# Patient Record
Sex: FEMALE | Race: WHITE | Employment: PART TIME | URBAN - METROPOLITAN AREA
[De-identification: names, ages, dates, MRNs, and addresses within clinical notes are randomized per-mention and may not be internally consistent; named-entity substitution may affect disease eponyms.]

---

## 2017-04-20 ENCOUNTER — APPOINTMENT (OUTPATIENT)
Dept: GENERAL RADIOLOGY | Age: 57
End: 2017-04-20
Payer: MEDICARE

## 2017-04-20 ENCOUNTER — HOSPITAL ENCOUNTER (EMERGENCY)
Age: 57
Discharge: HOME | End: 2017-04-20
Attending: EMERGENCY MEDICINE
Payer: MEDICARE

## 2017-04-20 VITALS
TEMPERATURE: 98.2 F | SYSTOLIC BLOOD PRESSURE: 168 MMHG | OXYGEN SATURATION: 100 % | RESPIRATION RATE: 18 BRPM | DIASTOLIC BLOOD PRESSURE: 72 MMHG | HEART RATE: 71 BPM

## 2017-04-20 DIAGNOSIS — J06.9 VIRAL UPPER RESPIRATORY TRACT INFECTION: Primary | ICD-10-CM

## 2017-04-20 LAB
DIRECT EXAM: NORMAL
Lab: NORMAL
SPECIMEN DESCRIPTION: NORMAL
STATUS: NORMAL

## 2017-04-20 PROCEDURE — 99284 EMERGENCY DEPT VISIT MOD MDM: CPT

## 2017-04-20 PROCEDURE — 93005 ELECTROCARDIOGRAM TRACING: CPT

## 2017-04-20 PROCEDURE — 87651 STREP A DNA AMP PROBE: CPT

## 2017-04-20 PROCEDURE — 71020 XR CHEST STANDARD TWO VW: CPT

## 2017-04-20 RX ORDER — DABIGATRAN ETEXILATE 150 MG/1
150 CAPSULE, COATED PELLETS ORAL 2 TIMES DAILY
COMMUNITY

## 2017-04-20 RX ORDER — PREDNISONE 50 MG/1
50 TABLET ORAL DAILY
Qty: 5 TABLET | Refills: 0 | Status: SHIPPED | OUTPATIENT
Start: 2017-04-20 | End: 2017-04-25

## 2017-04-20 ASSESSMENT — PAIN DESCRIPTION - PAIN TYPE: TYPE: ACUTE PAIN

## 2017-04-20 ASSESSMENT — PAIN SCALES - GENERAL: PAINLEVEL_OUTOF10: 4

## 2017-04-21 LAB
DIRECT EXAM: NORMAL
DIRECT EXAM: NORMAL
EKG ATRIAL RATE: 60 BPM
EKG P AXIS: 20 DEGREES
EKG P-R INTERVAL: 168 MS
EKG Q-T INTERVAL: 434 MS
EKG QRS DURATION: 84 MS
EKG QTC CALCULATION (BAZETT): 434 MS
EKG R AXIS: 38 DEGREES
EKG T AXIS: 46 DEGREES
EKG VENTRICULAR RATE: 60 BPM
Lab: NORMAL
Lab: NORMAL
SPECIMEN DESCRIPTION: NORMAL
SPECIMEN DESCRIPTION: NORMAL
STATUS: NORMAL

## 2017-06-11 ENCOUNTER — HOSPITAL ENCOUNTER (EMERGENCY)
Age: 57
Discharge: HOME | End: 2017-06-11
Attending: EMERGENCY MEDICINE
Payer: MEDICARE

## 2017-06-11 ENCOUNTER — APPOINTMENT (OUTPATIENT)
Dept: GENERAL RADIOLOGY | Age: 57
End: 2017-06-11
Payer: MEDICARE

## 2017-06-11 VITALS
OXYGEN SATURATION: 99 % | WEIGHT: 182 LBS | HEART RATE: 55 BPM | BODY MASS INDEX: 27.58 KG/M2 | RESPIRATION RATE: 16 BRPM | TEMPERATURE: 98.8 F | HEIGHT: 68 IN | SYSTOLIC BLOOD PRESSURE: 131 MMHG | DIASTOLIC BLOOD PRESSURE: 78 MMHG

## 2017-06-11 DIAGNOSIS — I48.0 PAROXYSMAL ATRIAL FIBRILLATION (HCC): Primary | ICD-10-CM

## 2017-06-11 LAB
ABSOLUTE EOS #: 0.1 K/UL (ref 0–0.4)
ABSOLUTE LYMPH #: 3.2 K/UL (ref 1–4.8)
ABSOLUTE MONO #: 0.9 K/UL (ref 0.1–1.2)
ANION GAP SERPL CALCULATED.3IONS-SCNC: 15 MMOL/L (ref 9–17)
BASOPHILS # BLD: 1 %
BASOPHILS ABSOLUTE: 0.1 K/UL (ref 0–0.2)
BNP INTERPRETATION: NORMAL
BUN BLDV-MCNC: 20 MG/DL (ref 6–20)
BUN/CREAT BLD: ABNORMAL (ref 9–20)
CALCIUM SERPL-MCNC: 9.1 MG/DL (ref 8.6–10.4)
CHLORIDE BLD-SCNC: 105 MMOL/L (ref 98–107)
CO2: 25 MMOL/L (ref 20–31)
CREAT SERPL-MCNC: 0.68 MG/DL (ref 0.5–0.9)
DIFFERENTIAL TYPE: NORMAL
EOSINOPHILS RELATIVE PERCENT: 1 %
GFR AFRICAN AMERICAN: >60 ML/MIN
GFR NON-AFRICAN AMERICAN: >60 ML/MIN
GFR SERPL CREATININE-BSD FRML MDRD: ABNORMAL ML/MIN/{1.73_M2}
GFR SERPL CREATININE-BSD FRML MDRD: ABNORMAL ML/MIN/{1.73_M2}
GLUCOSE BLD-MCNC: 94 MG/DL (ref 70–99)
HCT VFR BLD CALC: 45.6 % (ref 36–46)
HEMOGLOBIN: 15.1 G/DL (ref 12–16)
INR BLD: 1.1
LYMPHOCYTES # BLD: 30 %
MCH RBC QN AUTO: 29.6 PG (ref 26–34)
MCHC RBC AUTO-ENTMCNC: 33.1 G/DL (ref 31–37)
MCV RBC AUTO: 89.5 FL (ref 80–100)
MONOCYTES # BLD: 8 %
PARTIAL THROMBOPLASTIN TIME: 46.7 SEC (ref 21.3–31.3)
PDW BLD-RTO: 13.7 % (ref 12.5–15.4)
PLATELET # BLD: 276 K/UL (ref 140–450)
PLATELET ESTIMATE: NORMAL
PMV BLD AUTO: 8.8 FL (ref 6–12)
POTASSIUM SERPL-SCNC: 3.2 MMOL/L (ref 3.7–5.3)
PRO-BNP: 136 PG/ML
PROTHROMBIN TIME: 11.3 SEC (ref 9.4–12.6)
RBC # BLD: 5.1 M/UL (ref 4–5.2)
RBC # BLD: NORMAL 10*6/UL
SEG NEUTROPHILS: 60 %
SEGMENTED NEUTROPHILS ABSOLUTE COUNT: 6.3 K/UL (ref 1.8–7.7)
SODIUM BLD-SCNC: 145 MMOL/L (ref 135–144)
TROPONIN INTERP: NORMAL
TROPONIN T: <0.03 NG/ML
WBC # BLD: 10.6 K/UL (ref 3.5–11)
WBC # BLD: NORMAL 10*3/UL

## 2017-06-11 PROCEDURE — 99285 EMERGENCY DEPT VISIT HI MDM: CPT

## 2017-06-11 PROCEDURE — 85025 COMPLETE CBC W/AUTO DIFF WBC: CPT

## 2017-06-11 PROCEDURE — 84484 ASSAY OF TROPONIN QUANT: CPT

## 2017-06-11 PROCEDURE — 96374 THER/PROPH/DIAG INJ IV PUSH: CPT

## 2017-06-11 PROCEDURE — 85730 THROMBOPLASTIN TIME PARTIAL: CPT

## 2017-06-11 PROCEDURE — 83880 ASSAY OF NATRIURETIC PEPTIDE: CPT

## 2017-06-11 PROCEDURE — 80048 BASIC METABOLIC PNL TOTAL CA: CPT

## 2017-06-11 PROCEDURE — 2500000003 HC RX 250 WO HCPCS: Performed by: EMERGENCY MEDICINE

## 2017-06-11 PROCEDURE — 93005 ELECTROCARDIOGRAM TRACING: CPT

## 2017-06-11 PROCEDURE — 36415 COLL VENOUS BLD VENIPUNCTURE: CPT

## 2017-06-11 PROCEDURE — 2580000003 HC RX 258: Performed by: EMERGENCY MEDICINE

## 2017-06-11 PROCEDURE — 71010 XR CHEST PORTABLE: CPT

## 2017-06-11 PROCEDURE — 85610 PROTHROMBIN TIME: CPT

## 2017-06-11 RX ORDER — 0.9 % SODIUM CHLORIDE 0.9 %
500 INTRAVENOUS SOLUTION INTRAVENOUS ONCE
Status: COMPLETED | OUTPATIENT
Start: 2017-06-11 | End: 2017-06-11

## 2017-06-11 RX ORDER — METOPROLOL TARTRATE 5 MG/5ML
5 INJECTION INTRAVENOUS ONCE
Status: COMPLETED | OUTPATIENT
Start: 2017-06-11 | End: 2017-06-11

## 2017-06-11 RX ORDER — LEVOTHYROXINE SODIUM 0.07 MG/1
75 TABLET ORAL DAILY
COMMUNITY

## 2017-06-11 RX ADMIN — METOPROLOL TARTRATE 5 MG: 5 INJECTION INTRAVENOUS at 17:16

## 2017-06-11 RX ADMIN — SODIUM CHLORIDE 500 ML: 9 INJECTION, SOLUTION INTRAVENOUS at 17:16

## 2017-06-12 LAB
EKG ATRIAL RATE: 375 BPM
EKG ATRIAL RATE: 55 BPM
EKG P AXIS: 61 DEGREES
EKG P-R INTERVAL: 188 MS
EKG Q-T INTERVAL: 366 MS
EKG Q-T INTERVAL: 436 MS
EKG QRS DURATION: 92 MS
EKG QRS DURATION: 96 MS
EKG QTC CALCULATION (BAZETT): 417 MS
EKG QTC CALCULATION (BAZETT): 445 MS
EKG R AXIS: 29 DEGREES
EKG R AXIS: 40 DEGREES
EKG T AXIS: -36 DEGREES
EKG T AXIS: 42 DEGREES
EKG VENTRICULAR RATE: 55 BPM
EKG VENTRICULAR RATE: 89 BPM

## 2017-11-30 ENCOUNTER — HOSPITAL ENCOUNTER (EMERGENCY)
Age: 57
Discharge: HOME | End: 2017-11-30
Attending: EMERGENCY MEDICINE
Payer: MEDICARE

## 2017-11-30 VITALS
SYSTOLIC BLOOD PRESSURE: 148 MMHG | TEMPERATURE: 97.9 F | WEIGHT: 184 LBS | OXYGEN SATURATION: 98 % | BODY MASS INDEX: 27.89 KG/M2 | HEIGHT: 68 IN | DIASTOLIC BLOOD PRESSURE: 87 MMHG | RESPIRATION RATE: 12 BRPM | HEART RATE: 76 BPM

## 2017-11-30 DIAGNOSIS — M62.838 SPASM OF MUSCLE: ICD-10-CM

## 2017-11-30 DIAGNOSIS — T14.8XXA MUSCLE STRAIN: Primary | ICD-10-CM

## 2017-11-30 PROCEDURE — 99282 EMERGENCY DEPT VISIT SF MDM: CPT

## 2017-11-30 RX ORDER — CYCLOBENZAPRINE HCL 10 MG
10 TABLET ORAL 3 TIMES DAILY PRN
Qty: 30 TABLET | Refills: 0 | Status: SHIPPED | OUTPATIENT
Start: 2017-11-30 | End: 2017-12-10

## 2017-11-30 ASSESSMENT — ENCOUNTER SYMPTOMS
COUGH: 0
VOMITING: 0
EYE REDNESS: 0
EYE DISCHARGE: 0
ABDOMINAL PAIN: 0
BACK PAIN: 1
NAUSEA: 0
SHORTNESS OF BREATH: 0
SORE THROAT: 0

## 2017-11-30 ASSESSMENT — PAIN SCALES - GENERAL: PAINLEVEL_OUTOF10: 10

## 2017-11-30 ASSESSMENT — PAIN DESCRIPTION - ORIENTATION: ORIENTATION: LOWER;MID

## 2017-11-30 ASSESSMENT — PAIN DESCRIPTION - LOCATION: LOCATION: BACK

## 2017-11-30 ASSESSMENT — PAIN DESCRIPTION - PAIN TYPE: TYPE: ACUTE PAIN

## 2017-11-30 NOTE — ED PROVIDER NOTES
[facility] ED  [address] [address]  Phone: [phone]  Fax: [phone]      Pt Name: Jefe Farr  MRN: [id number]  [place] 1960  Date of evaluation: 11/30/2017      CHIEF COMPLAINT       Chief Complaint   Patient presents with    Back Pain       HISTORY OF PRESENT ILLNESS   (Location, Quality, Severity, Duration, Timing, Context, Modifying Factors, Associated Signs and Symptoms)     Jefe Farr is a 61 y.o. female who presents to the ER for evaluation of mid and lower back pain. Patient states that 8 days ago it was a very with the day and the car door blew hitting her. She states that in the process she twisted her back. Since that time she has had continuous pain in the mid to lower back. She states that the pain is worsening. It feels as if the muscles are spasming. She has strained her back on one other occasion. She has had no changes in bowel or bladder habits. She has had no numbness or weakness in her extremities. There is no radiation of pain to the lower extremities or abdomen. Patient states that she took Tylenol for her discomfort last night with no relief. She rates her current discomfort at 8-10/10. Pain is worse with movement. Nursing Notes were reviewed. REVIEW OF SYSTEMS     (2-9 systems for level 4, 10 or more for level 5)    Review of Systems   Constitutional: Negative for chills and fever. HENT: Negative for congestion, ear pain and sore throat. Eyes: Negative for discharge and redness. Respiratory: Negative for cough and shortness of breath. Cardiovascular: Negative for chest pain. Gastrointestinal: Negative for abdominal pain, nausea and vomiting. Genitourinary: Negative for dysuria and frequency. Musculoskeletal: Positive for back pain. Negative for neck pain. Skin: Negative for rash. Neurological: Negative for seizures, syncope and headaches. Psychiatric/Behavioral: Negative for suicidal ideas. All other systems reviewed and are negative. PAST MEDICAL HISTORY    has a past medical history of Atrial fibrillation ([address] Headache; Hypertension; and Kidney stone. SURGICAL HISTORY      has a past surgical history that includes Tubal ligation (Bilateral). CURRENT MEDICATIONS       Previous Medications    ALBUTEROL SULFATE HFA (PROVENTIL HFA) 108 (90 BASE) MCG/ACT INHALER    Inhale 2 puffs into the lungs every 4 hours as needed for Wheezing or Shortness of Breath (Space out to every 6 hours as symptoms improve) Space out to every 6 hours as symptoms improve. DABIGATRAN (PRADAXA) 150 MG CAPSULE    Take 150 mg by mouth 2 times daily    HYDROCHLOROTHIAZIDE (HYDRODIURIL) 25 MG TABLET    Take 25 mg by mouth daily    LEVOTHYROXINE (SYNTHROID) 25 MCG TABLET    Take 25 mcg by mouth Daily    METOPROLOL SUCCINATE (TOPROL XL) 25 MG EXTENDED RELEASE TABLET    Take 100 mg by mouth daily     TOPIRAMATE (TOPAMAX PO)    Take 100 mg by mouth 2 times daily      ALLERGIES     is allergic to penicillins. FAMILY HISTORY     Not relevant to the current problem. SOCIAL HISTORY      reports that she has never smoked. She has never used smokeless tobacco. She reports that she drinks alcohol. She reports that she does not use drugs. PHYSICAL EXAM     (7 for level 4, 8 or more for level 5)    ED Triage Vitals [11/30/17 1338]   BP Temp Temp Source Pulse Resp SpO2 Height Weight   (!) 148/87 97.9 °F (36.6 °C) Oral 76 12 98 % 5' 8\" (1.727 m) 184 lb (83.5 kg)     Physical Exam   Constitutional: She appears well-developed and well-nourished. No distress. Nontoxic. HENT:   Head: Normocephalic and atraumatic. Eyes: No scleral icterus. Neck: Normal range of motion. Neck supple. No cervical midline tenderness. Cardiovascular: Normal rate, regular rhythm and normal heart sounds. Pulmonary/Chest: Effort normal and breath sounds normal. No respiratory distress. Abdominal: Soft.  Bowel sounds are normal. There is no tenderness. There is no rebound and no guarding. Musculoskeletal:        Thoracic back: She exhibits decreased range of motion, tenderness and spasm. She exhibits no deformity and normal pulse. Lumbar back: She exhibits decreased range of motion, tenderness and spasm. She exhibits no deformity and normal pulse. Back:    Bilateral thoracic and lumbar paravertebral tenderness with spasm. Normal ambulation. Normal strength and sensation in the bilateral lower extremities. Neurological: She is alert. Grossly intact. Skin: Skin is warm and dry. No rash noted. Psychiatric: She has a normal mood and affect. Her behavior is normal.   Vitals reviewed. DIAGNOSTIC RESULTS     LABS:  No results found for this visit on 11/30/17. MDM:   Patient presents to the ER for evaluation of thoracic and lumbar back pain. Patient states that she was hit by a car door 8 days ago and twisted her back. Patient states that her pain is progressively worsened. She denies numbness or weakness in her extremities. She has had no change in bowel or bladder habits. There is no radiation of pain to the abdomen or lower extremities. On exam, the patient has decreased range of motion most of her tenderness and spasm noted over the lower thoracic and lumbar back region. Patient has no midline tenderness. I feel that she has strained the muscles in her back. She will be treated with Flexeril. I will advise her to apply moist heat to the affected area. She may continue to take Tylenol as directed for pain. Controlled Substances Monitoring:   Attestation: The Prescription Monitoring Report for this patient was reviewed today. Ros Magana, Naresh Dennis)  Documentation: No signs of potential drug abuse or diversion identified.  Ros Magana PA-C)    EMERGENCY DEPARTMENT COURSE:   Vitals:    Vitals:    11/30/17 1338   BP: (!) 148/87   Pulse: 76   Resp: 12   Temp: 97.9 °F (36.6 °C)   TempSrc: Oral   SpO2: 98% Weight: 83.5 kg (184 lb)   Height: 5' 8\" (1.727 m)     -------------------------  BP: (!) 148/87, Temp: 97.9 °F (36.6 °C), Pulse: 76, Resp: 12    The patient was given the following medications:  Orders Placed This Encounter   Medications    cyclobenzaprine (FLEXERIL) 10 MG tablet     Sig: Take 1 tablet by mouth 3 times daily as needed for Muscle spasms     Dispense:  30 tablet     Refill:  0      FINAL IMPRESSION      1. Muscle strain    2.  Spasm of muscle        DISPOSITION/PLAN   DISPOSITION Decision to Discharge - home    Condition on Disposition  Stable    PATIENT REFERRED TO:  Nichelle Grissom DO  [address] [facility] [address] [phone]    Schedule an appointment as soon as possible for a visit in 3 days  For reevaluation    DISCHARGE MEDICATIONS:  New Prescriptions    CYCLOBENZAPRINE (FLEXERIL) 10 MG TABLET    Take 1 tablet by mouth 3 times daily as needed for Muscle spasms     (Please note that portions of this note were completed with a voice recognition program.  Efforts were made to edit the dictations but occasionally words are mis-transcribed.)    Malia Leo PA-C  11/30/17 [id number]

## 2018-01-14 ENCOUNTER — APPOINTMENT (OUTPATIENT)
Dept: GENERAL RADIOLOGY | Age: 58
End: 2018-01-14
Payer: MEDICARE

## 2018-01-14 ENCOUNTER — HOSPITAL ENCOUNTER (EMERGENCY)
Age: 58
Discharge: HOME | End: 2018-01-14
Attending: EMERGENCY MEDICINE
Payer: MEDICARE

## 2018-01-14 VITALS
HEIGHT: 68 IN | DIASTOLIC BLOOD PRESSURE: 77 MMHG | BODY MASS INDEX: 27.58 KG/M2 | HEART RATE: 55 BPM | RESPIRATION RATE: 16 BRPM | SYSTOLIC BLOOD PRESSURE: 121 MMHG | OXYGEN SATURATION: 98 % | TEMPERATURE: 98.1 F | WEIGHT: 182 LBS

## 2018-01-14 DIAGNOSIS — I48.0 PAROXYSMAL ATRIAL FIBRILLATION (HCC): Primary | ICD-10-CM

## 2018-01-14 LAB
ABSOLUTE EOS #: 0.1 K/UL (ref 0–0.4)
ABSOLUTE IMMATURE GRANULOCYTE: NORMAL K/UL (ref 0–0.3)
ABSOLUTE LYMPH #: 3.8 K/UL (ref 1–4.8)
ABSOLUTE MONO #: 0.8 K/UL (ref 0.1–1.2)
ANION GAP SERPL CALCULATED.3IONS-SCNC: 15 MMOL/L (ref 9–17)
BASOPHILS # BLD: 1 % (ref 0–2)
BASOPHILS ABSOLUTE: 0.1 K/UL (ref 0–0.2)
BNP INTERPRETATION: NORMAL
BUN BLDV-MCNC: 17 MG/DL (ref 6–20)
BUN/CREAT BLD: ABNORMAL (ref 9–20)
CALCIUM SERPL-MCNC: 9.6 MG/DL (ref 8.6–10.4)
CHLORIDE BLD-SCNC: 105 MMOL/L (ref 98–107)
CO2: 24 MMOL/L (ref 20–31)
CREAT SERPL-MCNC: 0.7 MG/DL (ref 0.5–0.9)
DIFFERENTIAL TYPE: NORMAL
EOSINOPHILS RELATIVE PERCENT: 1 % (ref 1–4)
GFR AFRICAN AMERICAN: >60 ML/MIN
GFR NON-AFRICAN AMERICAN: >60 ML/MIN
GFR SERPL CREATININE-BSD FRML MDRD: ABNORMAL ML/MIN/{1.73_M2}
GFR SERPL CREATININE-BSD FRML MDRD: ABNORMAL ML/MIN/{1.73_M2}
GLUCOSE BLD-MCNC: 93 MG/DL (ref 70–99)
HCT VFR BLD CALC: 44.9 % (ref 36–46)
HEMOGLOBIN: 15.2 G/DL (ref 12–16)
IMMATURE GRANULOCYTES: NORMAL %
LYMPHOCYTES # BLD: 35 % (ref 24–44)
MCH RBC QN AUTO: 30.5 PG (ref 26–34)
MCHC RBC AUTO-ENTMCNC: 33.9 G/DL (ref 31–37)
MCV RBC AUTO: 90 FL (ref 80–100)
MONOCYTES # BLD: 7 % (ref 2–11)
MYOGLOBIN: 58 NG/ML (ref 25–58)
PDW BLD-RTO: 14.2 % (ref 12.5–15.4)
PLATELET # BLD: 319 K/UL (ref 140–450)
PLATELET ESTIMATE: NORMAL
PMV BLD AUTO: 9.2 FL (ref 6–12)
POTASSIUM SERPL-SCNC: 3.4 MMOL/L (ref 3.7–5.3)
PRO-BNP: 159 PG/ML
RBC # BLD: 4.99 M/UL (ref 4–5.2)
RBC # BLD: NORMAL 10*6/UL
SEG NEUTROPHILS: 56 % (ref 36–66)
SEGMENTED NEUTROPHILS ABSOLUTE COUNT: 6.1 K/UL (ref 1.8–7.7)
SODIUM BLD-SCNC: 144 MMOL/L (ref 135–144)
TROPONIN INTERP: NORMAL
TROPONIN T: <0.03 NG/ML
WBC # BLD: 10.9 K/UL (ref 3.5–11)
WBC # BLD: NORMAL 10*3/UL

## 2018-01-14 PROCEDURE — 83874 ASSAY OF MYOGLOBIN: CPT

## 2018-01-14 PROCEDURE — 71046 X-RAY EXAM CHEST 2 VIEWS: CPT

## 2018-01-14 PROCEDURE — 36415 COLL VENOUS BLD VENIPUNCTURE: CPT

## 2018-01-14 PROCEDURE — 85025 COMPLETE CBC W/AUTO DIFF WBC: CPT

## 2018-01-14 PROCEDURE — 96374 THER/PROPH/DIAG INJ IV PUSH: CPT

## 2018-01-14 PROCEDURE — 99285 EMERGENCY DEPT VISIT HI MDM: CPT

## 2018-01-14 PROCEDURE — 2500000003 HC RX 250 WO HCPCS: Performed by: PHYSICIAN ASSISTANT

## 2018-01-14 PROCEDURE — 84484 ASSAY OF TROPONIN QUANT: CPT

## 2018-01-14 PROCEDURE — 80048 BASIC METABOLIC PNL TOTAL CA: CPT

## 2018-01-14 PROCEDURE — 83880 ASSAY OF NATRIURETIC PEPTIDE: CPT

## 2018-01-14 PROCEDURE — 93005 ELECTROCARDIOGRAM TRACING: CPT

## 2018-01-14 RX ORDER — METOPROLOL TARTRATE 5 MG/5ML
5 INJECTION INTRAVENOUS ONCE
Status: COMPLETED | OUTPATIENT
Start: 2018-01-14 | End: 2018-01-14

## 2018-01-14 RX ADMIN — METOPROLOL TARTRATE 5 MG: 5 INJECTION INTRAVENOUS at 18:00

## 2018-01-14 NOTE — ED PROVIDER NOTES
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 C/O lightheadedness. Denies nausea, sob, or pain. Pt assisted into gown and placed on full cardio-resp monitor. HISTORY OF PRESENT ILLNESS  (Location/Symptom, Timing/Onset, Context/Setting, Quality, Duration, Modifying Factors, Severity.)   Sergio Huntley is a 61 y.o. female who presents to the emergency department for evaluation of palpitations/lightheadedness and suspected A. Fib exacerbation or she is at work. 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 Patient states that this feels exactly like previous episodes of her paroxysmal A. Fib flareups. She denies any chest pains/respiratory symptoms/bowel symptoms/headache/altered mental status. She states she has some very mild intermittent lightheadedness only. She presently takes Predaxa and Metoprolol through Dr. Leydi Torrez her Cardiologist.  She reports intermittent mild edema of left ankle x1 month. She has no other complaints. Nursing Notes were reviewed. REVIEW OF SYSTEMS    (2-9 systems for level 4, 10 or more for level 5)     Review of Systems   Constitutional: Negative. HENT: Negative. Eyes: Negative. Respiratory: Negative. Cardiovascular: Negative. Gastrointestinal: Negative. Musculoskeletal: Negative. Endocrine: Negative. Genitourinary: Negative. Skin: Negative. Allergic/Immunologic: Negative. Neurological: Negative. Hematological: Negative. Psychiatric/Behavioral: Negative.      Except as noted above EOM are normal. Pupils are equal/round  Cardiovascular:  Irregularly irregular, normal heart sounds, S1 and S2.  Pulmonary/Chest: Effort normal and breath sounds normal. No wheezes/rales/rhonchi. Abdominal: Soft. Bowel sounds are normal.   No TTP. Musculoskeletal: Normal to inspection. subtle <1+ edema of lower leg/ankle, no erythema. No bilat calf TTP/edema. NV intact x 4. Neurological: Alert, age appropriate mentation and interaction. Skin: Skin is warm and dry. No rash noted. Psychiatric: Mood, memory, affect and judgment normal.       DIAGNOSTIC RESULTS     EKG: All EKG's are interpreted by the Emergency Department Physician who either signs or Co-signs this chart in the absence of a cardiologist.    Per attending note    RADIOLOGY:   Non-plain film images such as CT, Ultrasound and MRI are read by the radiologist. Plain radiographic images are visualized and preliminarily interpreted by the emergency physician with the below findings:      Interpretation per the Radiologist below, if available at the time of this note:    XR CHEST STANDARD (2 VW)   Final Result   No acute process. LABS:  Labs Reviewed   BASIC METABOLIC PANEL - Abnormal; Notable for the following:        Result Value    Potassium 3.4 (*)     All other components within normal limits   CBC WITH AUTO DIFFERENTIAL   TROP/MYOGLOBIN   BRAIN NATRIURETIC PEPTIDE   TROP/MYOGLOBIN         All other labs were within normal range or not returned as of this dictation. EMERGENCY DEPARTMENT COURSE and DIFFERENTIAL DIAGNOSIS/MDM:   Vitals:    Vitals:    01/14/18 1812 01/14/18 1820 01/14/18 1829 01/14/18 1830   BP: 121/66   121/77   Pulse: 88 57 55 55   Resp:       Temp:       TempSrc:       SpO2: 99% 96% 97% 98%   Weight:       Height:           5:25 PM  Vital signs stable for exception of some vacillating tachycardia. Patient is alert and oriented and in no pain or respiratory distress.   I'm completing a cardiac workup at this

## 2018-01-14 NOTE — ED PROVIDER NOTES
Department Physician who either signs or Co-signs this chart in the absence of a cardiologist.    EKG as interpreted by myself as showing atrial fibrillation with PVCs. There are nonspecific ST-T wave abnormalities. .  There is a rapid ventricular response with a rate of 112. QRS duration is 88. QT corrected interval is 496. RADIOLOGY:   Non-plain film images such as CT, Ultrasound and MRI are read by the radiologist. [facility] radiographic images are visualized and the radiologist interpretations are reviewed as follows:     Xr Chest Standard (2 Vw)    Result Date: 1/14/2018  EXAMINATION: TWO VIEWS OF THE CHEST 1/14/2018 5:04 pm COMPARISON: June 11, 2017. HISTORY: ORDERING SYSTEM PROVIDED HISTORY: afib/palps TECHNOLOGIST PROVIDED HISTORY: Reason for exam:->afib/palps Ordering Physician Provided Reason for Exam: heart palpitations Acuity: Acute Type of Exam: Initial Relevant Medical/Surgical History: hx Afib FINDINGS: The lungs are without acute focal process. There is no effusion or pneumothorax. The cardiomediastinal silhouette is stable. The osseous structures are stable. No acute process.        LABS:  Results for orders placed or performed during the hospital encounter of 01/14/18   CBC Auto Differential   Result Value Ref Range    WBC 10.9 3.5 - 11.0 k/uL    RBC 4.99 4.0 - 5.2 m/uL    Hemoglobin 15.2 12.0 - 16.0 g/dL    Hematocrit 44.9 36 - 46 %    MCV 90.0 80 - 100 fL    MCH 30.5 26 - 34 pg    MCHC 33.9 31 - 37 g/dL    RDW 14.2 12.5 - 15.4 %    Platelets 806 599 - 726 k/uL    MPV 9.2 6.0 - 12.0 fL    Differential Type NOT REPORTED     Seg Neutrophils 56 36 - 66 %    Lymphocytes 35 24 - 44 %    Monocytes 7 2 - 11 %    Eosinophils % 1 1 - 4 %    Basophils 1 0 - 2 %    Immature Granulocytes NOT REPORTED 0 %    Segs Absolute 6.10 1.8 - 7.7 k/uL    Absolute Lymph # 3.80 1.0 - 4.8 k/uL    Absolute Mono # 0.80 0.1 - 1.2 k/uL    Absolute Eos # 0.10 0.0 - 0.4 k/uL    Basophils # 0.10 0.0 - 0.2 k/uL    Absolute

## 2018-01-15 LAB
EKG ATRIAL RATE: 136 BPM
EKG ATRIAL RATE: 56 BPM
EKG P AXIS: 58 DEGREES
EKG P-R INTERVAL: 184 MS
EKG Q-T INTERVAL: 364 MS
EKG Q-T INTERVAL: 438 MS
EKG QRS DURATION: 84 MS
EKG QRS DURATION: 88 MS
EKG QTC CALCULATION (BAZETT): 422 MS
EKG QTC CALCULATION (BAZETT): 496 MS
EKG R AXIS: 39 DEGREES
EKG R AXIS: 42 DEGREES
EKG T AXIS: -28 DEGREES
EKG T AXIS: 38 DEGREES
EKG VENTRICULAR RATE: 112 BPM
EKG VENTRICULAR RATE: 56 BPM

## 2018-01-15 PROCEDURE — 93005 ELECTROCARDIOGRAM TRACING: CPT

## 2018-05-25 ENCOUNTER — HOSPITAL ENCOUNTER (EMERGENCY)
Age: 58
Discharge: HOME | End: 2018-05-25
Attending: EMERGENCY MEDICINE
Payer: MEDICARE

## 2018-05-25 VITALS
TEMPERATURE: 98.1 F | SYSTOLIC BLOOD PRESSURE: 155 MMHG | WEIGHT: 185 LBS | BODY MASS INDEX: 28.04 KG/M2 | RESPIRATION RATE: 18 BRPM | HEART RATE: 68 BPM | DIASTOLIC BLOOD PRESSURE: 86 MMHG | HEIGHT: 68 IN | OXYGEN SATURATION: 97 %

## 2018-05-25 DIAGNOSIS — J02.0 ACUTE STREPTOCOCCAL PHARYNGITIS: Primary | ICD-10-CM

## 2018-05-25 PROCEDURE — 6370000000 HC RX 637 (ALT 250 FOR IP): Performed by: EMERGENCY MEDICINE

## 2018-05-25 PROCEDURE — 99282 EMERGENCY DEPT VISIT SF MDM: CPT

## 2018-05-25 RX ORDER — AZITHROMYCIN 250 MG/1
500 TABLET, FILM COATED ORAL ONCE
Status: COMPLETED | OUTPATIENT
Start: 2018-05-25 | End: 2018-05-25

## 2018-05-25 RX ORDER — AZITHROMYCIN 250 MG/1
250 TABLET, FILM COATED ORAL DAILY
Qty: 9 TABLET | Refills: 0 | Status: SHIPPED | OUTPATIENT
Start: 2018-05-25 | End: 2018-11-20

## 2018-05-25 RX ADMIN — AZITHROMYCIN 500 MG: 250 TABLET, FILM COATED ORAL at 10:03

## 2018-05-25 ASSESSMENT — PAIN DESCRIPTION - PAIN TYPE: TYPE: ACUTE PAIN

## 2018-05-25 ASSESSMENT — PAIN DESCRIPTION - ORIENTATION: ORIENTATION: LEFT

## 2018-05-25 ASSESSMENT — PAIN SCALES - GENERAL: PAINLEVEL_OUTOF10: 5

## 2018-05-25 ASSESSMENT — PAIN DESCRIPTION - LOCATION: LOCATION: EAR;THROAT

## 2018-05-29 ASSESSMENT — ENCOUNTER SYMPTOMS
STRIDOR: 0
EYE PAIN: 0
EYE REDNESS: 0
NAUSEA: 0
TROUBLE SWALLOWING: 0
ABDOMINAL PAIN: 0
COUGH: 0
EYE DISCHARGE: 0
SHORTNESS OF BREATH: 0
WHEEZING: 0
COLOR CHANGE: 0
VOMITING: 0
SORE THROAT: 1
DIARRHEA: 0
CONSTIPATION: 0

## 2018-06-02 ENCOUNTER — APPOINTMENT (OUTPATIENT)
Dept: GENERAL RADIOLOGY | Age: 58
End: 2018-06-02
Payer: MEDICARE

## 2018-06-02 ENCOUNTER — HOSPITAL ENCOUNTER (EMERGENCY)
Age: 58
Discharge: HOME | End: 2018-06-02
Attending: EMERGENCY MEDICINE
Payer: MEDICARE

## 2018-06-02 VITALS
DIASTOLIC BLOOD PRESSURE: 102 MMHG | OXYGEN SATURATION: 98 % | SYSTOLIC BLOOD PRESSURE: 155 MMHG | BODY MASS INDEX: 27.43 KG/M2 | RESPIRATION RATE: 10 BRPM | HEART RATE: 61 BPM | HEIGHT: 68 IN | WEIGHT: 181 LBS | TEMPERATURE: 97.9 F

## 2018-06-02 DIAGNOSIS — I48.0 PAROXYSMAL ATRIAL FIBRILLATION (HCC): Primary | ICD-10-CM

## 2018-06-02 LAB
ABSOLUTE EOS #: 0.1 K/UL (ref 0–0.4)
ABSOLUTE IMMATURE GRANULOCYTE: ABNORMAL K/UL (ref 0–0.3)
ABSOLUTE LYMPH #: 4.5 K/UL (ref 1–4.8)
ABSOLUTE MONO #: 0.7 K/UL (ref 0.1–1.2)
ANION GAP SERPL CALCULATED.3IONS-SCNC: 16 MMOL/L (ref 9–17)
BASOPHILS # BLD: 2 % (ref 0–2)
BASOPHILS ABSOLUTE: 0.2 K/UL (ref 0–0.2)
BUN BLDV-MCNC: 17 MG/DL (ref 6–20)
BUN/CREAT BLD: ABNORMAL (ref 9–20)
CALCIUM SERPL-MCNC: 9.2 MG/DL (ref 8.6–10.4)
CHLORIDE BLD-SCNC: 105 MMOL/L (ref 98–107)
CO2: 26 MMOL/L (ref 20–31)
CREAT SERPL-MCNC: 0.69 MG/DL (ref 0.5–0.9)
DIFFERENTIAL TYPE: ABNORMAL
EKG ATRIAL RATE: 61 BPM
EKG P AXIS: 41 DEGREES
EKG P-R INTERVAL: 184 MS
EKG Q-T INTERVAL: 432 MS
EKG QRS DURATION: 94 MS
EKG QTC CALCULATION (BAZETT): 434 MS
EKG R AXIS: 34 DEGREES
EKG T AXIS: 40 DEGREES
EKG VENTRICULAR RATE: 61 BPM
EOSINOPHILS RELATIVE PERCENT: 1 % (ref 1–4)
GFR AFRICAN AMERICAN: >60 ML/MIN
GFR NON-AFRICAN AMERICAN: >60 ML/MIN
GFR SERPL CREATININE-BSD FRML MDRD: ABNORMAL ML/MIN/{1.73_M2}
GFR SERPL CREATININE-BSD FRML MDRD: ABNORMAL ML/MIN/{1.73_M2}
GLUCOSE BLD-MCNC: 95 MG/DL (ref 70–99)
HCT VFR BLD CALC: 46.6 % (ref 36–46)
HEMOGLOBIN: 15.3 G/DL (ref 12–16)
IMMATURE GRANULOCYTES: ABNORMAL %
LYMPHOCYTES # BLD: 41 % (ref 24–44)
MCH RBC QN AUTO: 30.3 PG (ref 26–34)
MCHC RBC AUTO-ENTMCNC: 32.8 G/DL (ref 31–37)
MCV RBC AUTO: 92.4 FL (ref 80–100)
MONOCYTES # BLD: 7 % (ref 2–11)
NRBC AUTOMATED: ABNORMAL PER 100 WBC
PDW BLD-RTO: 14.1 % (ref 12.5–15.4)
PLATELET # BLD: 295 K/UL (ref 140–450)
PLATELET ESTIMATE: ABNORMAL
PMV BLD AUTO: 9.5 FL (ref 6–12)
POTASSIUM SERPL-SCNC: 3.1 MMOL/L (ref 3.7–5.3)
RBC # BLD: 5.05 M/UL (ref 4–5.2)
RBC # BLD: ABNORMAL 10*6/UL
SEG NEUTROPHILS: 49 % (ref 36–66)
SEGMENTED NEUTROPHILS ABSOLUTE COUNT: 5.5 K/UL (ref 1.8–7.7)
SODIUM BLD-SCNC: 147 MMOL/L (ref 135–144)
TROPONIN INTERP: NORMAL
TROPONIN T: <0.03 NG/ML
WBC # BLD: 11 K/UL (ref 3.5–11)
WBC # BLD: ABNORMAL 10*3/UL

## 2018-06-02 PROCEDURE — 36415 COLL VENOUS BLD VENIPUNCTURE: CPT

## 2018-06-02 PROCEDURE — 80048 BASIC METABOLIC PNL TOTAL CA: CPT

## 2018-06-02 PROCEDURE — 85025 COMPLETE CBC W/AUTO DIFF WBC: CPT

## 2018-06-02 PROCEDURE — 93005 ELECTROCARDIOGRAM TRACING: CPT

## 2018-06-02 PROCEDURE — 71046 X-RAY EXAM CHEST 2 VIEWS: CPT

## 2018-06-02 PROCEDURE — 99285 EMERGENCY DEPT VISIT HI MDM: CPT

## 2018-06-02 PROCEDURE — 84484 ASSAY OF TROPONIN QUANT: CPT

## 2018-06-02 ASSESSMENT — ENCOUNTER SYMPTOMS
COUGH: 0
BACK PAIN: 0
SORE THROAT: 0
EYE DISCHARGE: 0
EYE REDNESS: 0
NAUSEA: 0
ABDOMINAL PAIN: 0
SHORTNESS OF BREATH: 1
VOMITING: 0

## 2018-06-02 ASSESSMENT — PAIN DESCRIPTION - PAIN TYPE: TYPE: ACUTE PAIN

## 2018-06-02 ASSESSMENT — PAIN DESCRIPTION - LOCATION: LOCATION: CHEST

## 2018-11-20 ENCOUNTER — HOSPITAL ENCOUNTER (EMERGENCY)
Age: 58
Discharge: HOME | End: 2018-11-20
Attending: EMERGENCY MEDICINE
Payer: MEDICARE

## 2018-11-20 ENCOUNTER — APPOINTMENT (OUTPATIENT)
Dept: GENERAL RADIOLOGY | Age: 58
End: 2018-11-20
Payer: MEDICARE

## 2018-11-20 VITALS
SYSTOLIC BLOOD PRESSURE: 131 MMHG | WEIGHT: 183.25 LBS | RESPIRATION RATE: 8 BRPM | DIASTOLIC BLOOD PRESSURE: 72 MMHG | HEIGHT: 68 IN | HEART RATE: 58 BPM | TEMPERATURE: 97.9 F | BODY MASS INDEX: 27.77 KG/M2 | OXYGEN SATURATION: 96 %

## 2018-11-20 DIAGNOSIS — I48.0 PAROXYSMAL ATRIAL FIBRILLATION (HCC): Primary | ICD-10-CM

## 2018-11-20 LAB
-: ABNORMAL
ABSOLUTE EOS #: 0.18 K/UL (ref 0–0.4)
ABSOLUTE IMMATURE GRANULOCYTE: ABNORMAL K/UL (ref 0–0.3)
ABSOLUTE LYMPH #: 4.81 K/UL (ref 1–4.8)
ABSOLUTE MONO #: 0.87 K/UL (ref 0.1–1.2)
ALBUMIN SERPL-MCNC: 4.2 G/DL (ref 3.5–5.2)
ALBUMIN/GLOBULIN RATIO: 1.2 (ref 1–2.5)
ALP BLD-CCNC: 84 U/L (ref 35–104)
ALT SERPL-CCNC: 20 U/L (ref 5–33)
AMORPHOUS: ABNORMAL
ANION GAP SERPL CALCULATED.3IONS-SCNC: 16 MMOL/L (ref 9–17)
AST SERPL-CCNC: 22 U/L
BACTERIA: ABNORMAL
BASOPHILS # BLD: 1 % (ref 0–2)
BASOPHILS ABSOLUTE: 0.06 K/UL (ref 0–0.2)
BILIRUB SERPL-MCNC: 0.23 MG/DL (ref 0.3–1.2)
BILIRUBIN URINE: NEGATIVE
BUN BLDV-MCNC: 14 MG/DL (ref 6–20)
BUN/CREAT BLD: ABNORMAL (ref 9–20)
CALCIUM SERPL-MCNC: 9.7 MG/DL (ref 8.6–10.4)
CASTS UA: ABNORMAL /LPF
CHLORIDE BLD-SCNC: 106 MMOL/L (ref 98–107)
CO2: 23 MMOL/L (ref 20–31)
COLOR: YELLOW
COMMENT UA: ABNORMAL
CREAT SERPL-MCNC: 0.7 MG/DL (ref 0.5–0.9)
CRYSTALS, UA: ABNORMAL /HPF
DIFFERENTIAL TYPE: ABNORMAL
EKG ATRIAL RATE: 59 BPM
EKG P AXIS: 47 DEGREES
EKG P-R INTERVAL: 178 MS
EKG Q-T INTERVAL: 462 MS
EKG QRS DURATION: 88 MS
EKG QTC CALCULATION (BAZETT): 457 MS
EKG R AXIS: 31 DEGREES
EKG T AXIS: 43 DEGREES
EKG VENTRICULAR RATE: 59 BPM
EOSINOPHILS RELATIVE PERCENT: 2 % (ref 1–4)
EPITHELIAL CELLS UA: ABNORMAL /HPF (ref 0–5)
GFR AFRICAN AMERICAN: >60 ML/MIN
GFR NON-AFRICAN AMERICAN: >60 ML/MIN
GFR SERPL CREATININE-BSD FRML MDRD: ABNORMAL ML/MIN/{1.73_M2}
GFR SERPL CREATININE-BSD FRML MDRD: ABNORMAL ML/MIN/{1.73_M2}
GLUCOSE BLD-MCNC: 99 MG/DL (ref 70–99)
GLUCOSE URINE: NEGATIVE
HCT VFR BLD CALC: 48.6 % (ref 36–46)
HEMOGLOBIN: 16.3 G/DL (ref 12–16)
IMMATURE GRANULOCYTES: ABNORMAL %
INR BLD: 1.1
KETONES, URINE: NEGATIVE
LEUKOCYTE ESTERASE, URINE: ABNORMAL
LYMPHOCYTES # BLD: 42 % (ref 24–44)
MAGNESIUM: 1.9 MG/DL (ref 1.6–2.6)
MCH RBC QN AUTO: 30.5 PG (ref 26–34)
MCHC RBC AUTO-ENTMCNC: 33.5 G/DL (ref 31–37)
MCV RBC AUTO: 91 FL (ref 80–100)
MONOCYTES # BLD: 8 % (ref 2–11)
MUCUS: ABNORMAL
NITRITE, URINE: NEGATIVE
NRBC AUTOMATED: ABNORMAL PER 100 WBC
OTHER OBSERVATIONS UA: ABNORMAL
PARTIAL THROMBOPLASTIN TIME: 48.4 SEC (ref 21.3–31.3)
PDW BLD-RTO: 14.1 % (ref 12.5–15.4)
PH UA: 6 (ref 5–8)
PLATELET # BLD: 298 K/UL (ref 140–450)
PLATELET ESTIMATE: ABNORMAL
PMV BLD AUTO: 10.8 FL (ref 8–14)
POTASSIUM SERPL-SCNC: 3.3 MMOL/L (ref 3.7–5.3)
PROTEIN UA: NEGATIVE
PROTHROMBIN TIME: 11.2 SEC (ref 9.4–12.6)
RBC # BLD: 5.34 M/UL (ref 4–5.2)
RBC # BLD: ABNORMAL 10*6/UL
RBC UA: ABNORMAL /HPF (ref 0–2)
RENAL EPITHELIAL, UA: ABNORMAL /HPF
SEG NEUTROPHILS: 47 % (ref 36–66)
SEGMENTED NEUTROPHILS ABSOLUTE COUNT: 5.64 K/UL (ref 1.8–7.7)
SODIUM BLD-SCNC: 145 MMOL/L (ref 135–144)
SPECIFIC GRAVITY UA: 1.02 (ref 1–1.03)
T3 TOTAL: 108 NG/DL (ref 80–200)
THYROXINE, FREE: 1.1 NG/DL (ref 0.93–1.7)
TOTAL PROTEIN: 7.7 G/DL (ref 6.4–8.3)
TRICHOMONAS: ABNORMAL
TROPONIN INTERP: NORMAL
TROPONIN INTERP: NORMAL
TROPONIN T: <0.03 NG/ML
TROPONIN T: <0.03 NG/ML
TSH SERPL DL<=0.05 MIU/L-ACNC: 2.99 MIU/L (ref 0.3–5)
TURBIDITY: CLEAR
URINE HGB: ABNORMAL
UROBILINOGEN, URINE: NORMAL
WBC # BLD: 11.6 K/UL (ref 3.5–11)
WBC # BLD: ABNORMAL 10*3/UL
WBC UA: ABNORMAL /HPF (ref 0–5)
YEAST: ABNORMAL

## 2018-11-20 PROCEDURE — 84439 ASSAY OF FREE THYROXINE: CPT

## 2018-11-20 PROCEDURE — 85610 PROTHROMBIN TIME: CPT

## 2018-11-20 PROCEDURE — 93005 ELECTROCARDIOGRAM TRACING: CPT

## 2018-11-20 PROCEDURE — 99285 EMERGENCY DEPT VISIT HI MDM: CPT

## 2018-11-20 PROCEDURE — 84484 ASSAY OF TROPONIN QUANT: CPT

## 2018-11-20 PROCEDURE — 87086 URINE CULTURE/COLONY COUNT: CPT

## 2018-11-20 PROCEDURE — 36415 COLL VENOUS BLD VENIPUNCTURE: CPT

## 2018-11-20 PROCEDURE — 80053 COMPREHEN METABOLIC PANEL: CPT

## 2018-11-20 PROCEDURE — 71046 X-RAY EXAM CHEST 2 VIEWS: CPT

## 2018-11-20 PROCEDURE — 85730 THROMBOPLASTIN TIME PARTIAL: CPT

## 2018-11-20 PROCEDURE — 84480 ASSAY TRIIODOTHYRONINE (T3): CPT

## 2018-11-20 PROCEDURE — 85025 COMPLETE CBC W/AUTO DIFF WBC: CPT

## 2018-11-20 PROCEDURE — 84443 ASSAY THYROID STIM HORMONE: CPT

## 2018-11-20 PROCEDURE — 81001 URINALYSIS AUTO W/SCOPE: CPT

## 2018-11-20 PROCEDURE — 83735 ASSAY OF MAGNESIUM: CPT

## 2018-11-20 RX ORDER — FLECAINIDE ACETATE 100 MG/1
100 TABLET ORAL 2 TIMES DAILY
Status: ON HOLD | COMMUNITY
End: 2019-08-29 | Stop reason: HOSPADM

## 2018-11-20 ASSESSMENT — ENCOUNTER SYMPTOMS
ABDOMINAL PAIN: 0
SHORTNESS OF BREATH: 0
RHINORRHEA: 0
DIARRHEA: 0
EYE PAIN: 0
COUGH: 0
VOMITING: 0
BACK PAIN: 0
SORE THROAT: 0
NAUSEA: 0

## 2018-11-20 NOTE — ED PROVIDER NOTES
[facility]  [address] [address]  Phone: [phone]  Fax: [phone]    eMERGENCY dEPARTMENT eNCOUnter          Pt Name: Sherly Whittington  MRN: [id number]  [place] 1960  Date of evaluation: 11/20/2018      CHIEF COMPLAINT       Chief Complaint   Patient presents with    Tachycardia     patient was awakened by a fast heart rate @ 0058, with sob and dizziness. HISTORY OF PRESENT ILLNESS              Sherly Whittington is a 60 y.o. female who presents With palpitations. Does have a history of paroxysmal atrial fibrillation. States this feels similar to prior exacerbations. States that she has flecainide as when necessary however she saw her cardiologist today and was told to take it twice a day regularly. Has not had an ablation. Denies chest pain. Did feel some lightheadedness but currently does not feel it. Denies any chest tightness or heaviness or other chest discomfort. No difficulty breathing. Denies trauma. No fevers or other abnormal symptoms such as cough. Denies other symptoms or concerns. REVIEW OF SYSTEMS         Review of Systems   Constitutional: Negative for chills, fatigue and fever. HENT: Negative for rhinorrhea and sore throat. Eyes: Negative for pain. Respiratory: Negative for cough and shortness of breath. Cardiovascular: Positive for palpitations. Negative for chest pain. Gastrointestinal: Negative for abdominal pain, diarrhea, nausea and vomiting. Genitourinary: Negative for difficulty urinating and dysuria. Musculoskeletal: Negative for back pain and neck pain. Skin: Negative for rash. Neurological: Positive for dizziness. Negative for weakness and headaches. All other systems reviewed and are negative. PAST MEDICAL HISTORY    has a past medical history of Atrial fibrillation ([address] Headache; Hypertension; and Kidney stone.     SURGICAL HISTORY      has a past surgical history that includes Tubal Microscopic Urinalysis   Result Value Ref Range    -          WBC, UA 2 TO 5 0 - 5 /HPF    RBC, UA 5 TO 10 0 - 2 /HPF    Casts UA NOT REPORTED /LPF    Crystals UA NOT REPORTED NONE /HPF    Epithelial Cells UA 2 TO 5 0 - 5 /HPF    Renal Epithelial, Urine NOT REPORTED 0 /HPF    Bacteria, UA NOT REPORTED NONE    Mucus, UA 1+ (A) NONE    Trichomonas, UA NOT REPORTED NONE    Amorphous, UA NOT REPORTED NONE    Other Observations UA Culture ordered based on defined criteria. (A) NREQ    Yeast, UA NOT REPORTED NONE       EMERGENCY DEPARTMENT COURSE:     The patient was given the following medications:  No orders of the defined types were placed in this encounter. Vitals:    Vitals:    11/20/18 0330 11/20/18 0345 11/20/18 0400 11/20/18 0415   BP: (!) 148/72 (!) 146/93 135/73 131/72   Pulse: 57 60 59 58   Resp: 12 15 17 8   Temp:       SpO2: 96% 95% 95% 96%   Weight:       Height:         -------------------------  BP: 131/72, Temp: 97.9 °F (36.6 °C), Pulse: 58, Resp: 8      Re-evaluation Notes    4:56 AM:   Patient doing well on reevaluation. No acute changes. Plan will be to discharge home. Second troponin negative. I do not feel she requires admission for further monitoring. She is asymptomatic. Patient advised to follow-up with her cardiologist or return right away if worse or for any new or concerning symptoms. She is comfortable with this plan. I have reviewed the disposition diagnosis with the patient and or their family/guardian. I have answered their questions and given discharge instructions. They voiced understanding of these instructions and did not have any further questions or complaints. CONSULTS:    None    CRITICAL CARE:     None    PROCEDURES:    None    FINAL IMPRESSION      1.  Paroxysmal atrial fibrillation [facility])          DISPOSITION/PLAN   DISPOSITION Decision To Discharge 11/20/2018 04:44:09 AM      Condition on Disposition    Improved    PATIENT REFERRED TO:  Isha Aldana

## 2018-11-20 NOTE — LETTER
[facility] ED  [address] [address]  Phone: [phone]  Fax: [phone]               November 20, 2018    Patient: Trish Brock   YOB: 1960   Date of Visit: 11/20/2018       To Whom It May Concern:    Jenny Lock was seen and treated in our emergency department on 11/20/2018. She may return to work on 11/21/2018.       Sincerely,       Shilpa Morales RN         Signature:__________________________________

## 2018-11-21 LAB
CULTURE: NO GROWTH
Lab: NORMAL
SPECIMEN DESCRIPTION: NORMAL
STATUS: NORMAL

## 2019-02-09 ENCOUNTER — HOSPITAL ENCOUNTER (EMERGENCY)
Age: 59
Discharge: HOME | End: 2019-02-09
Attending: EMERGENCY MEDICINE
Payer: MEDICARE

## 2019-02-09 ENCOUNTER — APPOINTMENT (OUTPATIENT)
Dept: GENERAL RADIOLOGY | Age: 59
End: 2019-02-09
Payer: MEDICARE

## 2019-02-09 VITALS
BODY MASS INDEX: 27.13 KG/M2 | SYSTOLIC BLOOD PRESSURE: 133 MMHG | OXYGEN SATURATION: 96 % | RESPIRATION RATE: 17 BRPM | WEIGHT: 179 LBS | DIASTOLIC BLOOD PRESSURE: 87 MMHG | HEART RATE: 54 BPM | TEMPERATURE: 97.7 F | HEIGHT: 68 IN

## 2019-02-09 DIAGNOSIS — I48.0 PAROXYSMAL ATRIAL FIBRILLATION (HCC): Primary | ICD-10-CM

## 2019-02-09 LAB
ABSOLUTE EOS #: 0.15 K/UL (ref 0–0.4)
ABSOLUTE IMMATURE GRANULOCYTE: ABNORMAL K/UL (ref 0–0.3)
ABSOLUTE LYMPH #: 2.24 K/UL (ref 1–4.8)
ABSOLUTE MONO #: 0.43 K/UL (ref 0.1–1.2)
ANION GAP SERPL CALCULATED.3IONS-SCNC: 16 MMOL/L (ref 9–17)
BASOPHILS # BLD: 1 % (ref 0–2)
BASOPHILS ABSOLUTE: 0.08 K/UL (ref 0–0.2)
BNP INTERPRETATION: NORMAL
BUN BLDV-MCNC: 19 MG/DL (ref 6–20)
BUN/CREAT BLD: ABNORMAL (ref 9–20)
CALCIUM SERPL-MCNC: 9.2 MG/DL (ref 8.6–10.4)
CHLORIDE BLD-SCNC: 108 MMOL/L (ref 98–107)
CO2: 21 MMOL/L (ref 20–31)
CREAT SERPL-MCNC: 0.73 MG/DL (ref 0.5–0.9)
DIFFERENTIAL TYPE: ABNORMAL
EOSINOPHILS RELATIVE PERCENT: 2 % (ref 1–4)
GFR AFRICAN AMERICAN: >60 ML/MIN
GFR NON-AFRICAN AMERICAN: >60 ML/MIN
GFR SERPL CREATININE-BSD FRML MDRD: ABNORMAL ML/MIN/{1.73_M2}
GFR SERPL CREATININE-BSD FRML MDRD: ABNORMAL ML/MIN/{1.73_M2}
GLUCOSE BLD-MCNC: 129 MG/DL (ref 70–99)
HCT VFR BLD CALC: 49.4 % (ref 36–46)
HEMOGLOBIN: 16.3 G/DL (ref 12–16)
IMMATURE GRANULOCYTES: ABNORMAL %
INR BLD: 1.3
LYMPHOCYTES # BLD: 32 % (ref 24–44)
MCH RBC QN AUTO: 29.9 PG (ref 26–34)
MCHC RBC AUTO-ENTMCNC: 33 G/DL (ref 31–37)
MCV RBC AUTO: 90.6 FL (ref 80–100)
MONOCYTES # BLD: 6 % (ref 2–11)
NRBC AUTOMATED: ABNORMAL PER 100 WBC
PARTIAL THROMBOPLASTIN TIME: 56 SEC (ref 21.3–31.3)
PDW BLD-RTO: 14.3 % (ref 12.5–15.4)
PLATELET # BLD: 290 K/UL (ref 140–450)
PLATELET ESTIMATE: ABNORMAL
PMV BLD AUTO: 10.5 FL (ref 8–14)
POTASSIUM SERPL-SCNC: 3.6 MMOL/L (ref 3.7–5.3)
PRO-BNP: 87 PG/ML
PROTHROMBIN TIME: 13 SEC (ref 9.4–12.6)
RBC # BLD: 5.45 M/UL (ref 4–5.2)
RBC # BLD: ABNORMAL 10*6/UL
SEG NEUTROPHILS: 59 % (ref 36–66)
SEGMENTED NEUTROPHILS ABSOLUTE COUNT: 4.19 K/UL (ref 1.8–7.7)
SODIUM BLD-SCNC: 145 MMOL/L (ref 135–144)
TROPONIN INTERP: NORMAL
TROPONIN T: NORMAL NG/ML
TROPONIN, HIGH SENSITIVITY: <6 NG/L (ref 0–14)
WBC # BLD: 7.1 K/UL (ref 3.5–11)
WBC # BLD: ABNORMAL 10*3/UL

## 2019-02-09 PROCEDURE — 83880 ASSAY OF NATRIURETIC PEPTIDE: CPT

## 2019-02-09 PROCEDURE — 71045 X-RAY EXAM CHEST 1 VIEW: CPT

## 2019-02-09 PROCEDURE — 36415 COLL VENOUS BLD VENIPUNCTURE: CPT

## 2019-02-09 PROCEDURE — 85610 PROTHROMBIN TIME: CPT

## 2019-02-09 PROCEDURE — 80048 BASIC METABOLIC PNL TOTAL CA: CPT

## 2019-02-09 PROCEDURE — 99285 EMERGENCY DEPT VISIT HI MDM: CPT

## 2019-02-09 PROCEDURE — 85025 COMPLETE CBC W/AUTO DIFF WBC: CPT

## 2019-02-09 PROCEDURE — 93005 ELECTROCARDIOGRAM TRACING: CPT

## 2019-02-09 PROCEDURE — 84484 ASSAY OF TROPONIN QUANT: CPT

## 2019-02-09 PROCEDURE — 85730 THROMBOPLASTIN TIME PARTIAL: CPT

## 2019-02-09 RX ORDER — DULOXETIN HYDROCHLORIDE 30 MG/1
30 CAPSULE, DELAYED RELEASE ORAL DAILY
COMMUNITY
End: 2021-02-04

## 2019-02-12 LAB
EKG ATRIAL RATE: 50 BPM
EKG P AXIS: 64 DEGREES
EKG P-R INTERVAL: 186 MS
EKG Q-T INTERVAL: 482 MS
EKG QRS DURATION: 94 MS
EKG QTC CALCULATION (BAZETT): 439 MS
EKG R AXIS: 46 DEGREES
EKG T AXIS: 56 DEGREES
EKG VENTRICULAR RATE: 50 BPM

## 2019-03-09 ENCOUNTER — APPOINTMENT (OUTPATIENT)
Dept: GENERAL RADIOLOGY | Age: 59
End: 2019-03-09
Payer: MEDICARE

## 2019-03-09 ENCOUNTER — HOSPITAL ENCOUNTER (EMERGENCY)
Age: 59
Discharge: HOME | End: 2019-03-10
Attending: EMERGENCY MEDICINE
Payer: MEDICARE

## 2019-03-09 DIAGNOSIS — R00.2 PALPITATIONS: Primary | ICD-10-CM

## 2019-03-09 LAB
ABSOLUTE EOS #: 0.1 K/UL (ref 0–0.4)
ABSOLUTE IMMATURE GRANULOCYTE: ABNORMAL K/UL (ref 0–0.3)
ABSOLUTE LYMPH #: 2.9 K/UL (ref 1–4.8)
ABSOLUTE MONO #: 0.7 K/UL (ref 0.1–1.2)
ANION GAP SERPL CALCULATED.3IONS-SCNC: 15 MMOL/L (ref 9–17)
BASOPHILS # BLD: 1 % (ref 0–2)
BASOPHILS ABSOLUTE: 0.2 K/UL (ref 0–0.2)
BUN BLDV-MCNC: 22 MG/DL (ref 6–20)
BUN/CREAT BLD: ABNORMAL (ref 9–20)
CALCIUM SERPL-MCNC: 9.7 MG/DL (ref 8.6–10.4)
CHLORIDE BLD-SCNC: 106 MMOL/L (ref 98–107)
CO2: 23 MMOL/L (ref 20–31)
CREAT SERPL-MCNC: 0.87 MG/DL (ref 0.5–0.9)
DIFFERENTIAL TYPE: ABNORMAL
EOSINOPHILS RELATIVE PERCENT: 1 % (ref 1–4)
GFR AFRICAN AMERICAN: >60 ML/MIN
GFR NON-AFRICAN AMERICAN: >60 ML/MIN
GFR SERPL CREATININE-BSD FRML MDRD: ABNORMAL ML/MIN/{1.73_M2}
GFR SERPL CREATININE-BSD FRML MDRD: ABNORMAL ML/MIN/{1.73_M2}
GLUCOSE BLD-MCNC: 153 MG/DL (ref 70–99)
HCT VFR BLD CALC: 49.6 % (ref 36–46)
HEMOGLOBIN: 16.3 G/DL (ref 12–16)
IMMATURE GRANULOCYTES: ABNORMAL %
LYMPHOCYTES # BLD: 24 % (ref 24–44)
MCH RBC QN AUTO: 29.3 PG (ref 26–34)
MCHC RBC AUTO-ENTMCNC: 32.8 G/DL (ref 31–37)
MCV RBC AUTO: 89.4 FL (ref 80–100)
MONOCYTES # BLD: 6 % (ref 2–11)
MYOGLOBIN: 21 NG/ML (ref 25–58)
NRBC AUTOMATED: ABNORMAL PER 100 WBC
PDW BLD-RTO: 14 % (ref 12.5–15.4)
PLATELET # BLD: 345 K/UL (ref 140–450)
PLATELET ESTIMATE: ABNORMAL
PMV BLD AUTO: 8.8 FL (ref 6–12)
POTASSIUM SERPL-SCNC: 3.4 MMOL/L (ref 3.7–5.3)
RBC # BLD: 5.56 M/UL (ref 4–5.2)
RBC # BLD: ABNORMAL 10*6/UL
SEG NEUTROPHILS: 68 % (ref 36–66)
SEGMENTED NEUTROPHILS ABSOLUTE COUNT: 8.1 K/UL (ref 1.8–7.7)
SODIUM BLD-SCNC: 144 MMOL/L (ref 135–144)
TROPONIN INTERP: ABNORMAL
TROPONIN T: ABNORMAL NG/ML
TROPONIN, HIGH SENSITIVITY: <6 NG/L (ref 0–14)
WBC # BLD: 12.1 K/UL (ref 3.5–11)
WBC # BLD: ABNORMAL 10*3/UL

## 2019-03-09 PROCEDURE — 36415 COLL VENOUS BLD VENIPUNCTURE: CPT

## 2019-03-09 PROCEDURE — 99285 EMERGENCY DEPT VISIT HI MDM: CPT

## 2019-03-09 PROCEDURE — 80048 BASIC METABOLIC PNL TOTAL CA: CPT

## 2019-03-09 PROCEDURE — 93005 ELECTROCARDIOGRAM TRACING: CPT

## 2019-03-09 PROCEDURE — 85025 COMPLETE CBC W/AUTO DIFF WBC: CPT

## 2019-03-09 PROCEDURE — 71046 X-RAY EXAM CHEST 2 VIEWS: CPT

## 2019-03-09 PROCEDURE — 84484 ASSAY OF TROPONIN QUANT: CPT

## 2019-03-09 PROCEDURE — 83874 ASSAY OF MYOGLOBIN: CPT

## 2019-03-09 ASSESSMENT — ENCOUNTER SYMPTOMS
CONSTIPATION: 0
COLOR CHANGE: 0
SHORTNESS OF BREATH: 1
ABDOMINAL PAIN: 0
SORE THROAT: 0
BACK PAIN: 1
STRIDOR: 0
EYE DISCHARGE: 0
COUGH: 0
DIARRHEA: 0
EYE REDNESS: 0
NAUSEA: 0
VOMITING: 0
WHEEZING: 0
EYE PAIN: 0

## 2019-03-09 ASSESSMENT — PAIN DESCRIPTION - PAIN TYPE: TYPE: ACUTE PAIN

## 2019-03-09 ASSESSMENT — PAIN DESCRIPTION - LOCATION: LOCATION: CHEST;BACK

## 2019-03-09 ASSESSMENT — PAIN SCALES - GENERAL: PAINLEVEL_OUTOF10: 3

## 2019-03-10 VITALS
HEART RATE: 53 BPM | HEIGHT: 68 IN | SYSTOLIC BLOOD PRESSURE: 114 MMHG | BODY MASS INDEX: 26.83 KG/M2 | TEMPERATURE: 98.2 F | DIASTOLIC BLOOD PRESSURE: 67 MMHG | WEIGHT: 177 LBS | OXYGEN SATURATION: 96 % | RESPIRATION RATE: 14 BRPM

## 2019-03-10 LAB
MYOGLOBIN: <21 NG/ML (ref 25–58)
TROPONIN INTERP: ABNORMAL
TROPONIN T: ABNORMAL NG/ML
TROPONIN, HIGH SENSITIVITY: 7 NG/L (ref 0–14)

## 2019-03-11 LAB
EKG ATRIAL RATE: 55 BPM
EKG ATRIAL RATE: 59 BPM
EKG P AXIS: 45 DEGREES
EKG P AXIS: 46 DEGREES
EKG P-R INTERVAL: 186 MS
EKG P-R INTERVAL: 202 MS
EKG Q-T INTERVAL: 442 MS
EKG Q-T INTERVAL: 464 MS
EKG QRS DURATION: 92 MS
EKG QRS DURATION: 96 MS
EKG QTC CALCULATION (BAZETT): 437 MS
EKG QTC CALCULATION (BAZETT): 443 MS
EKG R AXIS: 21 DEGREES
EKG R AXIS: 39 DEGREES
EKG T AXIS: 58 DEGREES
EKG T AXIS: 65 DEGREES
EKG VENTRICULAR RATE: 55 BPM
EKG VENTRICULAR RATE: 59 BPM

## 2019-06-09 ENCOUNTER — APPOINTMENT (OUTPATIENT)
Dept: CT IMAGING | Age: 59
DRG: 204 | End: 2019-06-09
Payer: MEDICARE

## 2019-06-09 ENCOUNTER — HOSPITAL ENCOUNTER (INPATIENT)
Age: 59
LOS: 2 days | Discharge: HOME | DRG: 204 | End: 2019-06-11
Attending: EMERGENCY MEDICINE | Admitting: INTERNAL MEDICINE
Payer: MEDICARE

## 2019-06-09 ENCOUNTER — APPOINTMENT (OUTPATIENT)
Dept: GENERAL RADIOLOGY | Age: 59
DRG: 204 | End: 2019-06-09
Payer: MEDICARE

## 2019-06-09 DIAGNOSIS — R55 SYNCOPE AND COLLAPSE: Primary | ICD-10-CM

## 2019-06-09 DIAGNOSIS — R79.89 ELEVATED SERUM CREATININE: ICD-10-CM

## 2019-06-09 DIAGNOSIS — M25.561 ACUTE PAIN OF RIGHT KNEE: ICD-10-CM

## 2019-06-09 DIAGNOSIS — I48.0 PAROXYSMAL ATRIAL FIBRILLATION (HCC): ICD-10-CM

## 2019-06-09 DIAGNOSIS — R00.1 BRADYCARDIA: ICD-10-CM

## 2019-06-09 DIAGNOSIS — E87.6 HYPOKALEMIA: ICD-10-CM

## 2019-06-09 LAB
ABSOLUTE EOS #: 0.2 K/UL (ref 0–0.4)
ABSOLUTE IMMATURE GRANULOCYTE: ABNORMAL K/UL (ref 0–0.3)
ABSOLUTE LYMPH #: 2.1 K/UL (ref 1–4.8)
ABSOLUTE MONO #: 0.6 K/UL (ref 0.1–1.2)
ALBUMIN SERPL-MCNC: 3.9 G/DL (ref 3.5–5.2)
ALBUMIN/GLOBULIN RATIO: 1.2 (ref 1–2.5)
ALP BLD-CCNC: 75 U/L (ref 35–104)
ALT SERPL-CCNC: 15 U/L (ref 5–33)
ANION GAP SERPL CALCULATED.3IONS-SCNC: 13 MMOL/L (ref 9–17)
AST SERPL-CCNC: 16 U/L
BASOPHILS # BLD: 1 % (ref 0–2)
BASOPHILS ABSOLUTE: 0.1 K/UL (ref 0–0.2)
BILIRUB SERPL-MCNC: 0.36 MG/DL (ref 0.3–1.2)
BUN BLDV-MCNC: 20 MG/DL (ref 8–23)
BUN/CREAT BLD: ABNORMAL (ref 9–20)
CALCIUM SERPL-MCNC: 9.4 MG/DL (ref 8.6–10.4)
CHLORIDE BLD-SCNC: 101 MMOL/L (ref 98–107)
CO2: 22 MMOL/L (ref 20–31)
CREAT SERPL-MCNC: 1.13 MG/DL (ref 0.5–0.9)
DIFFERENTIAL TYPE: ABNORMAL
EOSINOPHILS RELATIVE PERCENT: 1 % (ref 1–4)
GFR AFRICAN AMERICAN: 60 ML/MIN
GFR NON-AFRICAN AMERICAN: 49 ML/MIN
GFR SERPL CREATININE-BSD FRML MDRD: ABNORMAL ML/MIN/{1.73_M2}
GFR SERPL CREATININE-BSD FRML MDRD: ABNORMAL ML/MIN/{1.73_M2}
GLUCOSE BLD-MCNC: 183 MG/DL (ref 70–99)
HCT VFR BLD CALC: 44.7 % (ref 36–46)
HEMOGLOBIN: 15.5 G/DL (ref 12–16)
IMMATURE GRANULOCYTES: ABNORMAL %
INR BLD: 1.1
LYMPHOCYTES # BLD: 17 % (ref 24–44)
MAGNESIUM: 2 MG/DL (ref 1.6–2.6)
MCH RBC QN AUTO: 31.3 PG (ref 26–34)
MCHC RBC AUTO-ENTMCNC: 34.6 G/DL (ref 31–37)
MCV RBC AUTO: 90.5 FL (ref 80–100)
MONOCYTES # BLD: 5 % (ref 2–11)
NRBC AUTOMATED: ABNORMAL PER 100 WBC
PARTIAL THROMBOPLASTIN TIME: 37.5 SEC (ref 21.3–31.3)
PDW BLD-RTO: 13.7 % (ref 12.5–15.4)
PLATELET # BLD: 319 K/UL (ref 140–450)
PLATELET ESTIMATE: ABNORMAL
PMV BLD AUTO: 8.5 FL (ref 6–12)
POTASSIUM SERPL-SCNC: 2.8 MMOL/L (ref 3.7–5.3)
PROTHROMBIN TIME: 11.4 SEC (ref 9.4–12.6)
RBC # BLD: 4.95 M/UL (ref 4–5.2)
RBC # BLD: ABNORMAL 10*6/UL
SEG NEUTROPHILS: 76 % (ref 36–66)
SEGMENTED NEUTROPHILS ABSOLUTE COUNT: 9.1 K/UL (ref 1.8–7.7)
SODIUM BLD-SCNC: 136 MMOL/L (ref 135–144)
TOTAL PROTEIN: 7.1 G/DL (ref 6.4–8.3)
TROPONIN INTERP: ABNORMAL
TROPONIN INTERP: NORMAL
TROPONIN T: ABNORMAL NG/ML
TROPONIN T: NORMAL NG/ML
TROPONIN, HIGH SENSITIVITY: 10 NG/L (ref 0–14)
TROPONIN, HIGH SENSITIVITY: 16 NG/L (ref 0–14)
WBC # BLD: 12 K/UL (ref 3.5–11)
WBC # BLD: ABNORMAL 10*3/UL

## 2019-06-09 PROCEDURE — 36415 COLL VENOUS BLD VENIPUNCTURE: CPT

## 2019-06-09 PROCEDURE — 71046 X-RAY EXAM CHEST 2 VIEWS: CPT

## 2019-06-09 PROCEDURE — 96365 THER/PROPH/DIAG IV INF INIT: CPT

## 2019-06-09 PROCEDURE — 93005 ELECTROCARDIOGRAM TRACING: CPT

## 2019-06-09 PROCEDURE — 85025 COMPLETE CBC W/AUTO DIFF WBC: CPT

## 2019-06-09 PROCEDURE — 6360000002 HC RX W HCPCS: Performed by: EMERGENCY MEDICINE

## 2019-06-09 PROCEDURE — 72100 X-RAY EXAM L-S SPINE 2/3 VWS: CPT

## 2019-06-09 PROCEDURE — 93005 ELECTROCARDIOGRAM TRACING: CPT | Performed by: EMERGENCY MEDICINE

## 2019-06-09 PROCEDURE — 85610 PROTHROMBIN TIME: CPT

## 2019-06-09 PROCEDURE — 84484 ASSAY OF TROPONIN QUANT: CPT

## 2019-06-09 PROCEDURE — 73562 X-RAY EXAM OF KNEE 3: CPT

## 2019-06-09 PROCEDURE — 72125 CT NECK SPINE W/O DYE: CPT

## 2019-06-09 PROCEDURE — 1200000000 HC SEMI PRIVATE

## 2019-06-09 PROCEDURE — 80053 COMPREHEN METABOLIC PANEL: CPT

## 2019-06-09 PROCEDURE — 6370000000 HC RX 637 (ALT 250 FOR IP): Performed by: EMERGENCY MEDICINE

## 2019-06-09 PROCEDURE — 72072 X-RAY EXAM THORAC SPINE 3VWS: CPT

## 2019-06-09 PROCEDURE — 99285 EMERGENCY DEPT VISIT HI MDM: CPT

## 2019-06-09 PROCEDURE — 96375 TX/PRO/DX INJ NEW DRUG ADDON: CPT

## 2019-06-09 PROCEDURE — 83735 ASSAY OF MAGNESIUM: CPT

## 2019-06-09 PROCEDURE — 70450 CT HEAD/BRAIN W/O DYE: CPT

## 2019-06-09 PROCEDURE — 73502 X-RAY EXAM HIP UNI 2-3 VIEWS: CPT

## 2019-06-09 PROCEDURE — 85730 THROMBOPLASTIN TIME PARTIAL: CPT

## 2019-06-09 PROCEDURE — 83880 ASSAY OF NATRIURETIC PEPTIDE: CPT

## 2019-06-09 RX ORDER — MORPHINE SULFATE 4 MG/ML
4 INJECTION, SOLUTION INTRAMUSCULAR; INTRAVENOUS ONCE
Status: COMPLETED | OUTPATIENT
Start: 2019-06-09 | End: 2019-06-09

## 2019-06-09 RX ORDER — NICOTINE 21 MG/24HR
1 PATCH, TRANSDERMAL 24 HOURS TRANSDERMAL DAILY PRN
Status: DISCONTINUED | OUTPATIENT
Start: 2019-06-09 | End: 2019-06-10

## 2019-06-09 RX ORDER — ONDANSETRON 2 MG/ML
4 INJECTION INTRAMUSCULAR; INTRAVENOUS EVERY 6 HOURS PRN
Status: DISCONTINUED | OUTPATIENT
Start: 2019-06-09 | End: 2019-06-11 | Stop reason: HOSPADM

## 2019-06-09 RX ORDER — POTASSIUM CHLORIDE 7.45 MG/ML
10 INJECTION INTRAVENOUS PRN
Status: DISCONTINUED | OUTPATIENT
Start: 2019-06-09 | End: 2019-06-11 | Stop reason: HOSPADM

## 2019-06-09 RX ORDER — DABIGATRAN ETEXILATE 75 MG/1
150 CAPSULE, COATED PELLETS ORAL 2 TIMES DAILY
Status: DISCONTINUED | OUTPATIENT
Start: 2019-06-10 | End: 2019-06-11 | Stop reason: HOSPADM

## 2019-06-09 RX ORDER — FLECAINIDE ACETATE 50 MG/1
100 TABLET ORAL 2 TIMES DAILY
Status: DISCONTINUED | OUTPATIENT
Start: 2019-06-10 | End: 2019-06-11 | Stop reason: HOSPADM

## 2019-06-09 RX ORDER — POTASSIUM CHLORIDE 20MEQ/15ML
40 LIQUID (ML) ORAL PRN
Status: DISCONTINUED | OUTPATIENT
Start: 2019-06-09 | End: 2019-06-11 | Stop reason: HOSPADM

## 2019-06-09 RX ORDER — SODIUM CHLORIDE 0.9 % (FLUSH) 0.9 %
10 SYRINGE (ML) INJECTION PRN
Status: DISCONTINUED | OUTPATIENT
Start: 2019-06-09 | End: 2019-06-11 | Stop reason: HOSPADM

## 2019-06-09 RX ORDER — METOPROLOL SUCCINATE 50 MG/1
100 TABLET, EXTENDED RELEASE ORAL DAILY
Status: DISCONTINUED | OUTPATIENT
Start: 2019-06-10 | End: 2019-06-11

## 2019-06-09 RX ORDER — HYDROCHLOROTHIAZIDE 25 MG/1
25 TABLET ORAL DAILY
Status: DISCONTINUED | OUTPATIENT
Start: 2019-06-10 | End: 2019-06-11 | Stop reason: HOSPADM

## 2019-06-09 RX ORDER — ACETAMINOPHEN 325 MG/1
650 TABLET ORAL EVERY 4 HOURS PRN
Status: DISCONTINUED | OUTPATIENT
Start: 2019-06-09 | End: 2019-06-11 | Stop reason: HOSPADM

## 2019-06-09 RX ORDER — MAGNESIUM SULFATE 1 G/100ML
1 INJECTION INTRAVENOUS PRN
Status: DISCONTINUED | OUTPATIENT
Start: 2019-06-09 | End: 2019-06-11 | Stop reason: HOSPADM

## 2019-06-09 RX ORDER — POTASSIUM CHLORIDE 20 MEQ/1
20 TABLET, EXTENDED RELEASE ORAL ONCE
Status: COMPLETED | OUTPATIENT
Start: 2019-06-09 | End: 2019-06-09

## 2019-06-09 RX ORDER — ALBUTEROL SULFATE 90 UG/1
2 AEROSOL, METERED RESPIRATORY (INHALATION) EVERY 4 HOURS PRN
Status: DISCONTINUED | OUTPATIENT
Start: 2019-06-09 | End: 2019-06-11 | Stop reason: HOSPADM

## 2019-06-09 RX ORDER — POTASSIUM CHLORIDE 7.45 MG/ML
10 INJECTION INTRAVENOUS ONCE
Status: COMPLETED | OUTPATIENT
Start: 2019-06-09 | End: 2019-06-09

## 2019-06-09 RX ORDER — LEVOTHYROXINE SODIUM 0.07 MG/1
75 TABLET ORAL DAILY
Status: DISCONTINUED | OUTPATIENT
Start: 2019-06-10 | End: 2019-06-11 | Stop reason: HOSPADM

## 2019-06-09 RX ORDER — POTASSIUM CHLORIDE 20 MEQ/1
40 TABLET, EXTENDED RELEASE ORAL PRN
Status: DISCONTINUED | OUTPATIENT
Start: 2019-06-09 | End: 2019-06-11 | Stop reason: HOSPADM

## 2019-06-09 RX ORDER — SODIUM CHLORIDE 0.9 % (FLUSH) 0.9 %
10 SYRINGE (ML) INJECTION EVERY 12 HOURS SCHEDULED
Status: DISCONTINUED | OUTPATIENT
Start: 2019-06-10 | End: 2019-06-11 | Stop reason: HOSPADM

## 2019-06-09 RX ORDER — SODIUM CHLORIDE 9 MG/ML
INJECTION, SOLUTION INTRAVENOUS CONTINUOUS
Status: DISCONTINUED | OUTPATIENT
Start: 2019-06-10 | End: 2019-06-11 | Stop reason: HOSPADM

## 2019-06-09 RX ADMIN — POTASSIUM CHLORIDE 20 MEQ: 20 TABLET, EXTENDED RELEASE ORAL at 22:08

## 2019-06-09 RX ADMIN — MORPHINE SULFATE 4 MG: 4 INJECTION INTRAVENOUS at 23:33

## 2019-06-09 RX ADMIN — POTASSIUM CHLORIDE 10 MEQ: 7.46 INJECTION, SOLUTION INTRAVENOUS at 22:08

## 2019-06-09 ASSESSMENT — ENCOUNTER SYMPTOMS
COUGH: 0
VOMITING: 0
SHORTNESS OF BREATH: 0
EYE PAIN: 0
BACK PAIN: 1
SORE THROAT: 0
ABDOMINAL PAIN: 0
DIARRHEA: 0
NAUSEA: 0
RHINORRHEA: 0

## 2019-06-09 ASSESSMENT — PAIN SCALES - GENERAL
PAINLEVEL_OUTOF10: 7
PAINLEVEL_OUTOF10: 7

## 2019-06-09 ASSESSMENT — PAIN DESCRIPTION - DESCRIPTORS: DESCRIPTORS: SORE

## 2019-06-09 ASSESSMENT — PAIN DESCRIPTION - ORIENTATION: ORIENTATION: RIGHT;UPPER

## 2019-06-09 ASSESSMENT — PAIN DESCRIPTION - LOCATION: LOCATION: BACK;HIP;KNEE

## 2019-06-09 ASSESSMENT — PAIN DESCRIPTION - FREQUENCY: FREQUENCY: CONTINUOUS

## 2019-06-09 ASSESSMENT — PAIN DESCRIPTION - PAIN TYPE: TYPE: ACUTE PAIN

## 2019-06-10 PROBLEM — R31.29 MICROSCOPIC HEMATURIA: Status: ACTIVE | Noted: 2018-05-17

## 2019-06-10 PROBLEM — I48.0 PAROXYSMAL ATRIAL FIBRILLATION (HCC): Status: ACTIVE | Noted: 2018-02-09

## 2019-06-10 PROBLEM — N28.89 RENAL MASS: Status: ACTIVE | Noted: 2019-05-13

## 2019-06-10 PROBLEM — Z86.39 H/O THYROID NODULE: Status: ACTIVE | Noted: 2019-04-30

## 2019-06-10 PROBLEM — I10 ESSENTIAL HYPERTENSION, BENIGN: Status: ACTIVE | Noted: 2019-06-10

## 2019-06-10 PROBLEM — E03.8 OTHER SPECIFIED HYPOTHYROIDISM: Status: ACTIVE | Noted: 2019-04-30

## 2019-06-10 PROBLEM — E78.2 MIXED HYPERLIPIDEMIA: Status: ACTIVE | Noted: 2018-02-09

## 2019-06-10 LAB
ANION GAP SERPL CALCULATED.3IONS-SCNC: 11 MMOL/L (ref 9–17)
BNP INTERPRETATION: NORMAL
BUN BLDV-MCNC: 15 MG/DL (ref 8–23)
BUN/CREAT BLD: ABNORMAL (ref 9–20)
CALCIUM SERPL-MCNC: 8.4 MG/DL (ref 8.6–10.4)
CHLORIDE BLD-SCNC: 109 MMOL/L (ref 98–107)
CO2: 23 MMOL/L (ref 20–31)
CREAT SERPL-MCNC: 0.91 MG/DL (ref 0.5–0.9)
EKG ATRIAL RATE: 53 BPM
EKG ATRIAL RATE: 59 BPM
EKG P AXIS: 40 DEGREES
EKG P AXIS: 51 DEGREES
EKG P-R INTERVAL: 196 MS
EKG P-R INTERVAL: 210 MS
EKG Q-T INTERVAL: 460 MS
EKG Q-T INTERVAL: 478 MS
EKG QRS DURATION: 100 MS
EKG QRS DURATION: 96 MS
EKG QTC CALCULATION (BAZETT): 448 MS
EKG QTC CALCULATION (BAZETT): 455 MS
EKG R AXIS: 36 DEGREES
EKG R AXIS: 59 DEGREES
EKG T AXIS: 41 DEGREES
EKG T AXIS: 66 DEGREES
EKG VENTRICULAR RATE: 53 BPM
EKG VENTRICULAR RATE: 59 BPM
GFR AFRICAN AMERICAN: >60 ML/MIN
GFR NON-AFRICAN AMERICAN: >60 ML/MIN
GFR SERPL CREATININE-BSD FRML MDRD: ABNORMAL ML/MIN/{1.73_M2}
GFR SERPL CREATININE-BSD FRML MDRD: ABNORMAL ML/MIN/{1.73_M2}
GLUCOSE BLD-MCNC: 109 MG/DL (ref 70–99)
HCT VFR BLD CALC: 39.9 % (ref 36–46)
HEMOGLOBIN: 13.7 G/DL (ref 12–16)
MAGNESIUM: 2.1 MG/DL (ref 1.6–2.6)
MCH RBC QN AUTO: 31.3 PG (ref 26–34)
MCHC RBC AUTO-ENTMCNC: 34.4 G/DL (ref 31–37)
MCV RBC AUTO: 91 FL (ref 80–100)
NRBC AUTOMATED: ABNORMAL PER 100 WBC
PDW BLD-RTO: 13.6 % (ref 12.5–15.4)
PLATELET # BLD: 266 K/UL (ref 140–450)
PMV BLD AUTO: 8.3 FL (ref 6–12)
POTASSIUM SERPL-SCNC: 3.2 MMOL/L (ref 3.7–5.3)
PRO-BNP: 115 PG/ML
RBC # BLD: 4.39 M/UL (ref 4–5.2)
SODIUM BLD-SCNC: 143 MMOL/L (ref 135–144)
TROPONIN INTERP: NORMAL
TROPONIN INTERP: NORMAL
TROPONIN T: NORMAL NG/ML
TROPONIN T: NORMAL NG/ML
TROPONIN, HIGH SENSITIVITY: 12 NG/L (ref 0–14)
TROPONIN, HIGH SENSITIVITY: 12 NG/L (ref 0–14)
WBC # BLD: 12.4 K/UL (ref 3.5–11)

## 2019-06-10 PROCEDURE — 6370000000 HC RX 637 (ALT 250 FOR IP): Performed by: NURSE PRACTITIONER

## 2019-06-10 PROCEDURE — 2580000003 HC RX 258: Performed by: NURSE PRACTITIONER

## 2019-06-10 PROCEDURE — 97116 GAIT TRAINING THERAPY: CPT

## 2019-06-10 PROCEDURE — APPSS45 APP SPLIT SHARED TIME 31-45 MINUTES: Performed by: CLINICAL NURSE SPECIALIST

## 2019-06-10 PROCEDURE — 97166 OT EVAL MOD COMPLEX 45 MIN: CPT

## 2019-06-10 PROCEDURE — 84484 ASSAY OF TROPONIN QUANT: CPT

## 2019-06-10 PROCEDURE — 93005 ELECTROCARDIOGRAM TRACING: CPT | Performed by: NURSE PRACTITIONER

## 2019-06-10 PROCEDURE — 80048 BASIC METABOLIC PNL TOTAL CA: CPT

## 2019-06-10 PROCEDURE — 1200000000 HC SEMI PRIVATE

## 2019-06-10 PROCEDURE — 85027 COMPLETE CBC AUTOMATED: CPT

## 2019-06-10 PROCEDURE — 83735 ASSAY OF MAGNESIUM: CPT

## 2019-06-10 PROCEDURE — 97535 SELF CARE MNGMENT TRAINING: CPT

## 2019-06-10 PROCEDURE — 97162 PT EVAL MOD COMPLEX 30 MIN: CPT

## 2019-06-10 PROCEDURE — 36415 COLL VENOUS BLD VENIPUNCTURE: CPT

## 2019-06-10 PROCEDURE — 99222 1ST HOSP IP/OBS MODERATE 55: CPT | Performed by: INTERNAL MEDICINE

## 2019-06-10 PROCEDURE — 6370000000 HC RX 637 (ALT 250 FOR IP): Performed by: CLINICAL NURSE SPECIALIST

## 2019-06-10 RX ORDER — HYDROCODONE BITARTRATE AND ACETAMINOPHEN 5; 325 MG/1; MG/1
1 TABLET ORAL EVERY 6 HOURS PRN
Status: DISCONTINUED | OUTPATIENT
Start: 2019-06-10 | End: 2019-06-10

## 2019-06-10 RX ORDER — POTASSIUM CHLORIDE 1.5 G/1.77G
20 POWDER, FOR SOLUTION ORAL DAILY
Status: ON HOLD | COMMUNITY
End: 2019-06-11 | Stop reason: SDUPTHER

## 2019-06-10 RX ORDER — HYDROCODONE BITARTRATE AND ACETAMINOPHEN 5; 325 MG/1; MG/1
2 TABLET ORAL EVERY 4 HOURS PRN
Status: DISCONTINUED | OUTPATIENT
Start: 2019-06-10 | End: 2019-06-11 | Stop reason: HOSPADM

## 2019-06-10 RX ORDER — HYDROCODONE BITARTRATE AND ACETAMINOPHEN 5; 325 MG/1; MG/1
1 TABLET ORAL EVERY 4 HOURS PRN
Status: DISCONTINUED | OUTPATIENT
Start: 2019-06-10 | End: 2019-06-11 | Stop reason: HOSPADM

## 2019-06-10 RX ADMIN — HYDROCODONE BITARTRATE AND ACETAMINOPHEN 1 TABLET: 5; 325 TABLET ORAL at 05:56

## 2019-06-10 RX ADMIN — SODIUM CHLORIDE: 9 INJECTION, SOLUTION INTRAVENOUS at 00:49

## 2019-06-10 RX ADMIN — DABIGATRAN ETEXILATE MESYLATE 150 MG: 75 CAPSULE ORAL at 21:21

## 2019-06-10 RX ADMIN — LEVOTHYROXINE SODIUM 75 MCG: 75 TABLET ORAL at 05:42

## 2019-06-10 RX ADMIN — POTASSIUM CHLORIDE 40 MEQ: 20 TABLET, EXTENDED RELEASE ORAL at 07:35

## 2019-06-10 RX ADMIN — HYDROCHLOROTHIAZIDE 25 MG: 25 TABLET ORAL at 10:45

## 2019-06-10 RX ADMIN — FLECAINIDE ACETATE 100 MG: 50 TABLET ORAL at 00:49

## 2019-06-10 RX ADMIN — HYDROCODONE BITARTRATE AND ACETAMINOPHEN 1 TABLET: 5; 325 TABLET ORAL at 21:22

## 2019-06-10 RX ADMIN — ACETAMINOPHEN 650 MG: 325 TABLET ORAL at 03:42

## 2019-06-10 RX ADMIN — HYDROCODONE BITARTRATE AND ACETAMINOPHEN 1 TABLET: 5; 325 TABLET ORAL at 16:55

## 2019-06-10 RX ADMIN — DABIGATRAN ETEXILATE MESYLATE 150 MG: 75 CAPSULE ORAL at 09:26

## 2019-06-10 RX ADMIN — FLECAINIDE ACETATE 100 MG: 50 TABLET ORAL at 10:45

## 2019-06-10 RX ADMIN — SODIUM CHLORIDE: 9 INJECTION, SOLUTION INTRAVENOUS at 14:36

## 2019-06-10 RX ADMIN — DABIGATRAN ETEXILATE MESYLATE 150 MG: 75 CAPSULE ORAL at 00:49

## 2019-06-10 RX ADMIN — FLECAINIDE ACETATE 100 MG: 50 TABLET ORAL at 21:21

## 2019-06-10 RX ADMIN — HYDROCODONE BITARTRATE AND ACETAMINOPHEN 1 TABLET: 5; 325 TABLET ORAL at 10:49

## 2019-06-10 ASSESSMENT — PAIN DESCRIPTION - ONSET
ONSET: ON-GOING

## 2019-06-10 ASSESSMENT — PAIN SCALES - GENERAL
PAINLEVEL_OUTOF10: 6
PAINLEVEL_OUTOF10: 0
PAINLEVEL_OUTOF10: 5
PAINLEVEL_OUTOF10: 5
PAINLEVEL_OUTOF10: 9
PAINLEVEL_OUTOF10: 5
PAINLEVEL_OUTOF10: 8
PAINLEVEL_OUTOF10: 6
PAINLEVEL_OUTOF10: 0
PAINLEVEL_OUTOF10: 5
PAINLEVEL_OUTOF10: 8
PAINLEVEL_OUTOF10: 6
PAINLEVEL_OUTOF10: 8
PAINLEVEL_OUTOF10: 8

## 2019-06-10 ASSESSMENT — ENCOUNTER SYMPTOMS
BLOOD IN STOOL: 0
CHOKING: 0
CONSTIPATION: 0
TROUBLE SWALLOWING: 1
VOMITING: 0
SHORTNESS OF BREATH: 1
NAUSEA: 0
EYES NEGATIVE: 1

## 2019-06-10 ASSESSMENT — PAIN - FUNCTIONAL ASSESSMENT

## 2019-06-10 ASSESSMENT — PAIN DESCRIPTION - ORIENTATION
ORIENTATION: RIGHT

## 2019-06-10 ASSESSMENT — PAIN DESCRIPTION - FREQUENCY
FREQUENCY: CONTINUOUS

## 2019-06-10 ASSESSMENT — PAIN DESCRIPTION - DESCRIPTORS
DESCRIPTORS: DISCOMFORT;SORE
DESCRIPTORS: SORE;DISCOMFORT
DESCRIPTORS: DISCOMFORT;SORE
DESCRIPTORS: SORE
DESCRIPTORS: DISCOMFORT;SORE
DESCRIPTORS: SORE;DISCOMFORT
DESCRIPTORS: DISCOMFORT;SORE

## 2019-06-10 ASSESSMENT — PAIN DESCRIPTION - LOCATION
LOCATION: KNEE

## 2019-06-10 ASSESSMENT — PAIN DESCRIPTION - PROGRESSION

## 2019-06-10 ASSESSMENT — PAIN DESCRIPTION - PAIN TYPE
TYPE: ACUTE PAIN

## 2019-06-10 NOTE — PROGRESS NOTES
Physical Therapy    Facility/Department: [facility] PROGRESSIVE CARE  Initial Assessment    NAME: Tiara Branham  : 1960  MRN: [id number]    Date of Service: 6/10/2019  Chief Complaint   Patient presents with   [facility] Fall     patient sts she fell in the shower at 2000. reports bilat knee pain, right hip and mid back pain. unsure if there was LOC, unwitnessed    Back Pain    Knee Injury    Hip Pain       Discharge Recommendations:  Continue to assess pending progress, Patient would benefit from continued therapy after discharge   Pt would benefit from use of RW based on demonstrated mobility secondary to noted improvements in her balance and overall gait with use of device. Pt may benefit from additional therapy upon discharge specifically for her right knee pending her overall improved strength and ROM following this fall. PT Equipment Recommendations  Equipment Needed: Yes  Mobility Devices: [place] Ricardo: Rolling    Assessment   Body structures, Functions, Activity limitations: Decreased functional mobility ; Decreased strength;Decreased endurance;Decreased balance;Decreased ROM  Assessment: Pt with noted minor ROM and strength deficits in her right knee secondary to pain. Pt able to ambulate functional household distances with RW. Pt would benefit from use of RW based on demonstrated mobility secondary to noted improvements in her balance and overall gait with use of device. Pt may benefit from additional therapy upon discharge specifically for her right knee pending her overall improved strength and ROM following this fall. Prognosis: Good  Decision Making: Medium Complexity  Patient Education: Educated on use of RW, importance of mobility and rest, stair negotiation techniques  Barriers to Learning: None  REQUIRES PT FOLLOW UP: Yes  Activity Tolerance  Activity Tolerance: Patient limited by pain; Patient Tolerated treatment well  Activity Tolerance: Limited by right knee pain and minor dizziness Layout: One level  Home Access: Stairs to enter with rails  Entrance Stairs - Number of Steps: 3  Entrance Stairs - Rails: Both(Grab bar on one side and rail on the other)  Bathroom Shower/Tub: Walk-in shower  Bathroom Toilet: Standard  Bathroom Equipment: Grab bars in shower, Built-in shower seat  Home Equipment: Cane(Pt reports use of cane as needed but uses it when she is dizzy.)  Receives Help From: Family  ADL Assistance: Independent(Pt reports sitting for lower body secondary to dizziness at times from her A-fib)  Homemaking Assistance: Needs assistance  Homemaking Responsibilities: Yes(Pt reports she does as much as she can with cooking, cleaning and laundry and states lately her  has been helping much more.)  Meal Prep Responsibility: Primary  Laundry Responsibility: Primary  Cleaning Responsibility: Primary  Shopping Responsibility: Secondary( has been doing in lately but she was previously going)  Ambulation Assistance: Independent(Pt reports use of cane in and out of the home)  Transfer Assistance: Independent  Active : No  Patient's  Info: Pt reports she hasn't driven in about 3 months. Her  does the driving but states she hasn't even really been out lately because of how she has been feeling. Mode of Transportation: Family, SUV  Occupation: Part time employment  Type of occupation: [employer]- states she has applied for disability recently  Leisure & Hobbies: Likes to play on the computer, watch tv. Pt reports she would love to get back to walking in the [place]. Additional Comments: Pt reports her  works a couple days per week- is retired but working part time. Pt reports it is quite flexible.      Objective          AROM RLE (degrees)  RLE AROM: WFL  RLE General AROM: WFL at knee but very guarded from pain and does not reach full TKE  AROM LLE (degrees)  LLE AROM : WFL  AROM RUE (degrees)  RUE AROM : WFL  AROM LUE (degrees)  LUE AROM : WFL  Strength RLE  Strength RLE: Exception  Comment: Limited by pain  R Hip Flexion: 4/5  R Knee Flexion: 4-/5  R Knee Extension: 4-/5  R Ankle Dorsiflexion: 4+/5  R Ankle Plantar flexion: 4+/5  Strength LLE  Comment: grossly 4+/5  Strength RUE  Comment: Co evaluation with OT- see OT evaluation for full UE assessment  Strength LUE  Comment: Co evaluation with OT- see OT evaluation for full UE assessment     Sensation  Overall Sensation Status: WFL(Pt denies any numbness or tingling)  Bed mobility  Supine to Sit: Supervision(HOB raised)  Sit to Supine: (Pt retired to chair at end of session)  Scooting: Supervision  Comment: Pt denies any lightheaded or dizziness upon sitting. Transfers  Sit to Stand: Stand by assistance  Stand to sit: Stand by assistance  Comment: Increased time with noted antalgia on RLE  Ambulation  Ambulation?: Yes  More Ambulation?: Yes  Ambulation 1  Surface: level tile  Device: Single point cane  Assistance: Contact guard assistance  Quality of Gait: mild unsteady with noted R LE antalgia, decreased step length, decreased gait speed, cues for use of cane in left hand secondary to right knee antalgia  Gait Deviations: Slow Jackelin;Decreased step length;Decreased arm swing  Distance: 140ft  Comments: Pt notes minor increase in dizziness- standing rest break with minor improvement. Able to continue gait with trial of RW at documented below. Ambulation 2  Surface - 2: level tile  Device 2: Rolling Walker  Assistance 2: Contact guard assistance  Quality of Gait 2: mild unsteady but improved some from [facility], noted R LE antalgia although improved some from use of SPC, decreased step length, decreased gait speed  Gait Deviations: Slow Jackelin;Decreased step length  Distance: 110ft  Comments: Pt reports improved ease with use of RW.   Stairs/Curb  Stairs?: Yes  Stairs  # Steps : 2  Stairs Height: 6\"  Rails: Bilateral  Device: No Device  Assistance: Contact guard assistance  Comment: Cues for sequencing secondary to right knee antalgia     Balance  Posture: Good  Sitting - Static: Good  Sitting - Dynamic: Good  Standing - Static: Fair;+  Standing - Dynamic: Fair;+  Comments: standing balance assessed with SPC- good (-) balance with use of RW        Plan   Plan  Times per week: 5x  Times per day: Daily  Current Treatment Recommendations: Strengthening, Balance Training, Functional Mobility Training, Transfer Training, Gait Training, Stair training, Endurance Training, Home Exercise Program, Patient/Caregiver Education & Training, ROM  Safety Devices  Type of devices: Call light within reach, Gait belt, Left in chair, Chair alarm in place, Nurse notified  Restraints  Initially in place: No      AM-PAC Score      [facility] AM PAC: 19/24       Goals  Short term goals  Time Frame for Short term goals: 14 visits  Short term goal 1: Pt to ambulate 350ft modified independent with rolling walker versus single point cane to allow for return to prior functional level  Short term goal 2: Pt to sit <> stand transfer independently to allow for return to prior functional level  Short term goal 3: Pt to tolerate 30 minutes of therapy for endurance  Short term goal 4: Pt to ascend/descend three steps with bilateral rails modified independently to allow for safe entrance/exit into her home  Short term goal 5: Pt to demonstrate good to good - standing balance to decrease risk of falls  Patient Goals   Patient goals : Pt would like to get back to walking in the park       Therapy Time   Individual Concurrent Group Co-treatment   Time In 0829         Time Out 0908         Minutes 39         Timed Code Treatment Minutes: [address] [address] [place], PT

## 2019-06-10 NOTE — PROGRESS NOTES
Occupational Therapy   Occupational Therapy Initial Assessment  Date: 6/10/2019   Patient Name: Jama Lockhart  MRN: [id number]     : 1960    Date of Service: 6/10/2019    Discharge Recommendations:  No further occupational therapy recommended at discharge. OT Equipment Recommendations  Equipment Needed: Yes  Mobility Devices: Stephen Ramosam: Rolling    Assessment   Performance deficits / Impairments: Decreased functional mobility ; Decreased ADL status; Decreased endurance;Decreased balance;Decreased high-level IADLs;Decreased safe awareness  Prognosis: Good  Decision Making: Medium Complexity  Patient Education: OT Services/OT POC, Safety Awareness/Fall Prevention, Safety with Transfers/RW Mngt, ADL Techniques, Techniques to Manage Dizziness with Activity/Positional Changes, Home Safety, good return  REQUIRES OT FOLLOW UP: Yes  Activity Tolerance  Activity Tolerance: Patient limited by pain  Safety Devices  Safety Devices in place: Yes  Type of devices: Nurse notified; Left in chair;Call light within reach; Chair alarm in place  Restraints  Initially in place: No           Patient Diagnosis(es): The primary encounter diagnosis was Syncope and collapse. Diagnoses of Bradycardia, Hypokalemia, and Elevated serum creatinine were also pertinent to this visit. has a past medical history of Atrial fibrillation ([address] Headache, Hypertension, Kidney mass, and Kidney stone.    has a past surgical history that includes Tubal ligation (Bilateral) and cyst removal.         Restrictions  Restrictions/Precautions  Restrictions/Precautions: Fall Risk  Required Braces or Orthoses?: No  Position Activity Restriction  Other position/activity restrictions: up with assistance, negative imaging for spine and R hip/knee    Subjective   General  Chart Reviewed: Progress Notes, History and Physical, Imaging, Labs  Patient assessed for rehabilitation services?: Yes  Family / Caregiver Present: No  General Comment  Comments: RN ok'd for therapy this AM. Pt agreeable to participate in session and pleasant/cooperative throughout. Pain Assessment  Pain Assessment: 0-10  Pain Level: 6  Patient's Stated Pain Goal: 4  Pain Type: Acute pain  Pain Location: Knee  Pain Orientation: Right  Pain Descriptors: Discomfort; Sore  Pain Frequency: Continuous  Pain Onset: On-going  Clinical Progression: Not changed  Functional Pain Assessment: Prevents or interferes some active activities and ADLs  Non-Pharmaceutical Pain Intervention(s): Ambulation/Increased Activity; Distraction;Repositioned;Cold applied  Response to Pain Intervention: Patient Satisfied     Social/Functional History  Social/Functional History  Lives With: Spouse(Pt reports her granddaughter lives there at times as well.)  Type of Home: House  Home Layout: One level  Home Access: Stairs to enter with rails  Entrance Stairs - Number of Steps: 3  Entrance Stairs - Rails: Both(Grab bar on one side and rail on the other)  Bathroom Shower/Tub: Walk-in shower  Bathroom Toilet: Standard  Bathroom Equipment: Grab bars in shower, Built-in shower seat  Home Equipment: Cane(Pt reports use of cane as needed but uses it when she is dizzy.)  Receives Help From: Family  ADL Assistance: Independent(Pt reports sitting for lower body secondary to dizziness at times from her A-fib)  Homemaking Assistance: Needs assistance  Homemaking Responsibilities: Yes(Pt reports she does as much as she can with cooking, cleaning and laundry and states lately her  has been helping much more.)  Meal Prep Responsibility: Primary  Laundry Responsibility: Primary  Cleaning Responsibility: Primary  Shopping Responsibility: Secondary( has been doing in lately but she was previously going)  Ambulation Assistance: Independent(Pt reports use of cane in and out of the home)  Transfer Assistance: Independent  Active : No  Patient's  Info: Pt reports she hasn't driven in about 3 months.  Her  does the

## 2019-06-10 NOTE — PLAN OF CARE
Problem: Falls - Risk of:  Goal: Will remain free from falls  Description  Will remain free from falls  6/10/2019 1152 by Brando Salas RN  Outcome: Ongoing  6/10/2019 0346 by Russell Osei RN  Outcome: Ongoing   Bed and chair alarm on dependent of patient location, call light in reach and patient calling out for assistance with ambulation appropriately. Bed & chair low and locked, bedside table near patient reach.

## 2019-06-10 NOTE — H&P
[facility]    HISTORY AND PHYSICAL EXAMINATION            Date:   6/10/2019  Patient name:  Dariwn Tejada  Date of admission:  6/9/2019  8:14 PM  MRN:   [id number]  Account:  [de-identified]  YOB: 1960  PCP:    Jenny Torre DO  Room:   304/304-01  Code Status:    Full Code    Chief Complaint:     Chief Complaint   Patient presents with    Fall     patient sts she fell in the shower at 2000. reports bilat knee pain, right hip and mid back pain. unsure if there was LOC, unwitnessed    Back Pain    Knee Injury    Hip Pain       History Obtained From:     patient, electronic medical record    History of Present Illness: The patient is a 60 y.o. female who presents with Fall (patient sts she fell in the shower at 2000. reports bilat knee pain, right hip and mid back pain. unsure if there was LOC, unwitnessed); Back Pain; Knee Injury; and Hip Pain   and she is admitted to the hospital for the management of Syncope with collapse. Marcela Hoyt has a hx of PAF x 4 yrs. She is a pt of Dr. Merrick Espinosa and he is planning an ablation in July. She has been having dizzy spells with increasing frequency and lasting longer over the last 3 months. She often feels shakey and occas notices her heart racing. The pt has stopped driving because of the episodes. She has never had a loop recorder or holter monitor. Pt blacked out while showering around 7:30pm.  She had no symptoms of feeling weak or lightheaded. She awoke on the floor of the shower with her rt leg twisted underneath her. CT of brain and Cspine were neg as were xrays of Thoracic/Lumbar spine, Rt hip and Rt knee. Troponin was 10 and 16. K was 2.8 - pt reports low K x > 10 yrs. Pt was admitted for monitoring.   Orthostatic vitals are normal.    Past Medical History:     Past Medical History:   Diagnosis Date    Atrial fibrillation ([address]     Headache     migraine's since 2003    Hypertension     Kidney mass     Kidney stone         Past Surgical History:     Past Surgical History:   Procedure Laterality Date    CYST REMOVAL      On top of head    TUBAL LIGATION Bilateral         Medications Prior to Admission:     Prior to Admission medications    Medication Sig Start Date End Date Taking? Authorizing Provider   potassium chloride (KLOR-CON) 20 MEQ packet Take 20 mEq by mouth daily   Yes Historical Provider, MD   DULoxetine (CYMBALTA) 30 MG extended release capsule Take 30 mg by mouth daily   Yes Historical Provider, MD   flecainide (TAMBOCOR) 100 MG tablet Take 100 mg by mouth 2 times daily   Yes Historical Provider, MD   levothyroxine (SYNTHROID) 25 MCG tablet Take 75 mcg by mouth Daily    Yes Historical Provider, MD   dabigatran (PRADAXA) 150 MG capsule Take 150 mg by mouth 2 times daily   Yes Historical Provider, MD   hydrochlorothiazide (HYDRODIURIL) 25 MG tablet Take 25 mg by mouth daily   Yes Historical Provider, MD   metoprolol succinate (TOPROL XL) 25 MG extended release tablet Take 100 mg by mouth daily    Yes Historical Provider, MD   Topiramate (TOPAMAX PO) Take 100 mg by mouth 2 times daily    Yes Historical Provider, MD   albuterol sulfate HFA (PROVENTIL HFA) 108 (90 BASE) MCG/ACT inhaler Inhale 2 puffs into the lungs every 4 hours as needed for Wheezing or Shortness of Breath (Space out to every 6 hours as symptoms improve) Space out to every 6 hours as symptoms improve. 2/29/16   Hudson Ponce DO        Allergies:     Aspartame and phenylalanine and Penicillins    Social History:     Tobacco:    reports that she has never smoked. She has never used smokeless tobacco.  Alcohol:      reports that she drank alcohol. Drug Use:  reports that she does not use drugs.     Family History:     Family History   Problem Relation Age of Onset    Diabetes type 2  Mother     Hypertension Mother    Malcolm Carry Other Father 27        pedestrian struck by auto    Cancer Sister Normal rate and regular rhythm. No murmur heard. Pulmonary/Chest: Effort normal and breath sounds normal. No respiratory distress. She has no rales. She exhibits no tenderness. Abdominal: Soft. Bowel sounds are normal. There is no tenderness. Lymphadenopathy:     She has no cervical adenopathy. Neurological: She is alert and oriented to person, place, and time. No cranial nerve deficit or sensory deficit. Skin: Skin is warm and dry. Psychiatric: She has a normal mood and affect.        Investigations:      Laboratory Testing:  Recent Results (from the past 24 hour(s))   EKG 12 Lead    Collection Time: 06/09/19  8:46 PM   Result Value Ref Range    Ventricular Rate 59 BPM    Atrial Rate 59 BPM    P-R Interval 196 ms    QRS Duration 100 ms    Q-T Interval 460 ms    QTc Calculation (Bazett) 455 ms    P Axis 40 degrees    R Axis 59 degrees    T Axis 41 degrees   Troponin    Collection Time: 06/09/19  8:59 PM   Result Value Ref Range    Troponin, High Sensitivity 10 0 - 14 ng/L    Troponin T NOT REPORTED <0.03 ng/mL    Troponin Interp NOT REPORTED    Comprehensive Metabolic Panel    Collection Time: 06/09/19  8:59 PM   Result Value Ref Range    Glucose 183 (H) 70 - 99 mg/dL    BUN 20 8 - 23 mg/dL    CREATININE 1.13 (H) 0.50 - 0.90 mg/dL    Bun/Cre Ratio NOT REPORTED 9 - 20    Calcium 9.4 8.6 - 10.4 mg/dL    Sodium 136 135 - 144 mmol/L    Potassium 2.8 (LL) 3.7 - 5.3 mmol/L    Chloride 101 98 - 107 mmol/L    CO2 22 20 - 31 mmol/L    Anion Gap 13 9 - 17 mmol/L    Alkaline Phosphatase 75 35 - 104 U/L    ALT 15 5 - 33 U/L    AST 16 <32 U/L    Total Bilirubin 0.36 0.3 - 1.2 mg/dL    Total Protein 7.1 6.4 - 8.3 g/dL    Alb 3.9 3.5 - 5.2 g/dL    Albumin/Globulin Ratio 1.2 1.0 - 2.5    GFR Non- 49 (L) >60 mL/min    GFR African American 60 (L) >60 mL/min    GFR Comment          GFR Staging NOT REPORTED    CBC Auto Differential    Collection Time: 06/09/19  8:59 PM   Result Value Ref Range    WBC 12.0 (H) 3.5 - 11.0 k/uL    RBC 4.95 4.0 - 5.2 m/uL    Hemoglobin 15.5 12.0 - 16.0 g/dL    Hematocrit 44.7 36 - 46 %    MCV 90.5 80 - 100 fL    MCH 31.3 26 - 34 pg    MCHC 34.6 31 - 37 g/dL    RDW 13.7 12.5 - 15.4 %    Platelets 058 700 - 976 k/uL    MPV 8.5 6.0 - 12.0 fL    NRBC Automated NOT REPORTED per 100 WBC    Differential Type NOT REPORTED     Seg Neutrophils 76 (H) 36 - 66 %    Lymphocytes 17 (L) 24 - 44 %    Monocytes 5 2 - 11 %    Eosinophils % 1 1 - 4 %    Basophils 1 0 - 2 %    Immature Granulocytes NOT REPORTED 0 %    Segs Absolute 9.10 (H) 1.8 - 7.7 k/uL    Absolute Lymph # 2.10 1.0 - 4.8 k/uL    Absolute Mono # 0.60 0.1 - 1.2 k/uL    Absolute Eos # 0.20 0.0 - 0.4 k/uL    Basophils # 0.10 0.0 - 0.2 k/uL    Absolute Immature Granulocyte NOT REPORTED 0.00 - 0.30 k/uL    WBC Morphology NOT REPORTED     RBC Morphology NOT REPORTED     Platelet Estimate NOT REPORTED    Magnesium    Collection Time: 06/09/19  8:59 PM   Result Value Ref Range    Magnesium 2.0 1.6 - 2.6 mg/dL   Protime-INR    Collection Time: 06/09/19  8:59 PM   Result Value Ref Range    Protime 11.4 9.4 - 12.6 sec    INR 1.1    APTT    Collection Time: 06/09/19  8:59 PM   Result Value Ref Range    PTT 37.5 (H) 21.3 - 31.3 sec   Troponin    Collection Time: 06/09/19 11:23 PM   Result Value Ref Range    Troponin, High Sensitivity 16 (H) 0 - 14 ng/L    Troponin T NOT REPORTED <0.03 ng/mL    Troponin Interp NOT REPORTED    Brain Natriuretic Peptide    Collection Time: 06/09/19 11:23 PM   Result Value Ref Range    Pro- <300 pg/mL    BNP Interpretation Pro-BNP Reference Range:    EKG 12 Lead    Collection Time: 06/10/19 12:56 AM   Result Value Ref Range    Ventricular Rate 53 BPM    Atrial Rate 53 BPM    P-R Interval 210 ms    QRS Duration 96 ms    Q-T Interval 478 ms    QTc Calculation (Bazett) 448 ms    P Axis 51 degrees    R Axis 36 degrees    T Axis 66 degrees   Troponin    Collection Time: 06/10/19  3:24 AM   Result Value Ref Range Troponin, High Sensitivity 12 0 - 14 ng/L    Troponin T NOT REPORTED <0.03 ng/mL    Troponin Interp NOT REPORTED    Basic Metabolic Panel w/ Reflex to MG    Collection Time: 06/10/19  6:58 AM   Result Value Ref Range    Glucose 109 (H) 70 - 99 mg/dL    BUN 15 8 - 23 mg/dL    CREATININE 0.91 (H) 0.50 - 0.90 mg/dL    Bun/Cre Ratio NOT REPORTED 9 - 20    Calcium 8.4 (L) 8.6 - 10.4 mg/dL    Sodium 143 135 - 144 mmol/L    Potassium 3.2 (L) 3.7 - 5.3 mmol/L    Chloride 109 (H) 98 - 107 mmol/L    CO2 23 20 - 31 mmol/L    Anion Gap 11 9 - 17 mmol/L    GFR Non-African American >60 >60 mL/min    GFR African American >60 >60 mL/min    GFR Comment          GFR Staging NOT REPORTED    Magnesium    Collection Time: 06/10/19  6:58 AM   Result Value Ref Range    Magnesium 2.1 1.6 - 2.6 mg/dL   CBC    Collection Time: 06/10/19  6:58 AM   Result Value Ref Range    WBC 12.4 (H) 3.5 - 11.0 k/uL    RBC 4.39 4.0 - 5.2 m/uL    Hemoglobin 13.7 12.0 - 16.0 g/dL    Hematocrit 39.9 36 - 46 %    MCV 91.0 80 - 100 fL    MCH 31.3 26 - 34 pg    MCHC 34.4 31 - 37 g/dL    RDW 13.6 12.5 - 15.4 %    Platelets 147 744 - 381 k/uL    MPV 8.3 6.0 - 12.0 fL    NRBC Automated NOT REPORTED per 100 WBC   Troponin    Collection Time: 06/10/19  6:58 AM   Result Value Ref Range    Troponin, High Sensitivity 12 0 - 14 ng/L    Troponin T NOT REPORTED <0.03 ng/mL    Troponin Interp NOT REPORTED        Imaging/Diagnostics:    Xr Chest Standard (2 Vw)  Result Date: 6/9/2019  No significant findings in the chest.       Xr Thoracic Spine (3 Views)  Result Date: 6/9/2019  No significant findings in the thoracic spine. Xr Lumbar Spine (2-3 Views)  Result Date: 6/9/2019  Degenerative disc disease and facet hypertrophy most pronounced in the lower lumbar spine without acute abnormality. Xr Knee Right (3 Views)  Result Date: 6/9/2019  No acute finding in the pelvis or right hip. No acute finding in the right knee as well.        Ct Head Wo Contrast  Result Date: 6/9/2019  No acute intracranial abnormality. Ct Cervical Spine Wo Contrast  Result Date: 6/9/2019  No acute abnormality of the cervical spine. Xr Hip 2-3 Vw W Pelvis Right  Result Date: 6/9/2019  No acute finding in the pelvis or right hip. No acute finding in the right knee as well. Assessment :      Primary Problem:  Syncope and collapse    Active Hospital Problems    Diagnosis Date Noted    Essential hypertension, benign [I10] 06/10/2019    Bradycardia [R00.1]     Hypokalemia [E87.6]     Syncope and collapse [R55] 06/09/2019    Other specified hypothyroidism [E03.8] 04/30/2019    Paroxysmal atrial fibrillation ([facility] [address] [I48.0] 02/09/2018       Plan:     Patient status Admit as inpatient in the  Med/Surge    1. Replace Potassium  2. Metoprolol held this am due to heart rate 50  3. Cardiology to [facility].  If pt needs to be transferred she would prefer [facility] or [facility] as that is where her cardiologist practices  4. Ice packs and pain management for Rt knee  5. Pt and family updated and agreeable to plan    Consultations:   IP CONSULT TO SOCIAL WORK  IP CONSULT TO CARDIOLOGY    Patient is admitted as inpatient status because of co-morbidities listed above, severity of signs and symptoms as outlined, requirement for current medical therapies and most importantly because of direct risk to patient if care not provided in a hospital setting.     BRITTA Granda - CNS  6/10/2019  11:35 AM    Copy sent to Dr. Jenny Torre DO

## 2019-06-10 NOTE — PLAN OF CARE
Problem: Falls - Risk of:  Goal: Will remain free from falls  Description  Will remain free from falls  Outcome: Ongoing   The patient remained free from falls this shift, call light within reach, bed in locked and lowest position. Side rails up x2. Bed alar, activated. Patient uses cane to ambulate. Patient calls out appropriately. Continue to monitor closely.

## 2019-06-10 NOTE — PROGRESS NOTES
Patient arrive to PCU unit room 304 via wheelchair with spouse at bedside. Admission hx completed, vitals taken, and telemetry placed. Patient instructed on use of call light and introduced to room. No distress noted.

## 2019-06-10 NOTE — ED PROVIDER NOTES
(Bilateral) and cyst removal.    CURRENT MEDICATIONS       Current Discharge Medication List      CONTINUE these medications which have NOT CHANGED    Details   potassium chloride (KLOR-CON) 20 MEQ packet Take 20 mEq by mouth daily      DULoxetine (CYMBALTA) 30 MG extended release capsule Take 30 mg by mouth daily      flecainide (TAMBOCOR) 100 MG tablet Take 100 mg by mouth 2 times daily      levothyroxine (SYNTHROID) 25 MCG tablet Take 75 mcg by mouth Daily       dabigatran (PRADAXA) 150 MG capsule Take 150 mg by mouth 2 times daily      hydrochlorothiazide (HYDRODIURIL) 25 MG tablet Take 25 mg by mouth daily      metoprolol succinate (TOPROL XL) 25 MG extended release tablet Take 100 mg by mouth daily       Topiramate (TOPAMAX PO) Take 100 mg by mouth 2 times daily       albuterol sulfate HFA (PROVENTIL HFA) 108 (90 BASE) MCG/ACT inhaler Inhale 2 puffs into the lungs every 4 hours as needed for Wheezing or Shortness of Breath (Space out to every 6 hours as symptoms improve) Space out to every 6 hours as symptoms improve. Qty: 1 Inhaler, Refills: 0             ALLERGIES     is allergic to aspartame and phenylalanine and penicillins. FAMILY HISTORY     has no family status information on file. family history is not on file. SOCIAL HISTORY      reports that she has never smoked. She has never used smokeless tobacco. She reports that she drank alcohol. She reports that she does not use drugs. PHYSICAL EXAM     INITIAL VITALS:  height is 5' 8\" (1.727 m) and weight is 80.7 kg (178 lb). Her oral temperature is 98.3 °F (36.8 °C). Her blood pressure is 151/87 (abnormal) and her pulse is 59. Her respiration is 20 and oxygen saturation is 94%. Physical Exam   Constitutional: She is oriented to person, place, and time. She appears well-developed and well-nourished. Non-toxic appearance. She does not appear ill. No distress. HENT:   Head: Normocephalic and atraumatic.    Right Ear: External ear normal. Left Ear: External ear normal.   Eyes: Pupils are equal, round, and reactive to light. Conjunctivae, EOM and lids are normal.   Neck: Normal range of motion and full passive range of motion without pain. Neck supple. No spinous process tenderness and no muscular tenderness present. No tracheal deviation present. Cardiovascular: Normal rate and regular rhythm. Pulmonary/Chest: Effort normal and breath sounds normal. No respiratory distress. Abdominal: Soft. There is no tenderness. There is no rigidity and no guarding. Musculoskeletal:   Neck nontender. Mid/lower thoracic and upper lumbar has mild tenderness to the midline and paraspinal region. No ecchymosis. No skin lesions. Right hip has very mild tenderness. Pelvis exam normal.  Right knee has mild anterior tenderness without edema, erythema, skin lesions or ecchymosis. Range of motion does reproduce the pain. Normal distal pulses, motor and sensation. Otherwise unremarkable muscular skeletal exam.   Neurological: She is alert and oriented to person, place, and time. She has normal strength and normal reflexes. No cranial nerve deficit or sensory deficit. Coordination normal. GCS eye subscore is 4. GCS verbal subscore is 5. GCS motor subscore is 6. No focal neurologic deficits. Conversing normally. Skin: Skin is warm and dry. Psychiatric: She has a normal mood and affect. Her speech is normal.   Vitals reviewed. DIFFERENTIAL DIAGNOSIS/ MDM:       Plan at this time will be to initiate a further syncopal workup. We'll also check imaging. Patient is on Pradaxa. Not currently in atrial fibrillation. Mildly bradycardic.     DIAGNOSTIC RESULTS     EKG: All EKG's are interpreted by the Emergency Department Physician who either signs or Co-signs this chart in the absence of a cardiologist.    Interpreted by Synthia Lombard, MD     Rhythm: sinus   Rate: 59  Axis: normal  Ectopy: none  Conduction: normal  ST Segments: no acute change  T Waves: no acute change    Clinical Impression:  sinus rhythm with no acute changes. Nonspecific EKG. No acute infarction/ischemia noted. RADIOLOGY:   I directly visualized the following  images and reviewed the radiologist interpretations:  XR THORACIC SPINE (3 VIEWS)   Final Result   No significant findings in the thoracic spine. XR LUMBAR SPINE (2-3 VIEWS)   Final Result   Degenerative disc disease and facet hypertrophy most pronounced in the lower   lumbar spine without acute abnormality. XR HIP 2-3 VW W PELVIS RIGHT   Final Result   No acute finding in the pelvis or right hip. No acute finding in the right   knee as well. XR KNEE RIGHT (3 VIEWS)   Final Result   No acute finding in the pelvis or right hip. No acute finding in the right   knee as well. XR CHEST STANDARD (2 VW)   Final Result   No significant findings in the chest.         CT Cervical Spine WO Contrast   Final Result   No acute abnormality of the cervical spine. CT Head WO Contrast   Final Result   No acute intracranial abnormality. No results found.     LABS:  Results for orders placed or performed during the hospital encounter of 06/09/19   Troponin   Result Value Ref Range    Troponin, High Sensitivity 10 0 - 14 ng/L    Troponin T NOT REPORTED <0.03 ng/mL    Troponin Interp NOT REPORTED    Troponin   Result Value Ref Range    Troponin, High Sensitivity 16 (H) 0 - 14 ng/L    Troponin T NOT REPORTED <0.03 ng/mL    Troponin Interp NOT REPORTED    Comprehensive Metabolic Panel   Result Value Ref Range    Glucose 183 (H) 70 - 99 mg/dL    BUN 20 8 - 23 mg/dL    CREATININE 1.13 (H) 0.50 - 0.90 mg/dL    Bun/Cre Ratio NOT REPORTED 9 - 20    Calcium 9.4 8.6 - 10.4 mg/dL    Sodium 136 135 - 144 mmol/L    Potassium 2.8 (LL) 3.7 - 5.3 mmol/L    Chloride 101 98 - 107 mmol/L    CO2 22 20 - 31 mmol/L    Anion Gap 13 9 - 17 mmol/L    Alkaline Phosphatase 75 35 - 104 U/L    ALT 15 5 - 33 U/L    AST 16 tablet 20 mEq    albuterol sulfate  (90 Base) MCG/ACT inhaler 2 puff    dabigatran (PRADAXA) capsule 150 mg    flecainide (TAMBOCOR) tablet 100 mg    hydrochlorothiazide (HYDRODIURIL) tablet 25 mg    levothyroxine (SYNTHROID) tablet 75 mcg    metoprolol succinate (TOPROL XL) extended release tablet 100 mg    0.9 % sodium chloride infusion    sodium chloride flush 0.9 % injection 10 mL    sodium chloride flush 0.9 % injection 10 mL    OR Linked Order Group     potassium chloride (KLOR-CON M) extended release tablet 40 mEq     potassium chloride 20 MEQ/15ML (10%) oral solution 40 mEq     potassium chloride 10 mEq/100 mL IVPB (Peripheral Line)    magnesium sulfate 1 g in dextrose 5% 100 mL IVPB    magnesium hydroxide (MILK OF MAGNESIA) 400 MG/5ML suspension 30 mL    ondansetron (ZOFRAN) injection 4 mg    nicotine (NICODERM CQ) 21 MG/24HR 1 patch    acetaminophen (TYLENOL) tablet 650 mg    morphine injection 4 mg        Vitals:    Vitals:    06/09/19 2100 06/09/19 2200 06/09/19 2300 06/10/19 0001   BP:   127/76 (!) 151/87   Pulse: 71 57 55 59   Resp: 16 20 19 20   Temp:    98.3 °F (36.8 °C)   TempSrc:    Oral   SpO2: 95% 95% 96% 94%   Weight:       Height:         -------------------------  BP: (!) 151/87, Temp: 98.3 °F (36.8 °C), Pulse: 59, Resp: 20      Re-evaluation Notes    10:33 PM:   Patient doing well on reevaluation. No acute changes. Patient does have some hyponatremia. Chest pain or difficulty breathing at this time. I did speak with the hospitalist who accepted admission of the patient. First troponin negative. ECG unremarkable. Minor hyperglycemia. Imaging negative for acute findings. Patient updated and agreeable to admission. Heart rate has remained primarily in the 50s on my rechecks. She is awake, alert and appropriate. Unsure if her beta blocker dose is too high. The nurse did note a few episodes of bradycardia in the 40s.       CONSULTS:    None    CRITICAL CARE: None    PROCEDURES:    None    FINAL IMPRESSION      1. Syncope and collapse    2. Bradycardia    3. Hypokalemia    4.  Elevated serum creatinine          DISPOSITION/PLAN   DISPOSITION Admitted 06/09/2019 10:42:35 PM      Condition on Disposition    Improved    PATIENT REFERRED TO:  Marvin Lowe DO  [address] [place] [address] [phone]            DISCHARGE MEDICATIONS:  Current Discharge Medication List          (Please note that portions of this note were completed with a voice recognition program.  Efforts were made to edit the dictations but occasionally words are mis-transcribed.)    Oriana Quiñones DO  Attending Emergency Physician        Oriana Quiñones DO  06/10/19 0025

## 2019-06-10 NOTE — PROGRESS NOTES
Smoking Cessation - topics covered   []  Health Risks  []  Benefits of Quitting   []  Smoking Cessation  [x]  Patient has no history of tobacco use  []  Patient is former smoker. Patient quit in   [x]  No need for tobacco cessation education. []  Booklet given  []  Patient verbalizes understanding. []  Patient denies need for tobacco cessation education. []  Unable to meet with patient today. Will follow up as able.   Ben Pichardo  7:21 AM

## 2019-06-10 NOTE — FLOWSHEET NOTE
06/10/19 0037   Provider Notification   Reason for Communication Evaluate; Review case  David Barbie, medication order, and trop )   Provider Name Jessee Alonzo   Provider Notification Nurse Practitioner   Method of Communication Secure Message   Response Waiting for response   Notification Time 0037   RN notified Jessee Alonzo CNP, of orthostatic BPs and elevated trop of 16. New orders: Stat EKG  RN notified CNP EKG results. No new orders at this time.

## 2019-06-10 NOTE — CARE COORDINATION
Case Management Initial Discharge Plan  Rylie Pedraza,             Met with:patient to discuss discharge plans. Information verified: address, contacts, phone number, , insurance Yes  PCP: Aracelis Krens DO  Date of last visit: unknown    Insurance Provider: [employer]    Discharge Planning    Living Arrangements:  Spouse/Significant Other   Support Systems:  Spouse/Significant Other    Home has 1 stories  3 stairs to climb to get into front door, n/astairs to climb to reach second floor  Location of bedroom/bathroom in home main level    Patient able to perform ADL's:Independent    Current Services (outpatient & in home) none  DME equipment: cane  [facility]    [facility]armacy: [facility]   Potential Assistance Purchasing Medications:  No  Does patient want to participate in local refill/ meds to beds program?  Yes    Potential Assistance Needed:  N/A    Patient agreeable to home care: No  [place] of choice provided:  n/a    Prior SNF/Rehab Placement and Facility: none  Agreeable to SNF/Rehab: No  [place] of choice provided: n/a   Evaluation: no    Expected Discharge date:  19  Patient expects to be discharged to:  home  Follow Up Appointment: Best Day/ Time: Monday AM    Transportation provider:   Transportation arrangements needed for discharge: No- to assist    Readmission Risk              Risk of Unplanned Readmission:        15             Does patient have a readmission risk score greater than 14?: Yes  If yes, follow-up appointment must be made within 7 days of discharge.      Discharge Plan: Home          Electronically signed by RODOLFO He on 6/10/19 at 10:40 AM

## 2019-06-10 NOTE — PROGRESS NOTES
Cardiology consult through [facility] sent to Dr. Yakelin Cali, since no response from [facility]

## 2019-06-10 NOTE — PROGRESS NOTES
No response from Cardology throught [facility] and office does not have  to academy working. Searching for different way to contact.

## 2019-06-11 VITALS
RESPIRATION RATE: 16 BRPM | HEART RATE: 60 BPM | HEIGHT: 68 IN | OXYGEN SATURATION: 97 % | SYSTOLIC BLOOD PRESSURE: 136 MMHG | DIASTOLIC BLOOD PRESSURE: 79 MMHG | WEIGHT: 148.37 LBS | BODY MASS INDEX: 22.49 KG/M2 | TEMPERATURE: 98.3 F

## 2019-06-11 LAB
ANION GAP SERPL CALCULATED.3IONS-SCNC: 9 MMOL/L (ref 9–17)
BUN BLDV-MCNC: 9 MG/DL (ref 8–23)
BUN/CREAT BLD: ABNORMAL (ref 9–20)
CALCIUM SERPL-MCNC: 8.7 MG/DL (ref 8.6–10.4)
CHLORIDE BLD-SCNC: 111 MMOL/L (ref 98–107)
CO2: 25 MMOL/L (ref 20–31)
CREAT SERPL-MCNC: 0.75 MG/DL (ref 0.5–0.9)
GFR AFRICAN AMERICAN: >60 ML/MIN
GFR NON-AFRICAN AMERICAN: >60 ML/MIN
GFR SERPL CREATININE-BSD FRML MDRD: ABNORMAL ML/MIN/{1.73_M2}
GFR SERPL CREATININE-BSD FRML MDRD: ABNORMAL ML/MIN/{1.73_M2}
GLUCOSE BLD-MCNC: 98 MG/DL (ref 70–99)
POTASSIUM SERPL-SCNC: 4 MMOL/L (ref 3.7–5.3)
SODIUM BLD-SCNC: 145 MMOL/L (ref 135–144)

## 2019-06-11 PROCEDURE — APPSS30 APP SPLIT SHARED TIME 16-30 MINUTES: Performed by: CLINICAL NURSE SPECIALIST

## 2019-06-11 PROCEDURE — 6370000000 HC RX 637 (ALT 250 FOR IP): Performed by: NURSE PRACTITIONER

## 2019-06-11 PROCEDURE — 99232 SBSQ HOSP IP/OBS MODERATE 35: CPT | Performed by: INTERNAL MEDICINE

## 2019-06-11 PROCEDURE — 2580000003 HC RX 258: Performed by: NURSE PRACTITIONER

## 2019-06-11 PROCEDURE — 97110 THERAPEUTIC EXERCISES: CPT

## 2019-06-11 PROCEDURE — 97116 GAIT TRAINING THERAPY: CPT

## 2019-06-11 PROCEDURE — 80048 BASIC METABOLIC PNL TOTAL CA: CPT

## 2019-06-11 PROCEDURE — 97535 SELF CARE MNGMENT TRAINING: CPT

## 2019-06-11 PROCEDURE — 36415 COLL VENOUS BLD VENIPUNCTURE: CPT

## 2019-06-11 RX ORDER — METOPROLOL SUCCINATE 50 MG/1
50 TABLET, EXTENDED RELEASE ORAL DAILY
Qty: 30 TABLET | Refills: 3 | Status: ON HOLD | OUTPATIENT
Start: 2019-06-12 | End: 2019-08-29 | Stop reason: HOSPADM

## 2019-06-11 RX ORDER — HYDROCODONE BITARTRATE AND ACETAMINOPHEN 5; 325 MG/1; MG/1
1 TABLET ORAL EVERY 4 HOURS PRN
Qty: 30 TABLET | Refills: 0 | Status: SHIPPED | OUTPATIENT
Start: 2019-06-11 | End: 2019-06-11

## 2019-06-11 RX ORDER — HYDROCODONE BITARTRATE AND ACETAMINOPHEN 5; 325 MG/1; MG/1
1 TABLET ORAL EVERY 4 HOURS PRN
Qty: 10 TABLET | Refills: 0 | Status: SHIPPED | OUTPATIENT
Start: 2019-06-11 | End: 2019-06-18

## 2019-06-11 RX ORDER — METOPROLOL SUCCINATE 50 MG/1
50 TABLET, EXTENDED RELEASE ORAL DAILY
Status: DISCONTINUED | OUTPATIENT
Start: 2019-06-11 | End: 2019-06-11 | Stop reason: HOSPADM

## 2019-06-11 RX ORDER — POTASSIUM CHLORIDE 1.5 G/1.77G
20 POWDER, FOR SOLUTION ORAL 3 TIMES DAILY
Qty: 30 EACH | Refills: 3 | Status: SHIPPED | OUTPATIENT
Start: 2019-06-11 | End: 2020-01-28 | Stop reason: SDUPTHER

## 2019-06-11 RX ADMIN — DABIGATRAN ETEXILATE MESYLATE 150 MG: 75 CAPSULE ORAL at 09:25

## 2019-06-11 RX ADMIN — SODIUM CHLORIDE: 9 INJECTION, SOLUTION INTRAVENOUS at 04:08

## 2019-06-11 RX ADMIN — LEVOTHYROXINE SODIUM 75 MCG: 75 TABLET ORAL at 05:07

## 2019-06-11 RX ADMIN — FLECAINIDE ACETATE 100 MG: 50 TABLET ORAL at 09:28

## 2019-06-11 RX ADMIN — METOPROLOL SUCCINATE 50 MG: 50 TABLET, EXTENDED RELEASE ORAL at 09:28

## 2019-06-11 RX ADMIN — HYDROCHLOROTHIAZIDE 25 MG: 25 TABLET ORAL at 09:28

## 2019-06-11 RX ADMIN — Medication 10 ML: at 09:29

## 2019-06-11 ASSESSMENT — PAIN DESCRIPTION - ORIENTATION
ORIENTATION: RIGHT

## 2019-06-11 ASSESSMENT — PAIN DESCRIPTION - PAIN TYPE
TYPE: ACUTE PAIN

## 2019-06-11 ASSESSMENT — PAIN DESCRIPTION - PROGRESSION: CLINICAL_PROGRESSION: GRADUALLY IMPROVING

## 2019-06-11 ASSESSMENT — PAIN DESCRIPTION - LOCATION
LOCATION: KNEE

## 2019-06-11 ASSESSMENT — PAIN DESCRIPTION - DESCRIPTORS
DESCRIPTORS: SORE;JABBING
DESCRIPTORS: SORE;JABBING

## 2019-06-11 ASSESSMENT — PAIN SCALES - GENERAL
PAINLEVEL_OUTOF10: 3
PAINLEVEL_OUTOF10: 2
PAINLEVEL_OUTOF10: 1

## 2019-06-11 ASSESSMENT — PAIN DESCRIPTION - FREQUENCY: FREQUENCY: CONTINUOUS

## 2019-06-11 ASSESSMENT — PAIN DESCRIPTION - ONSET: ONSET: ON-GOING

## 2019-06-11 ASSESSMENT — PAIN - FUNCTIONAL ASSESSMENT: PAIN_FUNCTIONAL_ASSESSMENT: ACTIVITIES ARE NOT PREVENTED

## 2019-06-11 NOTE — PROGRESS NOTES
[facility]    Progress Note    6/11/2019    6:57 AM    Name:   Jane Foss  MRN:     [id number]     Acct:      [de-identified]   Room:   [address] [place] [address]  IP Day:  2  Admit Date:  6/9/2019  8:14 PM    PCP:   Connie Gill DO  Code Status:  Full Code    Subjective:     C/C:   Chief Complaint   Patient presents with   [facility] Fall     patient sts she fell in the shower at 2000. reports bilat knee pain, right hip and mid back pain. unsure if there was LOC, unwitnessed    Back Pain    Knee Injury    Hip Pain     Interval History Status: improved. There have been no arrhythmias, dizzy spells or syncopal events since admission. Her right knee pain is better she has ambulated in the [place]. Cardiology evaluated her this morning and agrees with cutting the Toprol dose. They advised keeping her systolic blood pressure in the 140-150 range. Potassium levels have normalized    Brief History:     Micaela Veronica has a hx of PAF x 4 yrs. She is a pt of Dr. Darek Barker and he is planning an ablation in July. She has been having dizzy spells with increasing frequency and lasting longer over the last 3 months. She often feels shakey and occas notices her heart racing. The pt has stopped driving because of the episodes. She has never had a loop recorder or holter monitor.       Pt blacked out while showering around 7:30pm.  She had no symptoms of feeling weak or lightheaded. She awoke on the floor of the shower with her rt leg twisted underneath her.      CT of brain and Cspine were neg as were xrays of Thoracic/Lumbar spine, Rt hip and Rt knee. Troponin was 10 and 16. K was 2.8 - pt reports low K x > 10 yrs. Pt was admitted for monitoring. Orthostatic vitals are positive.     Review of Systems:     Constitutional:  negative for chills, fevers, sweats  Respiratory:  negative for cough, dyspnea on exertion, shortness of breath, wheezing  Cardiovascular:  negative for chest pain, chest pressure/discomfort, lower extremity edema, palpitations  Gastrointestinal:  negative for abdominal pain, constipation, diarrhea, nausea, vomiting  Neurological:  negative for dizziness, headache    Medications: Allergies: Allergies   Allergen Reactions    Aspartame And Phenylalanine     Penicillins Diarrhea       Current Meds:   Scheduled Meds:    dabigatran  150 mg Oral BID    flecainide  100 mg Oral BID    hydrochlorothiazide  25 mg Oral Daily    levothyroxine  75 mcg Oral Daily    metoprolol succinate  100 mg Oral Daily    sodium chloride flush  10 mL Intravenous 2 times per day     Continuous Infusions:    sodium chloride Stopped (19 0409)     PRN Meds: HYDROcodone 5 mg - acetaminophen **OR** HYDROcodone 5 mg - acetaminophen, albuterol sulfate HFA, sodium chloride flush, potassium chloride **OR** potassium alternative oral replacement **OR** potassium chloride, magnesium sulfate, magnesium hydroxide, ondansetron, acetaminophen    Data:     Past Medical History:   has a past medical history of Atrial fibrillation ([address] Headache, Hypertension, Kidney mass, and Kidney stone. Social History:   reports that she has never smoked. She has never used smokeless tobacco. She reports that she drank alcohol. She reports that she does not use drugs. Family History:   Family History   Problem Relation Age of Onset    Diabetes type 2  Mother     Hypertension Mother     Other Father 27        pedestrian struck by auto    Cancer Sister        Vitals:  BP (!) 148/85   Pulse 57   Temp 98.1 °F (36.7 °C) (Oral)   Resp 18   Ht 5' 8\" (1.727 m)   Wt 148 lb 5.9 oz (67.3 kg)   SpO2 97%   BMI 22.56 kg/m²   Temp (24hrs), Av.3 °F (36.8 °C), Min:97.7 °F (36.5 °C), Max:99.2 °F (37.3 °C)    No results for input(s): POCGLU in the last 72 hours. I/O (24Hr):     Intake/Output Summary (Last 24 hours) at 2019 0653  Last data filed at 2019 0508  Gross per 24 hour   Intake 2134.9 ml   Output 2500 ml   Net -365.1 ml       Labs:    Hematology:  Recent Labs     06/09/19  2059 06/10/19  0658   WBC 12.0* 12.4*   RBC 4.95 4.39   HGB 15.5 13.7   HCT 44.7 39.9   MCV 90.5 91.0   MCH 31.3 31.3   MCHC 34.6 34.4   RDW 13.7 13.6    266   MPV 8.5 8.3   INR 1.1  --      Chemistry:  Recent Labs     06/09/19 2059 06/09/19  2323 06/10/19  0324 06/10/19  0658     --   --  143   K 2.8*  --   --  3.2*     --   --  109*   CO2 22  --   --  23   GLUCOSE 183*  --   --  109*   BUN 20  --   --  15   CREATININE 1.13*  --   --  0.91*   MG 2.0  --   --  2.1   ANIONGAP 13  --   --  11   LABGLOM 49*  --   --  >60   GFRAA 60*  --   --  >60   CALCIUM 9.4  --   --  8.4*   PROBNP  --  115  --   --    TROPHS 10 16* 12 12     Recent Labs     06/09/19 2059   PROT 7.1   LABALBU 3.9   AST 16   ALT 15   ALKPHOS 75   BILITOT 0.36         Lab Results   Component Value Date/Time    SPECIAL NOT REPORTED 11/20/2018 03:05 AM     Lab Results   Component Value Date/Time    CULTURE NO GROWTH 11/20/2018 03:05 AM         Radiology:    Joesph Tripp Chest Standard (2 Vw)  Result Date: 6/9/2019  No significant findings in the chest.         Xr Thoracic Spine (3 Views)  Result Date: 6/9/2019  No significant findings in the thoracic spine. Xr Lumbar Spine (2-3 Views)  Result Date: 6/9/2019  Degenerative disc disease and facet hypertrophy most pronounced in the lower lumbar spine without acute abnormality. Xr Knee Right (3 Views)  Result Date: 6/9/2019  No acute finding in the pelvis or right hip. No acute finding in the right knee as well. Ct Head Wo Contrast  Result Date: 6/9/2019  No acute intracranial abnormality. Ct Cervical Spine Wo Contrast  Result Date: 6/9/2019  No acute abnormality of the cervical spine. Xr Hip 2-3 Vw W Pelvis Right  Result Date: 6/9/2019  No acute finding in the pelvis or right hip. No acute finding in the right knee as well.      Physical Examination: General appearance:  alert, cooperative and no distress  Mental Status:  oriented to person, place and time and normal affect  Lungs:  clear to auscultation bilaterally, normal effort  Heart:  regular rate and rhythm, no murmur  Abdomen:  soft, nontender, nondistended, normal bowel sounds, no masses, hepatomegaly, splenomegaly  Extremities:  no edema, redness, tenderness in the calves, slight bruising noted to right knee  Skin:  no gross lesions, rashes, induration    Assessment:       Primary Problem  Syncope and collapse    Active Hospital Problems    Diagnosis Date Noted    Essential hypertension, benign [I10] 06/10/2019    Bradycardia [R00.1]     Hypokalemia [E87.6]     Syncope and collapse [R55] 06/09/2019    Other specified hypothyroidism [E03.8] 04/30/2019    [facility] [I48.0] 02/09/2018       Plan:     1. Discharge home today  2.  Discharge summary for details of plan    BRITTA Kennedy  6/11/2019  6:57 AM

## 2019-06-11 NOTE — CONSULTS
between. She was found to have a potassium level of 2.8 on arrival to the ER. She is known to Dr. Lisa Alaniz with a history of PAF and is actually set to undergo ablation with EP at [address] [place] [address] next month. She is currently sitting up in bed and watching TV. She is c/o being Jia Rubio. \" She states she has episodes where her body will just shake uncontrollably for minutes at a time. These episodes wax and wane and there are no identifiable factors that exacerbate or relieve the shaking. Otherwise, she is doing well at this time and has no further cardiac complaints or acute ROS issues needing to be addressed. Denies fever, fatigue, chills. Denies HA, LH, CVA/TIA like symptoms, dizziness, and syncope. Denies SOB, PIMENTEL, PND, and orthopnea. Denies CP, palpitations, and edema. Denies abdominal pain, abdominal bloating, nausea, vomiting, diarrhea, or constipation. Denies epistaxis, hemoptysis, hematemesis, hematuria, hematochezia, or melena. History: Allergies as of 06/09/2019 - Review Complete 06/09/2019   Allergen Reaction Noted    Aspartame and phenylalanine  01/14/2018    Penicillins Diarrhea 02/29/2016     Prior to Admission medications    Medication Sig Start Date End Date Taking?  Authorizing Provider   potassium chloride (KLOR-CON) 20 MEQ packet Take 20 mEq by mouth daily   Yes Historical Provider, MD   DULoxetine (CYMBALTA) 30 MG extended release capsule Take 30 mg by mouth daily   Yes Historical Provider, MD   flecainide (TAMBOCOR) 100 MG tablet Take 100 mg by mouth 2 times daily   Yes Historical Provider, MD   levothyroxine (SYNTHROID) 25 MCG tablet Take 75 mcg by mouth Daily    Yes Historical Provider, MD   dabigatran (PRADAXA) 150 MG capsule Take 150 mg by mouth 2 times daily   Yes Historical Provider, MD   hydrochlorothiazide (HYDRODIURIL) 25 MG tablet Take 25 mg by mouth daily   Yes Historical Provider, MD   metoprolol succinate (TOPROL XL) 25 MG extended release tablet Take 100 mg by mouth daily    Yes Historical Provider, MD   Topiramate (TOPAMAX PO) Take 100 mg by mouth 2 times daily    Yes Historical Provider, MD   albuterol sulfate HFA (PROVENTIL HFA) 108 (90 BASE) MCG/ACT inhaler Inhale 2 puffs into the lungs every 4 hours as needed for Wheezing or Shortness of Breath (Space out to every 6 hours as symptoms improve) Space out to every 6 hours as symptoms improve. 16   Shandabrigido SoloDO     She  has a past medical history of Atrial fibrillation ([address] Headache, Hypertension, Kidney mass, and Kidney stone. She  has a past surgical history that includes Tubal ligation (Bilateral) and cyst removal.    Patient Active Problem List   Diagnosis    Syncope and collapse    Anxiety    Depressive disorder    Essential hypertension, benign    H/O thyroid nodule    Hemorrhoids    Insomnia    Irregular heart beat    Renal mass    Microscopic hematuria    Mixed hyperlipidemia    Paroxysmal atrial fibrillation (HCC)    Other specified hypothyroidism    Bradycardia    Hypokalemia       She  reports that she has never smoked. She has never used smokeless tobacco. She reports that she drank alcohol. She reports that she does not use drugs. She indicated that her mother is alive. She indicated that her father is . She indicated that all of her three sisters are alive. She indicated that all of her four brothers are alive. Her family history includes Cancer in her sister; Diabetes type 2  in her mother; Hypertension in her mother; Other (age of onset: 27) in her father. Review of Systems:       As above in HPI, otherwise negative, and/or noncontributory. Physical Examination:     /87   Pulse 58   Temp 98.1 °F (36.7 °C) (Oral)   Resp 18   Ht 5' 8\" (1.727 m)   Wt 148 lb 5.9 oz (67.3 kg)   SpO2 97%   BMI 22.56 kg/m²     General: Awake, alert. Oriented. Cooperative. Well developed and well nourished. Head: Normocephalic. Atraumatic. Eyes: PERRL. No erythema.  No scleral icterus. Neck: Supple. Atraumatic. Trachea midline. Respiratory: Lung sounds clear. Regular rate. No distress noted with respirations. Cardiovascular: S1S2. Regular rhythm, bradycardic. No murmur noted. No edema. Carotids 2+ without bruit bilaterally. PPP. Gastrointestinal: Bowel sounds active. Non-tender, non-distended. Soft. Musculoskeletal: Strength equal bilaterally. Normal tone. Normal ROM throughout. Dermatological: Skin intact. No rash. No lesions. No jaundice. Hematological: No bruising or bleeding. Neurological: Oriented to person, place, and time. Cranial nerves II-XII grossly intact. Diagnostic Testing:       CBC:   Recent Labs     06/09/19  2059 06/10/19  0658   WBC 12.0* 12.4*   HGB 15.5 13.7   HCT 44.7 39.9   MCV 90.5 91.0    266      BMP:   Recent Labs     06/09/19  2059 06/10/19  0658    143   K 2.8* 3.2*    109*   CO2 22 23   BUN 20 15   CREATININE 1.13* 0.91*     Magnesium:   Recent Labs     06/09/19  2059 06/10/19  0658   MG 2.0 2.1     Troponin: Invalid input(s): TROP    Results for Aaron Eye (MRN [id number]) as of 6/11/2019 07:39   Ref. Range 6/9/2019 20:59 6/9/2019 23:23 6/10/2019 03:24 6/10/2019 06:58   Pro-BNP Latest Ref Range: <300 pg/mL  115     Troponin T Latest Ref Range: <0.03 ng/mL NOT REPORTED NOT REPORTED NOT REPORTED NOT REPORTED   Troponin Interp Unknown NOT REPORTED NOT REPORTED NOT REPORTED NOT REPORTED   Troponin, High Sensitivity Latest Ref Range: 0 - 14 ng/L 10 16 (H) 12 12   BNP Interpretation Unknown  Pro-BNP Reference. .. Orthostatic Vital Signs: Lying 151/87- Sitting 142/82- Standing 114/80    ECG/Telemetry reviewed- Appears sinus neha.        Cardiovascular Database:     PAF  1/2017 Stress ([facility]):       IMPRESSION:              *  Normal stress rest myocardial SPECT perfusion scan of the heart.       *  Low  probability for an acute ischemic event.        *  EF 61%  5/2019 Echo ([facility]):  · Normal left ventricular wall thickness. A contrast agent was used for   left ventricular opacification. The estimated left ventricular ejection   fraction is 50-55%    Patient case will be discussed with Dr. Walt De Anda and follow-up will be done by them. Thank you for the kind referral. If anything is needed in regards to patient care, please do not hesitate to call for further assistance. Thank you. BRITTA Roa-CNP      Office:  [phone]       ____________________________________________  [address] [facility]  [address] [facility] [address], [address] [place] [address]  Tel:  [phone]      Fax:  [phone]  [email or web address]

## 2019-06-11 NOTE — DISCHARGE SUMMARY
100 to 50 mg daily. Patient's potassium was replaced and her home dose was increased from 20 mEq bid to tid. In order to recheck a BMP on June 13 was provided    She was evaluated by cardiology, Dr Karan Small agreed with decreasing the metoprolol dose. They also suggest keeping the blood pressure in the 140-150 range. She needs to follow-up for ablation procedure. A Holter monitor or loop recorder may benefit her after the procedure. Patient has right knee pain. Elevation and ice encouraged. She was given 10 Norco for pain management. If she has continued problems an MRI can be considered as an outpatient.     Significant therapeutic interventions: Telemetry monitoring, IV fluids, decrease metoprolol dose, electrolyte replacement    Significant Diagnostic Studies:   Labs / Micro:  Recent Results (from the past 168 hour(s))   EKG 12 Lead    Collection Time: 06/09/19  8:46 PM   Result Value Ref Range    Ventricular Rate 59 BPM    Atrial Rate 59 BPM    P-R Interval 196 ms    QRS Duration 100 ms    Q-T Interval 460 ms    QTc Calculation (Bazett) 455 ms    P Axis 40 degrees    R Axis 59 degrees    T Axis 41 degrees   Troponin    Collection Time: 06/09/19  8:59 PM   Result Value Ref Range    Troponin, High Sensitivity 10 0 - 14 ng/L    Troponin T NOT REPORTED <0.03 ng/mL    Troponin Interp NOT REPORTED    Comprehensive Metabolic Panel    Collection Time: 06/09/19  8:59 PM   Result Value Ref Range    Glucose 183 (H) 70 - 99 mg/dL    BUN 20 8 - 23 mg/dL    CREATININE 1.13 (H) 0.50 - 0.90 mg/dL    Bun/Cre Ratio NOT REPORTED 9 - 20    Calcium 9.4 8.6 - 10.4 mg/dL    Sodium 136 135 - 144 mmol/L    Potassium 2.8 (LL) 3.7 - 5.3 mmol/L    Chloride 101 98 - 107 mmol/L    CO2 22 20 - 31 mmol/L    Anion Gap 13 9 - 17 mmol/L    Alkaline Phosphatase 75 35 - 104 U/L    ALT 15 5 - 33 U/L    AST 16 <32 U/L    Total Bilirubin 0.36 0.3 - 1.2 mg/dL    Total Protein 7.1 6.4 - 8.3 g/dL    Alb 3.9 3.5 - 5.2 g/dL    Albumin/Globulin Ratio 1.2 1.0 - 2.5    GFR Non- 49 (L) >60 mL/min    GFR African American 60 (L) >60 mL/min    GFR Comment          GFR Staging NOT REPORTED    CBC Auto Differential    Collection Time: 06/09/19  8:59 PM   Result Value Ref Range    WBC 12.0 (H) 3.5 - 11.0 k/uL    RBC 4.95 4.0 - 5.2 m/uL    Hemoglobin 15.5 12.0 - 16.0 g/dL    Hematocrit 44.7 36 - 46 %    MCV 90.5 80 - 100 fL    MCH 31.3 26 - 34 pg    MCHC 34.6 31 - 37 g/dL    RDW 13.7 12.5 - 15.4 %    Platelets 154 211 - 931 k/uL    MPV 8.5 6.0 - 12.0 fL    NRBC Automated NOT REPORTED per 100 WBC    Differential Type NOT REPORTED     Seg Neutrophils 76 (H) 36 - 66 %    Lymphocytes 17 (L) 24 - 44 %    Monocytes 5 2 - 11 %    Eosinophils % 1 1 - 4 %    Basophils 1 0 - 2 %    Immature Granulocytes NOT REPORTED 0 %    Segs Absolute 9.10 (H) 1.8 - 7.7 k/uL    Absolute Lymph # 2.10 1.0 - 4.8 k/uL    Absolute Mono # 0.60 0.1 - 1.2 k/uL    Absolute Eos # 0.20 0.0 - 0.4 k/uL    Basophils # 0.10 0.0 - 0.2 k/uL    Absolute Immature Granulocyte NOT REPORTED 0.00 - 0.30 k/uL    WBC Morphology NOT REPORTED     RBC Morphology NOT REPORTED     Platelet Estimate NOT REPORTED    Magnesium    Collection Time: 06/09/19  8:59 PM   Result Value Ref Range    Magnesium 2.0 1.6 - 2.6 mg/dL   Protime-INR    Collection Time: 06/09/19  8:59 PM   Result Value Ref Range    Protime 11.4 9.4 - 12.6 sec    INR 1.1    APTT    Collection Time: 06/09/19  8:59 PM   Result Value Ref Range    PTT 37.5 (H) 21.3 - 31.3 sec   Troponin    Collection Time: 06/09/19 11:23 PM   Result Value Ref Range    Troponin, High Sensitivity 16 (H) 0 - 14 ng/L    Troponin T NOT REPORTED <0.03 ng/mL    Troponin Interp NOT REPORTED    Brain Natriuretic Peptide    Collection Time: 06/09/19 11:23 PM   Result Value Ref Range    Pro- <300 pg/mL    BNP Interpretation Pro-BNP Reference Range:    EKG 12 Lead    Collection Time: 06/10/19 12:56 AM   Result Value Ref Range    Ventricular Rate 53 BPM    Atrial Rate 53 BPM    P-R Interval 210 ms    QRS Duration 96 ms    Q-T Interval 478 ms    QTc Calculation (Bazett) 448 ms    P Axis 51 degrees    R Axis 36 degrees    T Axis 66 degrees   Troponin    Collection Time: 06/10/19  3:24 AM   Result Value Ref Range    Troponin, High Sensitivity 12 0 - 14 ng/L    Troponin T NOT REPORTED <0.03 ng/mL    Troponin Interp NOT REPORTED    Basic Metabolic Panel w/ Reflex to MG    Collection Time: 06/10/19  6:58 AM   Result Value Ref Range    Glucose 109 (H) 70 - 99 mg/dL    BUN 15 8 - 23 mg/dL    CREATININE 0.91 (H) 0.50 - 0.90 mg/dL    Bun/Cre Ratio NOT REPORTED 9 - 20    Calcium 8.4 (L) 8.6 - 10.4 mg/dL    Sodium 143 135 - 144 mmol/L    Potassium 3.2 (L) 3.7 - 5.3 mmol/L    Chloride 109 (H) 98 - 107 mmol/L    CO2 23 20 - 31 mmol/L    Anion Gap 11 9 - 17 mmol/L    GFR Non-African American >60 >60 mL/min    GFR African American >60 >60 mL/min    GFR Comment          GFR Staging NOT REPORTED    Magnesium    Collection Time: 06/10/19  6:58 AM   Result Value Ref Range    Magnesium 2.1 1.6 - 2.6 mg/dL   CBC    Collection Time: 06/10/19  6:58 AM   Result Value Ref Range    WBC 12.4 (H) 3.5 - 11.0 k/uL    RBC 4.39 4.0 - 5.2 m/uL    Hemoglobin 13.7 12.0 - 16.0 g/dL    Hematocrit 39.9 36 - 46 %    MCV 91.0 80 - 100 fL    MCH 31.3 26 - 34 pg    MCHC 34.4 31 - 37 g/dL    RDW 13.6 12.5 - 15.4 %    Platelets 383 467 - 393 k/uL    MPV 8.3 6.0 - 12.0 fL    NRBC Automated NOT REPORTED per 100 WBC   Troponin    Collection Time: 06/10/19  6:58 AM   Result Value Ref Range    Troponin, High Sensitivity 12 0 - 14 ng/L    Troponin T NOT REPORTED <0.03 ng/mL    Troponin Interp NOT REPORTED    Basic Metabolic Panel w/ Reflex to MG    Collection Time: 06/11/19  7:51 AM   Result Value Ref Range    Glucose 98 70 - 99 mg/dL    BUN 9 8 - 23 mg/dL    CREATININE 0.75 0.50 - 0.90 mg/dL    Bun/Cre Ratio NOT REPORTED 9 - 20    Calcium 8.7 8.6 - 10.4 mg/dL    Sodium 145 (H) 135 - 144 mmol/L    Potassium 4.0 3.7 - 5.3 mmol/L    Chloride 111 (H) 98 - 107 mmol/L    CO2 25 20 - 31 mmol/L    Anion Gap 9 9 - 17 mmol/L    GFR Non-African American >60 >60 mL/min    GFR African American >60 >60 mL/min    GFR Comment          GFR Staging NOT REPORTED      Radiology:    Xr Chest Standard (2 Vw)  Result Date: 6/9/2019  No significant findings in the chest.         Xr Thoracic Spine (3 Views)  Result Date: 6/9/2019  No significant findings in the thoracic spine. Xr Lumbar Spine (2-3 Views)  Result Date: 6/9/2019  Degenerative disc disease and facet hypertrophy most pronounced in the lower lumbar spine without acute abnormality. Xr Knee Right (3 Views)  Result Date: 6/9/2019  No acute finding in the pelvis or right hip. No acute finding in the right knee as well. Ct Head Wo Contrast  Result Date: 6/9/2019  No acute intracranial abnormality. Ct Cervical Spine Wo Contrast  Result Date: 6/9/2019  No acute abnormality of the cervical spine. Xr Hip 2-3 Vw W Pelvis Right  Result Date: 6/9/2019  No acute finding in the pelvis or right hip. No acute finding in the right knee as well. Consultations:    Consults:     Final Specialist Recommendations/Findings:   IP CONSULT TO SOCIAL WORK  IP CONSULT TO CARDIOLOGY      The patient was seen and examined on day of discharge and this discharge summary is in conjunction with any daily progress note from day of discharge. Discharge plan:     Disposition: Home    Physician Follow Up:     José Delgado DO  [address]  [phone]    On 6/17/2019  Hospital follow up at 3:00 P. M. Dianna Mc MD  [address] [place] [address] [phone]    On 6/25/2019  Hospital follow up with Germaine Stephenson NP at 10:25 A. M.        Requiring Further Evaluation/Follow Up POST HOSPITALIZATION/Incidental Findings: Hypokalemia, BMP June 13    Diet: regular diet    Activity: As tolerated    Instructions to Patient: Take your medications as directed and follow-up with your primary health provider    Discharge Medications:      Medication List      START taking these medications    HYDROcodone-acetaminophen 5-325 MG per tablet  Commonly known as:  [place]ia [place]  Take 1 tablet by mouth every 4 hours as needed for Pain for up to 7 days. CHANGE how you take these medications    metoprolol succinate 50 MG extended release tablet  Commonly known as:  TOPROL XL  Take 1 tablet by mouth daily  Start taking on:  6/12/2019  What changed:    · medication strength  · how much to take     potassium chloride 20 MEQ packet  Commonly known as:  KLOR-CON  Take 20 mEq by mouth 3 times daily  What changed:  when to take this        CONTINUE taking these medications    albuterol sulfate  (90 Base) MCG/ACT inhaler  Commonly known as:  PROVENTIL HFA  Inhale 2 puffs into the lungs every 4 hours as needed for Wheezing or Shortness of Breath (Space out to every 6 hours as symptoms improve) Space out to every 6 hours as symptoms improve.      DULoxetine 30 MG extended release capsule  Commonly known as:  CYMBALTA     flecainide 100 MG tablet  Commonly known as:  TAMBOCOR     hydrochlorothiazide 25 MG tablet  Commonly known as:  HYDRODIURIL     levothyroxine 25 MCG tablet  Commonly known as:  SYNTHROID     PRADAXA 150 MG capsule  Generic drug:  dabigatran     TOPAMAX PO           Where to Get Your Medications      These medications were sent to [place] [address] [place] [facility] [place] [address] - P [phone] - F [phone]  [address] [place] [address]    Phone:  [phone]   · metoprolol succinate 50 MG extended release tablet  · potassium chloride 20 MEQ packet     You can get these medications from any pharmacy    Bring a paper prescription for each of these medications  · HYDROcodone-acetaminophen 5-325 MG per tablet         Time Spent on discharge is  30min  in patient examination, evaluation, counseling as well as medication reconciliation, prescriptions for required medications, discharge plan and follow up. Electronically signed by   BRITTA Méndez  6/11/2019  7:27 PM      Thank you Dr. Gail Woods DO for the opportunity to be involved in this patient's care.

## 2019-06-11 NOTE — PROGRESS NOTES
Discharge orders placed. Notified patient. Patient requesting to stay for lunch. Will discharge after.

## 2019-06-11 NOTE — PROGRESS NOTES
Spoke with Dr. Deonte Cuevas for cardiology consult  Informed of bradycardia, afib history, syncope and fall, holding [facility]  Cardiology will round on patient tomorrow, call through the   Night if needed.

## 2019-06-11 NOTE — PLAN OF CARE
Problem: Falls - Risk of:  Goal: Will remain free from falls  Description  Will remain free from falls  Outcome: Ongoing   Pt remains free from falls. Pt bed locked in the lowest position, fall sign posted, and non skid socks are on. Pt continues to call for assistance when needed and demonstrates proper use of cane. Will continue to monitor. Problem: Pain:  Goal: Pain level will decrease  Description  Pain level will decrease  Outcome: Ongoing  Pt pain assessed using 0-10 pain rating scale. Pt medicated with PRN pain meds and pt satisfied with result. Will continue to monitor.

## 2019-06-11 NOTE — PROGRESS NOTES
Independent  Scooting: Independent  Transfers  Sit to Stand: Modified independent  Stand to sit: Modified independent  Bed to Chair: Modified independent  Comment: use of straight cane  Ambulation  Ambulation?: Yes  More Ambulation?: No  Ambulation 1  Surface: level tile  Device: Single point cane  Assistance: Stand by assistance  Quality of Gait: mild R LE antalgia, slightly decreased step length and gait speed, cues for use of cane in left hand secondary to right knee antalgia although pt amb first walk with it in (R) hand and second walk with it in (L) hand. Distance: 150' x 2  Stairs/Curb  Stairs?: Yes  Stairs  # Steps : 6  Stairs Height: 6\"  Rails: Left ascending  Device: No Device  Assistance: Stand by assistance  Comment: cues for sequencing for step at a time pattern; pt needed re-education from previous session.      Balance  Posture: Good  Sitting - Static: Good  Sitting - Dynamic: Good  Standing - Static: Good;-  Standing - Dynamic: Good;-  Exercises  Hip Flexion: 15x  Hip Abduction: 15x  Knee Long Arc Quad: 15x  Ankle Pumps: 15x                        G-Code     OutComes Score                                                     AM-PAC Score             Goals  Short term goals  Time Frame for Short term goals: 14 visits  Short term goal 1: Pt to ambulate 350ft modified independent with rolling walker versus single point cane to allow for return to prior functional level  Short term goal 2: Pt to sit <> stand transfer independently to allow for return to prior functional level  Short term goal 3: Pt to tolerate 30 minutes of therapy for endurance-goal  met  Short term goal 4: Pt to ascend/descend three steps with bilateral rails modified independently to allow for safe entrance/exit into her home  Short term goal 5: Pt to demonstrate good to good - standing balance to decrease risk of falls  Patient Goals   Patient goals : Pt would like to get back to walking in the park    Plan    Plan  Times per week: 5x  Times per day: Daily  Current Treatment Recommendations: Strengthening, Balance Training, Functional Mobility Training, Transfer Training, Gait Training, Stair training, Endurance Training, Home Exercise Program, Patient/Caregiver Education & Training, ROM  Safety Devices  Type of devices: Gait belt, Call light within reach, Left in chair  Restraints  Initially in place: No     Therapy Time   Individual Concurrent Group Co-treatment   Time In 1000         Time Out 1032         Minutes 32         Timed Code Treatment Minutes: [facility], PT

## 2019-06-11 NOTE — PROGRESS NOTES
that includes Tubal ligation (Bilateral) and cyst removal.    Restrictions  Restrictions/Precautions  Restrictions/Precautions: Fall Risk  Required Braces or Orthoses?: No  Position Activity Restriction  Other position/activity restrictions: up with assistance, negative imaging for spine and R hip/knee  Subjective   General  Chart Reviewed: Yes, History and Physical, Progress Notes  Patient assessed for rehabilitation services?: Yes  Family / Caregiver Present: No  Subjective  Subjective: Patient reporting feeling shaky this AM \"that's not a new thing\"   General Comment  Comments: RN ok'd therapy treatment session this AM. Patient requesting to take a shower. RN disconnected IV and IV covered for shower. Pain Assessment  Pain Assessment: 0-10  Pain Level: 3  Pain Type: Acute pain  Pain Location: Knee  Pain Orientation: Right  Pain Descriptors: Sore;Jabbing  Vital Signs  Patient Currently in Pain: Yes   Orientation  Orientation  Overall Orientation Status: Within Functional Limits  Objective    ADL  Feeding: Independent  Grooming: Supervision(wash/dry hands/face in shower, seated on shower chair)  UE Bathing: Supervision(seated on shower chair)  LE Bathing: Supervision(seated on shower chair)  UE Dressing: Supervision(doffing hospital gown prior to shower, donning hospital gown post shower)  LE Dressing: Supervision(doffing pants/underwear prior to shower in standing, donning underwear/pants post shower while seated EOB)  Toileting: Modified independent (reports getting up and using toilet without assistance)  Additional Comments: Patient currently limited by intermittent dizziness/shakiness, overall decreased endurance/strength, R knee pain from recent fall. Patient uses cane for functional ambulation throughout ADL routine. Patient currently requires supervision for ADL tasks for safety considerations.  Patient educated on safety techniques throughout mobility and self care, energy conservation/work simplification techniques throughout mobility and self care. Spoke with patient regarding shower chair for shower at home, patient verbalizes understanding. Balance  Sitting Balance: Supervision(seated EOB while RN disconnected IV, seated on shower chair during shower)  Standing Balance: Supervision  Standing Balance  Time: ~5 minutes total   Activity: functional mobility during self care routine, standing during self care tasks  Comment: supervision, use of cane during ambulation  Functional Mobility  Functional - Mobility Device: Cane  Activity: To/from bathroom  Assist Level: Supervision  Functional Mobility Comments: no c/o dizziness throughout mobility/ADLs   Shower Transfers  Shower - Transfer From: [employer] - Transfer Type: To and From  Shower - Transfer To: Shower seat with back  Shower - Technique: Ambulating  Shower Transfers: Supervision  Shower Transfers Comments: Good use of cane throughout mobility, Good safety awareness.    Bed mobility  Supine to Sit: Modified independent  Sit to Supine: Modified independent  Comment: HOB elevated, HR used, no c/o dizziness   Transfers  Sit to stand: Supervision  Stand to sit: Supervision                       Cognition  Overall Cognitive Status: [facility]                                         Plan   Plan  Times per week: 4-5x/wk  Current Treatment Recommendations: Balance Training, Functional Mobility Training, Endurance Training, Safety Education & Training, Patient/Caregiver Education & Training, Equipment Evaluation, Education, & procurement, Self-Care / ADL, Home Management Training    Goals  Short term goals  Time Frame for Short term goals: 14 visits  Short term goal 1: perform functional transfers/functional mobility with mod IND using least restrictive AD  Short term goal 2: perform ADL tasks with mod IND using adaptive equipment/durable medical equipment as needed  Short term goal 3: independently demo safety awareness during ADLs and functional transfers/functional mobility  Short term goal 4: demo 10+ minutes standing tolerance with use of least restrictive AD for increased participation in ADLs       Therapy Time   Individual Concurrent Group Co-treatment   Time In [facility]         Time Out 0909         Minutes 34         Timed Code Treatment Minutes: [address], OT

## 2019-06-12 NOTE — CARE COORDINATION
Discharge Report    [facility])  Clinical Case Management Department  Written by: RODOLFO Guerrier    Patient Name: Herbert Preciado  Attending Provider: No att. providers found  Admit Date: 2019  8:14 PM  MRN: [id number]  Account: [de-identified]                     : 1960  Discharge Date: 2019      Disposition: home    Brenda Yang [place]

## 2019-06-14 ENCOUNTER — HOSPITAL ENCOUNTER (OUTPATIENT)
Age: 59
Discharge: HOME | End: 2019-06-14
Payer: MEDICARE

## 2019-06-14 DIAGNOSIS — I48.0 PAROXYSMAL ATRIAL FIBRILLATION (HCC): ICD-10-CM

## 2019-06-14 DIAGNOSIS — E87.6 HYPOKALEMIA: ICD-10-CM

## 2019-06-14 LAB
ANION GAP SERPL CALCULATED.3IONS-SCNC: 13 MMOL/L (ref 9–17)
BUN BLDV-MCNC: 16 MG/DL (ref 8–23)
BUN/CREAT BLD: ABNORMAL (ref 9–20)
CALCIUM SERPL-MCNC: 9.5 MG/DL (ref 8.6–10.4)
CHLORIDE BLD-SCNC: 110 MMOL/L (ref 98–107)
CO2: 23 MMOL/L (ref 20–31)
CREAT SERPL-MCNC: 0.73 MG/DL (ref 0.5–0.9)
GFR AFRICAN AMERICAN: >60 ML/MIN
GFR NON-AFRICAN AMERICAN: >60 ML/MIN
GFR SERPL CREATININE-BSD FRML MDRD: ABNORMAL ML/MIN/{1.73_M2}
GFR SERPL CREATININE-BSD FRML MDRD: ABNORMAL ML/MIN/{1.73_M2}
GLUCOSE BLD-MCNC: 99 MG/DL (ref 70–99)
POTASSIUM SERPL-SCNC: 4.5 MMOL/L (ref 3.7–5.3)
SODIUM BLD-SCNC: 146 MMOL/L (ref 135–144)

## 2019-06-14 PROCEDURE — 80048 BASIC METABOLIC PNL TOTAL CA: CPT

## 2019-06-14 PROCEDURE — 36415 COLL VENOUS BLD VENIPUNCTURE: CPT

## 2019-07-08 RX ORDER — POTASSIUM CHLORIDE 20 MEQ/1
20 TABLET, EXTENDED RELEASE ORAL 3 TIMES DAILY
Qty: 90 TABLET | Refills: 1 | Status: SHIPPED | OUTPATIENT
Start: 2019-07-08

## 2019-08-29 ENCOUNTER — APPOINTMENT (OUTPATIENT)
Dept: GENERAL RADIOLOGY | Age: 59
DRG: 207 | End: 2019-08-29
Payer: MEDICARE

## 2019-08-29 ENCOUNTER — HOSPITAL ENCOUNTER (INPATIENT)
Age: 59
LOS: 1 days | Discharge: HOME | DRG: 207 | End: 2019-08-29
Attending: SPECIALIST | Admitting: INTERNAL MEDICINE
Payer: MEDICARE

## 2019-08-29 ENCOUNTER — APPOINTMENT (OUTPATIENT)
Dept: CT IMAGING | Age: 59
DRG: 207 | End: 2019-08-29
Payer: MEDICARE

## 2019-08-29 VITALS
RESPIRATION RATE: 18 BRPM | TEMPERATURE: 97.8 F | SYSTOLIC BLOOD PRESSURE: 121 MMHG | HEART RATE: 65 BPM | BODY MASS INDEX: 25.61 KG/M2 | HEIGHT: 68 IN | WEIGHT: 169 LBS | DIASTOLIC BLOOD PRESSURE: 79 MMHG | OXYGEN SATURATION: 96 %

## 2019-08-29 DIAGNOSIS — R00.2 PALPITATIONS: Primary | ICD-10-CM

## 2019-08-29 DIAGNOSIS — R77.8 ELEVATED TROPONIN LEVEL: ICD-10-CM

## 2019-08-29 PROBLEM — R79.89 ELEVATED TROPONIN: Status: ACTIVE | Noted: 2019-08-29

## 2019-08-29 PROBLEM — E03.9 HYPOTHYROIDISM: Status: ACTIVE | Noted: 2019-04-30

## 2019-08-29 PROBLEM — R79.89 ELEVATED PLASMA METANEPHRINES: Status: ACTIVE | Noted: 2019-07-02

## 2019-08-29 LAB
ABSOLUTE EOS #: 0.2 K/UL (ref 0–0.4)
ABSOLUTE IMMATURE GRANULOCYTE: ABNORMAL K/UL (ref 0–0.3)
ABSOLUTE LYMPH #: 4.6 K/UL (ref 1–4.8)
ABSOLUTE MONO #: 0.8 K/UL (ref 0.1–1.2)
ANION GAP SERPL CALCULATED.3IONS-SCNC: 14 MMOL/L (ref 9–17)
BASOPHILS # BLD: 1 % (ref 0–2)
BASOPHILS ABSOLUTE: 0.1 K/UL (ref 0–0.2)
BNP INTERPRETATION: NORMAL
BUN BLDV-MCNC: 16 MG/DL (ref 8–23)
BUN/CREAT BLD: ABNORMAL (ref 9–20)
CALCIUM SERPL-MCNC: 9.5 MG/DL (ref 8.6–10.4)
CHLORIDE BLD-SCNC: 106 MMOL/L (ref 98–107)
CO2: 22 MMOL/L (ref 20–31)
CREAT SERPL-MCNC: 0.75 MG/DL (ref 0.5–0.9)
D-DIMER QUANTITATIVE: 0.82 MG/L FEU
DIFFERENTIAL TYPE: ABNORMAL
EKG ATRIAL RATE: 70 BPM
EKG ATRIAL RATE: 72 BPM
EKG P AXIS: 68 DEGREES
EKG P AXIS: 74 DEGREES
EKG P-R INTERVAL: 158 MS
EKG P-R INTERVAL: 162 MS
EKG Q-T INTERVAL: 426 MS
EKG Q-T INTERVAL: 438 MS
EKG QRS DURATION: 86 MS
EKG QRS DURATION: 88 MS
EKG QTC CALCULATION (BAZETT): 460 MS
EKG QTC CALCULATION (BAZETT): 479 MS
EKG R AXIS: 36 DEGREES
EKG R AXIS: 37 DEGREES
EKG T AXIS: 16 DEGREES
EKG T AXIS: 23 DEGREES
EKG VENTRICULAR RATE: 70 BPM
EKG VENTRICULAR RATE: 72 BPM
EOSINOPHILS RELATIVE PERCENT: 2 % (ref 1–4)
GFR AFRICAN AMERICAN: >60 ML/MIN
GFR NON-AFRICAN AMERICAN: >60 ML/MIN
GFR SERPL CREATININE-BSD FRML MDRD: ABNORMAL ML/MIN/{1.73_M2}
GFR SERPL CREATININE-BSD FRML MDRD: ABNORMAL ML/MIN/{1.73_M2}
GLUCOSE BLD-MCNC: 118 MG/DL (ref 70–99)
HCT VFR BLD CALC: 46.3 % (ref 36–46)
HEMOGLOBIN: 15.7 G/DL (ref 12–16)
IMMATURE GRANULOCYTES: ABNORMAL %
LV EF: 55 %
LVEF MODALITY: NORMAL
LYMPHOCYTES # BLD: 41 % (ref 24–44)
MCH RBC QN AUTO: 31.1 PG (ref 26–34)
MCHC RBC AUTO-ENTMCNC: 33.9 G/DL (ref 31–37)
MCV RBC AUTO: 92 FL (ref 80–100)
MONOCYTES # BLD: 7 % (ref 2–11)
NRBC AUTOMATED: ABNORMAL PER 100 WBC
PDW BLD-RTO: 14.1 % (ref 12.5–15.4)
PLATELET # BLD: 328 K/UL (ref 140–450)
PLATELET ESTIMATE: ABNORMAL
PMV BLD AUTO: 8.4 FL (ref 6–12)
POTASSIUM SERPL-SCNC: 3.8 MMOL/L (ref 3.7–5.3)
PRO-BNP: 85 PG/ML
RBC # BLD: 5.03 M/UL (ref 4–5.2)
RBC # BLD: ABNORMAL 10*6/UL
SEG NEUTROPHILS: 49 % (ref 36–66)
SEGMENTED NEUTROPHILS ABSOLUTE COUNT: 5.4 K/UL (ref 1.8–7.7)
SODIUM BLD-SCNC: 142 MMOL/L (ref 135–144)
TROPONIN INTERP: ABNORMAL
TROPONIN T: ABNORMAL NG/ML
TROPONIN, HIGH SENSITIVITY: 64 NG/L (ref 0–14)
TROPONIN, HIGH SENSITIVITY: 67 NG/L (ref 0–14)
TROPONIN, HIGH SENSITIVITY: 77 NG/L (ref 0–14)
TROPONIN, HIGH SENSITIVITY: 77 NG/L (ref 0–14)
WBC # BLD: 11.1 K/UL (ref 3.5–11)
WBC # BLD: ABNORMAL 10*3/UL

## 2019-08-29 PROCEDURE — 93306 TTE W/DOPPLER COMPLETE: CPT

## 2019-08-29 PROCEDURE — 6360000004 HC RX CONTRAST MEDICATION: Performed by: SPECIALIST

## 2019-08-29 PROCEDURE — 6370000000 HC RX 637 (ALT 250 FOR IP): Performed by: NURSE PRACTITIONER

## 2019-08-29 PROCEDURE — G0378 HOSPITAL OBSERVATION PER HR: HCPCS

## 2019-08-29 PROCEDURE — 99222 1ST HOSP IP/OBS MODERATE 55: CPT | Performed by: INTERNAL MEDICINE

## 2019-08-29 PROCEDURE — 85025 COMPLETE CBC W/AUTO DIFF WBC: CPT

## 2019-08-29 PROCEDURE — 85379 FIBRIN DEGRADATION QUANT: CPT

## 2019-08-29 PROCEDURE — 99285 EMERGENCY DEPT VISIT HI MDM: CPT

## 2019-08-29 PROCEDURE — APPSS45 APP SPLIT SHARED TIME 31-45 MINUTES: Performed by: CLINICAL NURSE SPECIALIST

## 2019-08-29 PROCEDURE — 83880 ASSAY OF NATRIURETIC PEPTIDE: CPT

## 2019-08-29 PROCEDURE — 2580000003 HC RX 258: Performed by: SPECIALIST

## 2019-08-29 PROCEDURE — 80048 BASIC METABOLIC PNL TOTAL CA: CPT

## 2019-08-29 PROCEDURE — 1200000000 HC SEMI PRIVATE

## 2019-08-29 PROCEDURE — 36415 COLL VENOUS BLD VENIPUNCTURE: CPT

## 2019-08-29 PROCEDURE — 71260 CT THORAX DX C+: CPT

## 2019-08-29 PROCEDURE — 93005 ELECTROCARDIOGRAM TRACING: CPT | Performed by: SPECIALIST

## 2019-08-29 PROCEDURE — 84484 ASSAY OF TROPONIN QUANT: CPT

## 2019-08-29 RX ORDER — POTASSIUM CHLORIDE 7.45 MG/ML
10 INJECTION INTRAVENOUS PRN
Status: DISCONTINUED | OUTPATIENT
Start: 2019-08-29 | End: 2019-08-29 | Stop reason: HOSPADM

## 2019-08-29 RX ORDER — DABIGATRAN ETEXILATE 150 MG/1
150 CAPSULE, COATED PELLETS ORAL 2 TIMES DAILY
Status: DISCONTINUED | OUTPATIENT
Start: 2019-08-29 | End: 2019-08-29 | Stop reason: HOSPADM

## 2019-08-29 RX ORDER — METOPROLOL SUCCINATE 50 MG/1
50 TABLET, EXTENDED RELEASE ORAL DAILY
Status: DISCONTINUED | OUTPATIENT
Start: 2019-08-29 | End: 2019-08-29 | Stop reason: HOSPADM

## 2019-08-29 RX ORDER — NITROGLYCERIN 0.4 MG/1
0.4 TABLET SUBLINGUAL EVERY 5 MIN PRN
Status: DISCONTINUED | OUTPATIENT
Start: 2019-08-29 | End: 2019-08-29 | Stop reason: HOSPADM

## 2019-08-29 RX ORDER — TOPIRAMATE 100 MG/1
100 TABLET, FILM COATED ORAL 2 TIMES DAILY
Status: DISCONTINUED | OUTPATIENT
Start: 2019-08-29 | End: 2019-08-29 | Stop reason: HOSPADM

## 2019-08-29 RX ORDER — PANTOPRAZOLE SODIUM 40 MG/1
40 TABLET, DELAYED RELEASE ORAL
Qty: 30 TABLET | Refills: 0 | Status: SHIPPED | OUTPATIENT
Start: 2019-08-29 | End: 2020-07-10

## 2019-08-29 RX ORDER — POTASSIUM CHLORIDE 20 MEQ/1
40 TABLET, EXTENDED RELEASE ORAL PRN
Status: DISCONTINUED | OUTPATIENT
Start: 2019-08-29 | End: 2019-08-29 | Stop reason: HOSPADM

## 2019-08-29 RX ORDER — POTASSIUM CHLORIDE 20 MEQ/1
20 TABLET, EXTENDED RELEASE ORAL 3 TIMES DAILY
Status: DISCONTINUED | OUTPATIENT
Start: 2019-08-29 | End: 2019-08-29 | Stop reason: HOSPADM

## 2019-08-29 RX ORDER — SODIUM CHLORIDE 0.9 % (FLUSH) 0.9 %
10 SYRINGE (ML) INJECTION EVERY 12 HOURS SCHEDULED
Status: DISCONTINUED | OUTPATIENT
Start: 2019-08-29 | End: 2019-08-29 | Stop reason: HOSPADM

## 2019-08-29 RX ORDER — RAMIPRIL 2.5 MG/1
2.5 CAPSULE ORAL DAILY
COMMUNITY

## 2019-08-29 RX ORDER — ATORVASTATIN CALCIUM 40 MG/1
40 TABLET, FILM COATED ORAL NIGHTLY
Status: DISCONTINUED | OUTPATIENT
Start: 2019-08-29 | End: 2019-08-29 | Stop reason: HOSPADM

## 2019-08-29 RX ORDER — ACETAMINOPHEN 325 MG/1
650 TABLET ORAL EVERY 4 HOURS PRN
Status: DISCONTINUED | OUTPATIENT
Start: 2019-08-29 | End: 2019-08-29 | Stop reason: HOSPADM

## 2019-08-29 RX ORDER — MAGNESIUM SULFATE 1 G/100ML
1 INJECTION INTRAVENOUS PRN
Status: DISCONTINUED | OUTPATIENT
Start: 2019-08-29 | End: 2019-08-29 | Stop reason: HOSPADM

## 2019-08-29 RX ORDER — 0.9 % SODIUM CHLORIDE 0.9 %
80 INTRAVENOUS SOLUTION INTRAVENOUS ONCE
Status: COMPLETED | OUTPATIENT
Start: 2019-08-29 | End: 2019-08-29

## 2019-08-29 RX ORDER — SODIUM CHLORIDE 0.9 % (FLUSH) 0.9 %
10 SYRINGE (ML) INJECTION PRN
Status: DISCONTINUED | OUTPATIENT
Start: 2019-08-29 | End: 2019-08-29 | Stop reason: HOSPADM

## 2019-08-29 RX ORDER — RAMIPRIL 1.25 MG/1
2.5 CAPSULE ORAL DAILY
Status: DISCONTINUED | OUTPATIENT
Start: 2019-08-29 | End: 2019-08-29 | Stop reason: HOSPADM

## 2019-08-29 RX ORDER — HYDROCHLOROTHIAZIDE 25 MG/1
25 TABLET ORAL DAILY
Status: DISCONTINUED | OUTPATIENT
Start: 2019-08-29 | End: 2019-08-29 | Stop reason: HOSPADM

## 2019-08-29 RX ORDER — ONDANSETRON 2 MG/ML
4 INJECTION INTRAMUSCULAR; INTRAVENOUS EVERY 6 HOURS PRN
Status: DISCONTINUED | OUTPATIENT
Start: 2019-08-29 | End: 2019-08-29 | Stop reason: HOSPADM

## 2019-08-29 RX ORDER — LEVOTHYROXINE SODIUM 0.07 MG/1
75 TABLET ORAL DAILY
Status: DISCONTINUED | OUTPATIENT
Start: 2019-08-29 | End: 2019-08-29 | Stop reason: HOSPADM

## 2019-08-29 RX ORDER — ALBUTEROL SULFATE 90 UG/1
2 AEROSOL, METERED RESPIRATORY (INHALATION) EVERY 4 HOURS PRN
Status: DISCONTINUED | OUTPATIENT
Start: 2019-08-29 | End: 2019-08-29 | Stop reason: HOSPADM

## 2019-08-29 RX ORDER — FLECAINIDE ACETATE 50 MG/1
100 TABLET ORAL 2 TIMES DAILY
Status: DISCONTINUED | OUTPATIENT
Start: 2019-08-29 | End: 2019-08-29 | Stop reason: HOSPADM

## 2019-08-29 RX ORDER — DULOXETIN HYDROCHLORIDE 30 MG/1
30 CAPSULE, DELAYED RELEASE ORAL DAILY
Status: DISCONTINUED | OUTPATIENT
Start: 2019-08-29 | End: 2019-08-29 | Stop reason: HOSPADM

## 2019-08-29 RX ADMIN — IOVERSOL 75 ML: 741 INJECTION INTRA-ARTERIAL; INTRAVENOUS at 03:19

## 2019-08-29 RX ADMIN — ASPIRIN 325 MG: 325 TABLET, COATED ORAL at 11:10

## 2019-08-29 RX ADMIN — METOPROLOL SUCCINATE 50 MG: 50 TABLET, EXTENDED RELEASE ORAL at 11:10

## 2019-08-29 RX ADMIN — DULOXETINE 30 MG: 30 CAPSULE, DELAYED RELEASE ORAL at 11:11

## 2019-08-29 RX ADMIN — HYDROCHLOROTHIAZIDE 25 MG: 25 TABLET ORAL at 11:10

## 2019-08-29 RX ADMIN — TOPIRAMATE 100 MG: 100 TABLET, FILM COATED ORAL at 11:10

## 2019-08-29 RX ADMIN — Medication 10 ML: at 03:32

## 2019-08-29 RX ADMIN — LEVOTHYROXINE SODIUM 75 MCG: 75 TABLET ORAL at 11:10

## 2019-08-29 RX ADMIN — DABIGATRAN ETEXILATE MESYLATE 150 MG: 150 CAPSULE ORAL at 11:10

## 2019-08-29 RX ADMIN — FLECAINIDE ACETATE 100 MG: 50 TABLET ORAL at 11:10

## 2019-08-29 RX ADMIN — SODIUM CHLORIDE 80 ML: 9 INJECTION, SOLUTION INTRAVENOUS at 03:30

## 2019-08-29 RX ADMIN — RAMIPRIL 2.5 MG: 1.25 CAPSULE ORAL at 11:10

## 2019-08-29 ASSESSMENT — ENCOUNTER SYMPTOMS
TROUBLE SWALLOWING: 0
RESPIRATORY NEGATIVE: 1
GASTROINTESTINAL NEGATIVE: 1
EYES NEGATIVE: 1
BACK PAIN: 0
CHEST TIGHTNESS: 0
SHORTNESS OF BREATH: 1
NAUSEA: 0
ABDOMINAL PAIN: 0
COUGH: 0
SORE THROAT: 1

## 2019-08-29 ASSESSMENT — PAIN SCALES - GENERAL: PAINLEVEL_OUTOF10: 0

## 2019-08-29 NOTE — ED PROVIDER NOTES
are visualized and the radiologist interpretations are reviewed as follows:     Ct Chest Pulmonary Embolism W Contrast    Result Date: 8/29/2019  EXAMINATION: CTA OF THE CHEST 8/29/2019 3:07 am TECHNIQUE: CTA of the chest was performed after the administration of intravenous contrast.  Multiplanar reformatted images are provided for review. MIP images are provided for review. Dose modulation, iterative reconstruction, and/or weight based adjustment of the mA/kV was utilized to reduce the radiation dose to as low as reasonably achievable. COMPARISON: None. HISTORY: ORDERING SYSTEM PROVIDED HISTORY: Rule out PE TECHNOLOGIST PROVIDED HISTORY: Reason for Exam: chest pain Acuity: Acute Type of Exam: Initial FINDINGS: Pulmonary Arteries: Pulmonary arteries are adequately opacified for evaluation. No evidence of intraluminal filling defect to suggest pulmonary embolism. Main pulmonary artery is normal in caliber. Mediastinum: No evidence of mediastinal lymphadenopathy. The heart and pericardium demonstrate no acute abnormality. There is no acute abnormality of the thoracic aorta. Lungs/pleura: The lungs are without acute process. There are mild linear scar-like opacities in the lung bases. No focal consolidation or pulmonary edema. No evidence of pleural effusion or pneumothorax. Upper Abdomen: Limited images of the upper abdomen are unremarkable. Soft Tissues/Bones: No acute bone or soft tissue abnormality. No evidence of pulmonary embolism or acute pulmonary abnormality.      LABS:  Results for orders placed or performed during the hospital encounter of 08/29/19   CBC Auto Differential   Result Value Ref Range    WBC 11.1 (H) 3.5 - 11.0 k/uL    RBC 5.03 4.0 - 5.2 m/uL    Hemoglobin 15.7 12.0 - 16.0 g/dL    Hematocrit 46.3 (H) 36 - 46 %    MCV 92.0 80 - 100 fL    MCH 31.1 26 - 34 pg    MCHC 33.9 31 - 37 g/dL    RDW 14.1 12.5 - 15.4 %    Platelets 989 177 - 544 k/uL    MPV 8.4 6.0 - 12.0 fL    NRBC Automated NOT REPORTED per 100 WBC    Differential Type NOT REPORTED     Seg Neutrophils 49 36 - 66 %    Lymphocytes 41 24 - 44 %    Monocytes 7 2 - 11 %    Eosinophils % 2 1 - 4 %    Basophils 1 0 - 2 %    Immature Granulocytes NOT REPORTED 0 %    Segs Absolute 5.40 1.8 - 7.7 k/uL    Absolute Lymph # 4.60 1.0 - 4.8 k/uL    Absolute Mono # 0.80 0.1 - 1.2 k/uL    Absolute Eos # 0.20 0.0 - 0.4 k/uL    Basophils Absolute 0.10 0.0 - 0.2 k/uL    Absolute Immature Granulocyte NOT REPORTED 0.00 - 0.30 k/uL    WBC Morphology NOT REPORTED     RBC Morphology NOT REPORTED     Platelet Estimate NOT REPORTED    Basic Metabolic Panel w/ Reflex to MG   Result Value Ref Range    Glucose 118 (H) 70 - 99 mg/dL    BUN 16 8 - 23 mg/dL    CREATININE 0.75 0.50 - 0.90 mg/dL    Bun/Cre Ratio NOT REPORTED 9 - 20    Calcium 9.5 8.6 - 10.4 mg/dL    Sodium 142 135 - 144 mmol/L    Potassium 3.8 3.7 - 5.3 mmol/L    Chloride 106 98 - 107 mmol/L    CO2 22 20 - 31 mmol/L    Anion Gap 14 9 - 17 mmol/L    GFR Non-African American >60 >60 mL/min    GFR African American >60 >60 mL/min    GFR Comment          GFR Staging NOT REPORTED    Troponin   Result Value Ref Range    Troponin, High Sensitivity 77 (HH) 0 - 14 ng/L    Troponin T NOT REPORTED <0.03 ng/mL    Troponin Interp NOT REPORTED    D-Dimer, Quantitative   Result Value Ref Range    D-Dimer, Quant 0.82 mg/L FEU   Brain Natriuretic Peptide   Result Value Ref Range    Pro-BNP 85 <300 pg/mL    BNP Interpretation Pro-BNP Reference Range:    Troponin   Result Value Ref Range    Troponin, High Sensitivity 77 (HH) 0 - 14 ng/L    Troponin T NOT REPORTED <0.03 ng/mL    Troponin Interp NOT REPORTED    Patient has elevated d-dimer and elevated troponin level. Repeat troponin is also elevated.       EMERGENCY DEPARTMENT COURSE:   Vitals:    Vitals:    08/29/19 0415 08/29/19 0430 08/29/19 0445 08/29/19 0500   BP: (!) 144/80 119/77 133/79 (!) 143/89   Pulse: 79 73 67 69   Resp: 18 19 19 18   Temp:       TempSrc:

## 2019-08-29 NOTE — DISCHARGE SUMMARY
16.0 g/dL    Hematocrit 46.3 (H) 36 - 46 %    MCV 92.0 80 - 100 fL    MCH 31.1 26 - 34 pg    MCHC 33.9 31 - 37 g/dL    RDW 14.1 12.5 - 15.4 %    Platelets 477 184 - 346 k/uL    MPV 8.4 6.0 - 12.0 fL    NRBC Automated NOT REPORTED per 100 WBC    Differential Type NOT REPORTED     Seg Neutrophils 49 36 - 66 %    Lymphocytes 41 24 - 44 %    Monocytes 7 2 - 11 %    Eosinophils % 2 1 - 4 %    Basophils 1 0 - 2 %    Immature Granulocytes NOT REPORTED 0 %    Segs Absolute 5.40 1.8 - 7.7 k/uL    Absolute Lymph # 4.60 1.0 - 4.8 k/uL    Absolute Mono # 0.80 0.1 - 1.2 k/uL    Absolute Eos # 0.20 0.0 - 0.4 k/uL    Basophils Absolute 0.10 0.0 - 0.2 k/uL    Absolute Immature Granulocyte NOT REPORTED 0.00 - 0.30 k/uL    WBC Morphology NOT REPORTED     RBC Morphology NOT REPORTED     Platelet Estimate NOT REPORTED    Basic Metabolic Panel w/ Reflex to MG    Collection Time: 08/29/19  2:20 AM   Result Value Ref Range    Glucose 118 (H) 70 - 99 mg/dL    BUN 16 8 - 23 mg/dL    CREATININE 0.75 0.50 - 0.90 mg/dL    Bun/Cre Ratio NOT REPORTED 9 - 20    Calcium 9.5 8.6 - 10.4 mg/dL    Sodium 142 135 - 144 mmol/L    Potassium 3.8 3.7 - 5.3 mmol/L    Chloride 106 98 - 107 mmol/L    CO2 22 20 - 31 mmol/L    Anion Gap 14 9 - 17 mmol/L    GFR Non-African American >60 >60 mL/min    GFR African American >60 >60 mL/min    GFR Comment          GFR Staging NOT REPORTED    Troponin    Collection Time: 08/29/19  2:20 AM   Result Value Ref Range    Troponin, High Sensitivity 77 (HH) 0 - 14 ng/L    Troponin T NOT REPORTED <0.03 ng/mL    Troponin Interp NOT REPORTED    D-Dimer, Quantitative    Collection Time: 08/29/19  2:20 AM   Result Value Ref Range    D-Dimer, Quant 0.82 mg/L FEU   Brain Natriuretic Peptide    Collection Time: 08/29/19  2:20 AM   Result Value Ref Range    Pro-BNP 85 <300 pg/mL    BNP Interpretation Pro-BNP Reference Range:    EKG 12 Lead    Collection Time: 08/29/19  2:42 AM   Result Value Ref Range    Ventricular Rate 70 BPM

## 2019-08-29 NOTE — H&P
41 24 - 44 %    Monocytes 7 2 - 11 %    Eosinophils % 2 1 - 4 %    Basophils 1 0 - 2 %    Immature Granulocytes NOT REPORTED 0 %    Segs Absolute 5.40 1.8 - 7.7 k/uL    Absolute Lymph # 4.60 1.0 - 4.8 k/uL    Absolute Mono # 0.80 0.1 - 1.2 k/uL    Absolute Eos # 0.20 0.0 - 0.4 k/uL    Basophils Absolute 0.10 0.0 - 0.2 k/uL    Absolute Immature Granulocyte NOT REPORTED 0.00 - 0.30 k/uL    WBC Morphology NOT REPORTED     RBC Morphology NOT REPORTED     Platelet Estimate NOT REPORTED    Basic Metabolic Panel w/ Reflex to MG    Collection Time: 08/29/19  2:20 AM   Result Value Ref Range    Glucose 118 (H) 70 - 99 mg/dL    BUN 16 8 - 23 mg/dL    CREATININE 0.75 0.50 - 0.90 mg/dL    Bun/Cre Ratio NOT REPORTED 9 - 20    Calcium 9.5 8.6 - 10.4 mg/dL    Sodium 142 135 - 144 mmol/L    Potassium 3.8 3.7 - 5.3 mmol/L    Chloride 106 98 - 107 mmol/L    CO2 22 20 - 31 mmol/L    Anion Gap 14 9 - 17 mmol/L    GFR Non-African American >60 >60 mL/min    GFR African American >60 >60 mL/min    GFR Comment          GFR Staging NOT REPORTED    Troponin    Collection Time: 08/29/19  2:20 AM   Result Value Ref Range    Troponin, High Sensitivity 77 (HH) 0 - 14 ng/L    Troponin T NOT REPORTED <0.03 ng/mL    Troponin Interp NOT REPORTED    D-Dimer, Quantitative    Collection Time: 08/29/19  2:20 AM   Result Value Ref Range    D-Dimer, Quant 0.82 mg/L FEU   Brain Natriuretic Peptide    Collection Time: 08/29/19  2:20 AM   Result Value Ref Range    Pro-BNP 85 <300 pg/mL    BNP Interpretation Pro-BNP Reference Range:    EKG 12 Lead    Collection Time: 08/29/19  2:42 AM   Result Value Ref Range    Ventricular Rate 70 BPM    Atrial Rate 70 BPM    P-R Interval 158 ms    QRS Duration 86 ms    Q-T Interval 426 ms    QTc Calculation (Bazett) 460 ms    P Axis 74 degrees    R Axis 37 degrees    T Axis 23 degrees   Troponin    Collection Time: 08/29/19  4:20 AM   Result Value Ref Range    Troponin, High Sensitivity 77 (HH) 0 - 14 ng/L    Troponin T NOT

## 2019-09-28 PROBLEM — R77.8 ELEVATED TROPONIN: Status: RESOLVED | Noted: 2019-08-29 | Resolved: 2019-09-28

## 2019-09-28 PROBLEM — R79.89 ELEVATED TROPONIN: Status: RESOLVED | Noted: 2019-08-29 | Resolved: 2019-09-28

## 2020-01-28 ENCOUNTER — OFFICE VISIT (OUTPATIENT)
Dept: NEUROLOGY | Age: 60
End: 2020-01-28
Payer: MEDICARE

## 2020-01-28 ENCOUNTER — TELEPHONE (OUTPATIENT)
Dept: NEUROLOGY | Age: 60
End: 2020-01-28

## 2020-01-28 VITALS
WEIGHT: 175 LBS | SYSTOLIC BLOOD PRESSURE: 116 MMHG | HEIGHT: 68 IN | BODY MASS INDEX: 26.52 KG/M2 | DIASTOLIC BLOOD PRESSURE: 73 MMHG | HEART RATE: 68 BPM

## 2020-01-28 PROBLEM — R42 VERTIGO: Status: ACTIVE | Noted: 2020-01-28

## 2020-01-28 PROBLEM — G43.009 MIGRAINE WITHOUT AURA AND WITHOUT STATUS MIGRAINOSUS, NOT INTRACTABLE: Status: ACTIVE | Noted: 2020-01-28

## 2020-01-28 PROBLEM — F45.8 HYPERVENTILATION SYNDROME: Status: ACTIVE | Noted: 2020-01-28

## 2020-01-28 PROCEDURE — 99204 OFFICE O/P NEW MOD 45 MIN: CPT | Performed by: NURSE PRACTITIONER

## 2020-01-28 PROCEDURE — G8484 FLU IMMUNIZE NO ADMIN: HCPCS | Performed by: NURSE PRACTITIONER

## 2020-01-28 PROCEDURE — G8419 CALC BMI OUT NRM PARAM NOF/U: HCPCS | Performed by: NURSE PRACTITIONER

## 2020-01-28 PROCEDURE — G8427 DOCREV CUR MEDS BY ELIG CLIN: HCPCS | Performed by: NURSE PRACTITIONER

## 2020-01-28 PROCEDURE — 3017F COLORECTAL CA SCREEN DOC REV: CPT | Performed by: NURSE PRACTITIONER

## 2020-01-28 PROCEDURE — 1036F TOBACCO NON-USER: CPT | Performed by: NURSE PRACTITIONER

## 2020-01-28 RX ORDER — BUSPIRONE HYDROCHLORIDE 5 MG/1
5 TABLET ORAL 2 TIMES DAILY
Qty: 60 TABLET | Refills: 5 | Status: SHIPPED | OUTPATIENT
Start: 2020-01-28 | End: 2020-02-27

## 2020-01-28 NOTE — TELEPHONE ENCOUNTER
Tilt Table order and office notes faxed to [address] [facility], Kristen Coburn CNP, @ [phone].

## 2020-01-28 NOTE — PROGRESS NOTES
negative for torsional nystagmus, bilaterally, which contraindicates Benign Paroxysmal Positional Vertigo (BPPV). Positional testing revealed significant upbeating positional nystagmus in every position. Patient was symptomatic during Nura-Hallpike Left and Head Left postionals. Overall, a vertical nystagmus present during positional testing has been associated with damage to the cerebellum, Arnold-Chiari malformation, multiple sclerosis, vertebrobasilar insufficiency, and pharmacological influence. Monothermal caloric irrigations revealed robust and symmetrical responses with normal fixation suppression.        PAST MEDICAL HISTORY:         Diagnosis Date    Atrial fibrillation ([address]     Headache     migraine's since 2003    Hypertension     Kidney mass     Kidney stone         PAST SURGICAL HISTORY:         Procedure Laterality Date    CYST REMOVAL      On top of head    TUBAL LIGATION Bilateral     VENTRICULAR ABLATION SURGERY  08/2019        SOCIAL HISTORY:     Social History     Socioeconomic History    Marital status:      Spouse name: Not on file    Number of children: Not on file    Years of education: Not on file    Highest education level: Not on file   Occupational History    Not on file   Social Needs    Financial resource strain: Not on file    Food insecurity:     Worry: Not on file     Inability: Not on file    Transportation needs:     Medical: Not on file     Non-medical: Not on file   Tobacco Use    Smoking status: Never Smoker    Smokeless tobacco: Never Used   Substance and Sexual Activity    Alcohol use: Not Currently     Comment: rarely    Drug use: Yes     Types: Marijuana     Comment: internal tremor    Sexual activity: Yes     Partners: Male   Lifestyle    Physical activity:     Days per week: Not on file     Minutes per session: Not on file    Stress: Not on file   Relationships    Social connections:     Talks on phone: Not on file     Gets together: Not on absent, Leg swelling :absent      GI Constipation: absent, Diarrhea: absent, Swallowing change: absent       Urinary frequency: absent, Urinary urgency: absent,    MUSCULOSKELETAL Neck pain: present, Back pain: absent, Stiffness: present, Muscle pain: absent, Joint pain: absent Restless legs: present   DERMATOLOGIC Hair loss: absent, Skin changes: absent   NEUROLOGIC Memory loss: present, Confusion: present, Seizures: absent Trouble walking or imbalance: present, Dizziness: present, Weakness: present, Numbness: present Tremor: present, Spasm: absent, Speech difficulty: absent, Headache: present, Light sensitivity: present   PSYCHIATRIC Anxiety: present, Hallucination: absent, Mood disorder: present   HEMATOLOGIC Abnormal bleeding: absent, Anemia: absent,            PHYSICAL EXAMINATION:       Vitals:    01/28/20 1307   BP: 116/73   Pulse: 68                                              .                                                                                                    General Appearance:  Alert, cooperative, no signs of distress, appears stated age   Head:  Normocephalic, no signs of trauma   Eyes:  Conjunctiva/corneas clear;  eyelids intact   Ears:  Normal external ear and canals   Nose: Nares normal, mucosa normal, no drainage    Throat: Lips and tongue normal; teeth normal;  gums normal   Neck: Supple, intact flexion, extension and rotation;   trachea midline;  no adenopathy;   thyroid: not enlarged;   no carotid pulse abnormality   Back:   Symmetric, no curvature, ROM adequate   Lungs:   Respirations unlabored   Heart:  Regular rate and rhythm           Extremities: Extremities normal, no cyanosis, no edema   Pulses: Symmetric over head and neck   Skin: Skin color, texture normal, no rashes, no lesions                                     NEUROLOGIC EXAMINATION    Neurologic Exam    Mental status    Alert and oriented x 3; intact memory with no confusion, speech or language problems; no is contacted a therapist.  She just needs to follow through on scheduling  3. Episodes of lightheadedness which can be multifactorial.  This could be secondary to her atrial fibrillation, neurocardiogenic syncope, or panic attacks associated with her anxiety. 1. Obtain tilt table testing  2. Patient advised to drink 60 ounces of water per day including one 8 ounce Gatorade. Patient was advised to use compression stockings to her knees during all waking hours while she is out of bed  3. BuSpar 5 mg twice daily for treatment of her anxiety along with Celexa  4. Patient will return in 2 months for reevaluation following her vestibular rehabilitation and tilt table testing    Signed: Bessy Dye CNP  Please note that this chart was generated using voice recognition Dragon dictation software. Although every effort was made to ensure the accuracy of this automated transcription, some errors in transcription may have occurred.

## 2020-03-30 ENCOUNTER — TELEPHONE (OUTPATIENT)
Dept: NEUROLOGY | Age: 60
End: 2020-03-30

## 2020-07-10 ENCOUNTER — HOSPITAL ENCOUNTER (EMERGENCY)
Age: 60
Discharge: HOME | End: 2020-07-10
Attending: EMERGENCY MEDICINE
Payer: MEDICARE

## 2020-07-10 VITALS
HEART RATE: 63 BPM | BODY MASS INDEX: 27.28 KG/M2 | DIASTOLIC BLOOD PRESSURE: 85 MMHG | TEMPERATURE: 97.8 F | HEIGHT: 68 IN | SYSTOLIC BLOOD PRESSURE: 152 MMHG | WEIGHT: 180 LBS | RESPIRATION RATE: 15 BRPM | OXYGEN SATURATION: 97 %

## 2020-07-10 LAB
ABSOLUTE EOS #: 0.1 K/UL (ref 0–0.4)
ABSOLUTE IMMATURE GRANULOCYTE: NORMAL K/UL (ref 0–0.3)
ABSOLUTE LYMPH #: 3.1 K/UL (ref 1–4.8)
ABSOLUTE MONO #: 0.8 K/UL (ref 0.1–1.2)
ANION GAP SERPL CALCULATED.3IONS-SCNC: 14 MMOL/L (ref 9–17)
BASOPHILS # BLD: 1 % (ref 0–2)
BASOPHILS ABSOLUTE: 0.1 K/UL (ref 0–0.2)
BUN BLDV-MCNC: 14 MG/DL (ref 8–23)
BUN/CREAT BLD: ABNORMAL (ref 9–20)
CALCIUM SERPL-MCNC: 9.1 MG/DL (ref 8.6–10.4)
CHLORIDE BLD-SCNC: 103 MMOL/L (ref 98–107)
CO2: 24 MMOL/L (ref 20–31)
CREAT SERPL-MCNC: 0.78 MG/DL (ref 0.5–0.9)
DIFFERENTIAL TYPE: NORMAL
EOSINOPHILS RELATIVE PERCENT: 2 % (ref 1–4)
GFR AFRICAN AMERICAN: >60 ML/MIN
GFR NON-AFRICAN AMERICAN: >60 ML/MIN
GFR SERPL CREATININE-BSD FRML MDRD: ABNORMAL ML/MIN/{1.73_M2}
GFR SERPL CREATININE-BSD FRML MDRD: ABNORMAL ML/MIN/{1.73_M2}
GLUCOSE BLD-MCNC: 100 MG/DL (ref 70–99)
HCT VFR BLD CALC: 44.7 % (ref 36–46)
HEMOGLOBIN: 14.9 G/DL (ref 12–16)
IMMATURE GRANULOCYTES: NORMAL %
INR BLD: 1.1
LYMPHOCYTES # BLD: 32 % (ref 24–44)
MCH RBC QN AUTO: 30.6 PG (ref 26–34)
MCHC RBC AUTO-ENTMCNC: 33.4 G/DL (ref 31–37)
MCV RBC AUTO: 91.5 FL (ref 80–100)
MONOCYTES # BLD: 8 % (ref 2–11)
NRBC AUTOMATED: NORMAL PER 100 WBC
PARTIAL THROMBOPLASTIN TIME: 45 SEC (ref 21.3–31.3)
PDW BLD-RTO: 14 % (ref 12.5–15.4)
PLATELET # BLD: 308 K/UL (ref 140–450)
PLATELET ESTIMATE: NORMAL
PMV BLD AUTO: 8.6 FL (ref 6–12)
POTASSIUM SERPL-SCNC: 3.6 MMOL/L (ref 3.7–5.3)
PROTHROMBIN TIME: 11 SEC (ref 9.4–12.6)
RBC # BLD: 4.88 M/UL (ref 4–5.2)
RBC # BLD: NORMAL 10*6/UL
SEG NEUTROPHILS: 57 % (ref 36–66)
SEGMENTED NEUTROPHILS ABSOLUTE COUNT: 5.4 K/UL (ref 1.8–7.7)
SODIUM BLD-SCNC: 141 MMOL/L (ref 135–144)
TROPONIN INTERP: NORMAL
TROPONIN INTERP: NORMAL
TROPONIN T: NORMAL NG/ML
TROPONIN T: NORMAL NG/ML
TROPONIN, HIGH SENSITIVITY: <6 NG/L (ref 0–14)
TROPONIN, HIGH SENSITIVITY: <6 NG/L (ref 0–14)
WBC # BLD: 9.6 K/UL (ref 3.5–11)
WBC # BLD: NORMAL 10*3/UL

## 2020-07-10 PROCEDURE — 36415 COLL VENOUS BLD VENIPUNCTURE: CPT

## 2020-07-10 PROCEDURE — 93005 ELECTROCARDIOGRAM TRACING: CPT | Performed by: EMERGENCY MEDICINE

## 2020-07-10 PROCEDURE — 80048 BASIC METABOLIC PNL TOTAL CA: CPT

## 2020-07-10 PROCEDURE — 84484 ASSAY OF TROPONIN QUANT: CPT

## 2020-07-10 PROCEDURE — 85025 COMPLETE CBC W/AUTO DIFF WBC: CPT

## 2020-07-10 PROCEDURE — 99285 EMERGENCY DEPT VISIT HI MDM: CPT

## 2020-07-10 PROCEDURE — 85730 THROMBOPLASTIN TIME PARTIAL: CPT

## 2020-07-10 PROCEDURE — 85610 PROTHROMBIN TIME: CPT

## 2020-07-10 RX ORDER — BUSPIRONE HYDROCHLORIDE 10 MG/1
10 TABLET ORAL 2 TIMES DAILY
COMMUNITY
End: 2020-08-27

## 2020-07-10 ASSESSMENT — PAIN DESCRIPTION - LOCATION: LOCATION: CHEST

## 2020-07-10 ASSESSMENT — PAIN DESCRIPTION - ORIENTATION: ORIENTATION: MID

## 2020-07-10 NOTE — ED PROVIDER NOTES
[id number] [facility]  [address] [address] [address] [facility] [address]  Phone: [phone]  Fax: [phone]      Pt Name: Ana CHOWDARY:[id number]  Alvarogffrancesca 1960  Date of evaluation: 7/10/2020      CHIEF COMPLAINT       Chief Complaint   Patient presents with    Chest Pain     pt with history of atrial fibrillation, reports 2 episodes just 1 hour PTA that she felt \"off\" and \"out of body,\" pt denies syncope, but states she can hear people and not respond. pt reports a discomfort in her chest, not a strong pain, c/o more of fluttering    Palpitations       HISTORY OF PRESENT ILLNESS   70-year-old female presents to the emergency department today complaining of palpitations. She has a history of recurrent atrial fibrillation and she describes palpitations with associated shortness of breath. She does report that she felt \"out of it\" and felt dissociated from her body. She does not know if this is seizures or something else. She tells me that she has had an extensive work-up for this episode over the past year and a half. She denies any chest pain. No lower extremity edema or calf tenderness. No mitigating precipitating or exacerbating factors. REVIEW OF SYSTEMS     Review of Systems   All other systems reviewed and are negative. PAST MEDICAL HISTORY    has a past medical history of Atrial fibrillation ([address] Headache, Hypertension, Kidney mass, and Kidney stone. SURGICAL HISTORY      has a past surgical history that includes Tubal ligation (Bilateral); cyst removal; and Ventricular ablation surgery (08/2019). CURRENT MEDICATIONS       Previous Medications    ALBUTEROL SULFATE HFA (PROVENTIL HFA) 108 (90 BASE) MCG/ACT INHALER    Inhale 2 puffs into the lungs every 4 hours as needed for Wheezing or Shortness of Breath (Space out to every 6 hours as symptoms improve) Space out to every 6 hours as symptoms improve.     DABIGATRAN (PRADAXA) 150 MG CAPSULE    Take 150 mg by mouth 2 times daily    DULOXETINE (CYMBALTA) 30 MG EXTENDED RELEASE CAPSULE    Take 30 mg by mouth daily    LEVOTHYROXINE (SYNTHROID) 25 MCG TABLET    Take 75 mcg by mouth Daily     PANTOPRAZOLE (PROTONIX) 40 MG TABLET    Take 1 tablet by mouth every morning (before breakfast) Pt has at home do not fill! POTASSIUM CHLORIDE (KLOR-CON M) 20 MEQ EXTENDED RELEASE TABLET    Take 1 tablet by mouth 3 times daily    RAMIPRIL (ALTACE) 2.5 MG CAPSULE    Take 2.5 mg by mouth daily    TOPIRAMATE (TOPAMAX PO)    Take 100 mg by mouth 2 times daily        ALLERGIES     is allergic to aspartame and phenylalanine and penicillins. FAMILY HISTORY     She indicated that her mother is alive. She indicated that her father is . She indicated that all of her three sisters are alive. She indicated that all of her four brothers are alive. She indicated that the status of her maternal grandmother is unknown. She indicated that the status of her paternal grandfather is unknown.     family history includes Cancer in her maternal grandmother and sister; Diabetes type 2  in her mother; Heart Disease in her sister; Hypertension in her mother and paternal grandfather; Migraines in her sister; Other (age of onset: 27) in her father. SOCIAL HISTORY      reports that she has never smoked. She has never used smokeless tobacco. She reports previous alcohol use. She reports current drug use. Drug: Marijuana. PHYSICAL EXAM     INITIAL VITALS:  height is 5' 8\" (1.727 m) and weight is 81.6 kg (180 lb). Her temporal temperature is 97.8 °F (36.6 °C). Her blood pressure is 135/90 (abnormal) and her pulse is 74. Her respiration is 16 and oxygen saturation is 98%. Physical Exam  Vitals signs reviewed. Constitutional:       General: She is not in acute distress. Appearance: She is well-developed. HENT:      Head: Normocephalic and atraumatic.    Eyes:      Conjunctiva/sclera: Conjunctivae normal.      Pupils: Pupils are equal, round, and reactive to light. Neck:      Musculoskeletal: Normal range of motion and neck supple. Trachea: No tracheal deviation. Cardiovascular:      Rate and Rhythm: Normal rate. Rhythm irregular. Pulmonary:      Effort: No respiratory distress. Breath sounds: Normal breath sounds. Abdominal:      General: Bowel sounds are normal. There is no distension. Palpations: Abdomen is soft. Tenderness: There is no abdominal tenderness. Musculoskeletal: Normal range of motion. General: No tenderness. Skin:     General: Skin is warm and dry. Neurological:      Mental Status: She is alert and oriented to person, place, and time. Psychiatric:         Behavior: Behavior normal.         Thought Content: Thought content normal.         Judgment: Judgment normal.           DIFFERENTIAL DIAGNOSIS/ MDM:   I have initiated a cardiac work-up on this patient. DIAGNOSTIC RESULTS     EKG:  EKG is interpreted by myself showing atrial fibrillation with an occasional PVC. No acute ST segment changes. Rate axes and intervals are otherwise normal.    RADIOLOGY:   No results found. LABS:  No results found for this visit on 07/10/20. EMERGENCY DEPARTMENT COURSE:     Thepatient was given the following medications:  No orders of the defined types were placed in this encounter. Vitals:    Vitals:    07/10/20 1825   BP: (!) 135/90   Pulse: 74   Resp: 16   Temp: 97.8 °F (36.6 °C)   TempSrc: Temporal   SpO2: 98%   Weight: 81.6 kg (180 lb)   Height: 5' 8\" (1.727 m)     -------------------------  BP: (!) 135/90, Temp: 97.8 °F (36.6 °C), Pulse: 74, Resp: 16      Re-evaluation Notes  At 1900 hrs. I have endorsed this patient to my relieving physician. Please see their addendum.     CONSULTS:  None    CRITICAL CARE:   None    PROCEDURES:  None    (Please note that portions of this note were completed with a voice recognitionprogram.  Efforts were made to edit the dictations but occasionally words are mis-transcribed.)    Ivonne Mcgee MD, F.A.C.E.P, F.A.A.E.M  Emergency Physician Attending          Ivonne Mcgee MD  07/10/20 8330

## 2020-07-11 LAB
EKG ATRIAL RATE: 61 BPM
EKG Q-T INTERVAL: 418 MS
EKG QRS DURATION: 88 MS
EKG QTC CALCULATION (BAZETT): 427 MS
EKG R AXIS: 45 DEGREES
EKG T AXIS: 47 DEGREES
EKG VENTRICULAR RATE: 63 BPM

## 2020-07-11 NOTE — ED PROVIDER NOTES
ATTENDING  ADDENDUM     Care of this patient was assumed from preceding physician. The patient was seen for Chest Pain (pt with history of atrial fibrillation, reports 2 episodes just 1 hour PTA that she felt \"off\" and \"out of body,\" pt denies syncope, but states she can hear people and not respond. pt reports a discomfort in her chest, not a strong pain, c/o more of fluttering) and Palpitations  . The patient's initial evaluation and plan have been discussed with the prior provider who initially evaluated the patient. Nursing Notes, Past Medical Hx, Past Surgical Hx, Social Hx, Allergies, and Family Hx were all reviewed. ReEvaluation: When I took over the patient I was waiting for results the patient's second cardiac enzyme which came back also at 6 so this was unremarkable. We did not repeat the patient's EKG first EKG was unremarkable patient is has been symptomatically doing better since she is been in the emergency department not complaining of any pain and examination by myself is normal.  Patient can be discharged home to follow-up with her own physician. DIFFERENTIAL DIAGNOSIS/ MDM:           Follow Exit Care instructions closely. I have reviewed the disposition diagnosis with the patient and or their family/guardian. I have answered their questions and given discharge instructions. They voiced understanding of these instructions and did not have any further questions or complaints.     DIAGNOSTIC RESULTS     RADIOLOGY:   Non-plain film images such as CT, Ultrasound and MRI are read by the radiologist. Cherylynn Ohm radiographic images are visualized and preliminarily interpreted by the emergency physician with the below findings:  No orders to display       LABS:  Results for orders placed or performed during the hospital encounter of 07/10/20   CBC Auto Differential   Result Value Ref Range    WBC 9.6 3.5 - 11.0 k/uL    RBC 4.88 4.0 - 5.2 m/uL    Hemoglobin 14.9 12.0 - 16.0 g/dL    Hematocrit 44.7 36 - 46 %    MCV 91.5 80 - 100 fL    MCH 30.6 26 - 34 pg    MCHC 33.4 31 - 37 g/dL    RDW 14.0 12.5 - 15.4 %    Platelets 853 952 - 466 k/uL    MPV 8.6 6.0 - 12.0 fL    NRBC Automated NOT REPORTED per 100 WBC    Differential Type NOT REPORTED     Seg Neutrophils 57 36 - 66 %    Lymphocytes 32 24 - 44 %    Monocytes 8 2 - 11 %    Eosinophils % 2 1 - 4 %    Basophils 1 0 - 2 %    Immature Granulocytes NOT REPORTED 0 %    Segs Absolute 5.40 1.8 - 7.7 k/uL    Absolute Lymph # 3.10 1.0 - 4.8 k/uL    Absolute Mono # 0.80 0.1 - 1.2 k/uL    Absolute Eos # 0.10 0.0 - 0.4 k/uL    Basophils Absolute 0.10 0.0 - 0.2 k/uL    Absolute Immature Granulocyte NOT REPORTED 0.00 - 0.30 k/uL    WBC Morphology NOT REPORTED     RBC Morphology NOT REPORTED     Platelet Estimate NOT REPORTED    Basic Metabolic Panel w/ Reflex to MG   Result Value Ref Range    Glucose 100 (H) 70 - 99 mg/dL    BUN 14 8 - 23 mg/dL    CREATININE 0.78 0.50 - 0.90 mg/dL    Bun/Cre Ratio NOT REPORTED 9 - 20    Calcium 9.1 8.6 - 10.4 mg/dL    Sodium 141 135 - 144 mmol/L    Potassium 3.6 (L) 3.7 - 5.3 mmol/L    Chloride 103 98 - 107 mmol/L    CO2 24 20 - 31 mmol/L    Anion Gap 14 9 - 17 mmol/L    GFR Non-African American >60 >60 mL/min    GFR African American >60 >60 mL/min    GFR Comment          GFR Staging NOT REPORTED    Troponin   Result Value Ref Range    Troponin, High Sensitivity <6 0 - 14 ng/L    Troponin T NOT REPORTED <0.03 ng/mL    Troponin Interp NOT REPORTED    Protime-INR   Result Value Ref Range    Protime 11.0 9.4 - 12.6 sec    INR 1.1    APTT   Result Value Ref Range    PTT 45.0 (H) 21.3 - 31.3 sec   Troponin   Result Value Ref Range    Troponin, High Sensitivity <6 0 - 14 ng/L    Troponin T NOT REPORTED <0.03 ng/mL    Troponin Interp NOT REPORTED        ABNORMAL LABS:  Labs Reviewed   BASIC METABOLIC PANEL W/ REFLEX TO MG FOR LOW K - Abnormal; Notable for the following components:       Result Value    Glucose 100 (*)     Potassium 3.6 (*) All other components within normal limits   APTT - Abnormal; Notable for the following components:    PTT 45.0 (*)     All other components within normal limits   CBC WITH AUTO DIFFERENTIAL   TROPONIN   PROTIME-INR   TROPONIN        EKG:      EMERGENCY DEPARTMENT COURSE:   Vitals:    Vitals:    07/10/20 2015 07/10/20 2030 07/10/20 2045 07/10/20 2100   BP: (!) 149/82 (!) 155/82 (!) 148/83 (!) 142/82   Pulse: 65 65 64 62   Resp: 16 13 16 15   Temp:       TempSrc:       SpO2: 95% 97% 95% 96%   Weight:       Height:         -------------------------  BP: (!) 142/82, Temp: 97.8 °F (36.6 °C), Pulse: 62, Resp: 15          FINAL IMPRESSION      1. Atypical chest pain          DISPOSITION/PLAN   DISPOSITION Decision To Discharge 07/10/2020 09:18:05 PM    I have reviewed the disposition diagnosis with the patient and or their family/guardian. I have answered their questions and given discharge instructions. They voiced understanding of these instructions and did not have any further questions or complaints. Condition on Disposition    stable    PATIENT REFERRED TO:  Mere Gill, [address].  [address] [place] [id number]  [phone]    In 2 days        DISCHARGE MEDICATIONS:  New Prescriptions    No medications on file       (Please note that portions of this note were completed with a voice recognition program.  Efforts were made to edit the dictations but occasionally words are mis-transcribed.)    Zayas MD  Attending Emergency Physician       Susy Holley MD  07/10/20 9045

## 2020-07-15 ENCOUNTER — OFFICE VISIT (OUTPATIENT)
Dept: NEUROLOGY | Age: 60
End: 2020-07-15
Payer: MEDICARE

## 2020-07-15 ENCOUNTER — TELEPHONE (OUTPATIENT)
Dept: NEUROLOGY | Age: 60
End: 2020-07-15

## 2020-07-15 VITALS
DIASTOLIC BLOOD PRESSURE: 90 MMHG | WEIGHT: 178 LBS | SYSTOLIC BLOOD PRESSURE: 145 MMHG | HEART RATE: 71 BPM | TEMPERATURE: 98.1 F | HEIGHT: 68 IN | BODY MASS INDEX: 26.98 KG/M2

## 2020-07-15 PROBLEM — G25.0 TREMOR, ESSENTIAL: Status: ACTIVE | Noted: 2020-07-15

## 2020-07-15 PROCEDURE — 99214 OFFICE O/P EST MOD 30 MIN: CPT | Performed by: NURSE PRACTITIONER

## 2020-07-15 PROCEDURE — 1036F TOBACCO NON-USER: CPT | Performed by: NURSE PRACTITIONER

## 2020-07-15 PROCEDURE — 3017F COLORECTAL CA SCREEN DOC REV: CPT | Performed by: NURSE PRACTITIONER

## 2020-07-15 PROCEDURE — G8419 CALC BMI OUT NRM PARAM NOF/U: HCPCS | Performed by: NURSE PRACTITIONER

## 2020-07-15 PROCEDURE — G8427 DOCREV CUR MEDS BY ELIG CLIN: HCPCS | Performed by: NURSE PRACTITIONER

## 2020-07-15 RX ORDER — PRIMIDONE 50 MG/1
50 TABLET ORAL NIGHTLY
Qty: 30 TABLET | Refills: 3 | Status: SHIPPED | OUTPATIENT
Start: 2020-07-15 | End: 2020-08-27 | Stop reason: ALTCHOICE

## 2020-07-15 NOTE — PROGRESS NOTES
[facility]            [address]          [place], [address] [facility]          Dept: [phone]          Dept Fax: [phone]        MD Kendall Simmons MD Ahmed B. Otis Spanish, MD Ma Skill, MD Osborne Spotted, MD Renelle Range, CNP            7/15/2020      HISTORY OF PRESENT ILLNESS:       I had the pleasure of seeing Erica Peña, who returns for continuing neurologic care. The patient is a 79-year-old woman who was seen last on January 28, 2020 for the evaluation of several neurologic problems. She was previously seen by Dr. Tha Beach, another local neurologist.    She has been seen for over 10 years for treatment of migraine headaches. Previously, the patient's headaches have been severe rating them a 20/10 in intensity, but since she has been treated, her headaches are typically an 8/10 in intensity. She only gets headaches approximately 2-3 times per year. She will use Maxalt MLT for migraine abortive therapy. The headaches typically either start in the frontal area or the occipital area and can be pounding and throbbing. They are associated with photophobia, phonophobia, and rarely nausea. She typically will take a Maxalt to relieve the headache as well as lying down and to sleep. The headaches usually only last 30 minutes and she takes the Maxalt to abort them. She tolerates the Topamax 100 mg twice daily without side effects. She tolerates the Maxalt with minimal lethargy.     The patient has also had vertigo which started over 25 years ago. She can describe it as room spinning associated with nausea and vomiting. She is only had approximately 2-3 episodes of severe acute vertigo, but is now feeling the sense of walking off balance and sometimes feeling as though she is going to fall.   She had an extensive work-up completed by ENT with an abnormal VNG revealing a vertical nystagmus. There was a suggestion to rule out pathology in the cerebellum, Chiari malformation, multiple sclerosis, or vertebrobasilar insufficiency. MRIs of the brain, cervical spine, and CT angiography of the head were unrevealing for any significant pathology. The patient was referred to vestibular rehab, but has not yet gone.       The patient also suffers from lightheadedness and feeling as though she is going to pass out. She does have a previous history of atrial fibrillation and recognizes when she is in atrial fibrillation which will cause heart palpitations. She has severe anxiety which causes panic attacks at sometimes which causes her vision to close and in her extremities to become tingling and numb. She has several episodes where she feels like she is going to pass out, she will take her 's hand and he will assist her to a seated position. She never lies down to get rid of the symptoms. She can become diaphoretic and pale appearing. She has anxiety for many years and currently is taking Celexa for treatment of her anxiety which has improved it, but she continues to have anxiety attacks. The patient is here today stating that she continues to have lightheaded episodes. On Friday, July 10, the patient had 2 episodes back to back where she became extremely lightheaded feeling as though her world was closing in having numbness and tingling in her extremities. She always feels cold and clammy when this happens. She also felt as though she was having an irregular heartbeat. She went to the emergency department where no arrhythmia was identified. Previously, a tilt table test was ordered. The patient does follow with cardiology for a history of paroxysmal atrial fibrillation. The patient had an ablation done in the past.    Patient also is complaining of tremor. She had discussed this on her previous visit as well.   She feels as though it is becoming uncontrollable. It does not necessarily affect her ability to perform activities, she will feel as though her insides are shaking, she will feel as though her head and voice are shaking, and less often her hands will shake. She is on thyroid medication and has had normal thyroid studies. Prior testing reviewed:    MRI brain IAC 1/23/20  IMPRESSION:  Normal cerebral MRI with and without contrast intracranially, with attention to the IACs. Mild mucosal thickening redemonstrated in the left maxillary sinus.     MRI cervical spine 1/23/20  Impression:  *  Acquired chronic findings are appreciated.  These result in multilevel foraminal stenosis.  Central canal stenosis is appreciated at C4-5.  Please correlate with the clinical picture. CTA head 10/17/19 - normal  VNG 11/27/19    IMPRESSION: ABNORMAL VNG. Oculomotor testing revealed no spontaneous or gaze-evoked nystagmus. Smooth pursuit testing revealed normal gain and symmetry. Saccadic velocity, accuracy, and latencies were normal. OPK testing revealed normal gain in both directions. Post headshake testing was normal and did not elicit any clinically significant nystagmus. Nura-Hallpike was negative for torsional nystagmus, bilaterally, which contraindicates Benign Paroxysmal Positional Vertigo (BPPV). Positional testing revealed significant upbeating positional nystagmus in every position. Patient was symptomatic during Nura-Hallpike Left and Head Left postionals. Overall, a vertical nystagmus present during positional testing has been associated with damage to the cerebellum, Arnold-Chiari malformation, multiple sclerosis, vertebrobasilar insufficiency, and pharmacological influence.  Monothermal caloric irrigations revealed robust and symmetrical responses with normal fixation suppression.                 PAST MEDICAL HISTORY:         Diagnosis Date    Atrial fibrillation ([address]     Headache     migraine's since 2003    Hypertension     Kidney mass     Kidney stone     Vertigo         PAST SURGICAL HISTORY:         Procedure Laterality Date    CYST REMOVAL      On top of head    TUBAL LIGATION Bilateral     VENTRICULAR ABLATION SURGERY  08/2019        SOCIAL HISTORY:     Social History     Socioeconomic History    Marital status:      Spouse name: Not on file    Number of children: Not on file    Years of education: Not on file    Highest education level: Not on file   Occupational History    Not on file   Social Needs    Financial resource strain: Not on file    Food insecurity     Worry: Not on file     Inability: Not on file    Transportation needs     Medical: Not on file     Non-medical: Not on file   Tobacco Use    Smoking status: Never Smoker    Smokeless tobacco: Never Used   Substance and Sexual Activity    Alcohol use: Not Currently     Comment: rarely    Drug use: Yes     Types: Marijuana     Comment: for internal tremor    Sexual activity: Yes     Partners: Male   Lifestyle    Physical activity     Days per week: Not on file     Minutes per session: Not on file    Stress: Not on file   Relationships    Social connections     Talks on phone: Not on file     Gets together: Not on file     Attends [employer] service: Not on file     Active member of club or organization: Not on file     Attends meetings of clubs or organizations: Not on file     Relationship status: Not on file    Intimate partner violence     Fear of current or ex partner: Not on file     Emotionally abused: Not on file     Physically abused: Not on file     Forced sexual activity: Not on file   Other Topics Concern    Not on file   Social History Narrative    Not on file       CURRENT MEDICATIONS:     Current Outpatient Medications   Medication Sig Dispense Refill    primidone (MYSOLINE) 50 MG tablet Take 1 tablet by mouth nightly 30 tablet 3    busPIRone (BUSPAR) 10 MG tablet Take 10 mg by mouth 2 times daily      ramipril (ALTACE) 2.5 MG capsule Take 2.5 mg by mouth daily      potassium chloride (KLOR-CON M) 20 MEQ extended release tablet Take 1 tablet by mouth 3 times daily 90 tablet 1    DULoxetine (CYMBALTA) 30 MG extended release capsule Take 30 mg by mouth daily      levothyroxine (SYNTHROID) 25 MCG tablet Take 75 mcg by mouth Daily       dabigatran (PRADAXA) 150 MG capsule Take 150 mg by mouth 2 times daily      Topiramate (TOPAMAX PO) Take 100 mg by mouth 2 times daily       albuterol sulfate HFA (PROVENTIL HFA) 108 (90 BASE) MCG/ACT inhaler Inhale 2 puffs into the lungs every 4 hours as needed for Wheezing or Shortness of Breath (Space out to every 6 hours as symptoms improve) Space out to every 6 hours as symptoms improve. 1 Inhaler 0     No current facility-administered medications for this visit. ALLERGIES:     Allergies   Allergen Reactions    Aspartame And Phenylalanine     Penicillins Diarrhea                                 REVIEW OF SYSTEMS                   All items selected indicate a positive finding. Those items not selected are negative.   Constitutional [] Weight loss/gain   [x] Fatigue  [] Fever/Chills   HEENT [] Hearing Loss  [] Visual Disturbance  [] Tinnitus  [] Eye pain   Respiratory [] Shortness of Breath  [] Cough  [] Snoring   Cardiovascular [] Chest Pain  [x] Palpitations  [x] Lightheaded   GI [] Constipation  [] Diarrhea  [] Swallowing change  [] Nausea/vomiting    [] Urinary Frequency  [] Urinary Urgency   Musculoskeletal [] Neck pain  [] Back pain  [] Muscle pain  [] Restless legs   Dermatologic [] Skin changes   Neurologic [] Memory loss/confusion  [] Seizures  [] Trouble walking or imbalance  [] Dizziness  [] Sleep disturbance  [] Weakness  [] Numbness  [x] Tremors  [] Speech Difficulty  [x] Headaches  [x] Light Sensitivity  [x] Sound Sensitivity   Endocrinology []Excessive thirst  []Excessive hunger   Psychiatric [x] Anxiety/Depression  [] Hallucination   Allergy/immunology []Hives/environmental allergies XII - tongue midline without atrophy or fasciculation      Motor function  Normal muscle bulk and tone; strength 5/5 on all 4 extremities, no pronator drift      Sensory function Intact to light touch, pinprick, vibration, proprioception on all 4 extremities      Cerebellar Intact fine motor movement. No involuntary movements or tremors. No ataxia or dysmetria on finger to nose or heel to shin testing      Reflex function DTR 2+ on bilateral UE and LE, symmetric. Negative Babinski      Gait                   normal base and arm swing                  ASSESSMENT AND PLAN:           In summary, your patient, Tae Kraus exhibits the following, with associated plan:    1. Migraine headaches, currently well controlled with her current prophylactic and abortive regimen  1. Continue Topamax 100 mg twice daily  2. Continue Maxalt MLT 10 mg daily  2. Refractory vertigo, which is stable at this time  3. Episodes of lightheadedness which can be multifactorial.  Differential diagnoses include heart arrhythmia, neurocardiogenic syncope, or panic attacks associated with her anxiety  1. Obtain tilt table testing  2. Cardiac event monitor  3. Continue BuSpar 5 mg twice daily  4. Schedule an appointment with cardiology regarding her atrial fibrillation  5. Continue to drink 60 ounces of water daily in addition to an 8 ounce Gatorade as well as compression stockings  4. Essential tremor  1. Start primidone 50 mg, 1/2 tablet at bedtime daily.   If no response increase to 50 mg at bedtime daily            Signed: Seldon Aschoff, CNP      *Please note that portions of this note were completed with a voice recognition program.  Although every effort was made to insure the accuracy of this automated transcription, some errors in transcription may have occurred, occasionally words and are mis-transcribed

## 2020-07-16 RX ORDER — BUSPIRONE HYDROCHLORIDE 5 MG/1
TABLET ORAL
Qty: 30 TABLET | Refills: 5 | Status: SHIPPED | OUTPATIENT
Start: 2020-07-16 | End: 2021-01-28

## 2020-07-16 NOTE — TELEPHONE ENCOUNTER
Pharmacy requesting a  refill of Buspar 5 mg.       Medication active on med list yes      Date of last prescription 1/28/20  with 5 refills verified on 7/16/2020   verified by DOUG SHEETS      Date of last appointment 7/15/2020    Next Visit Date:  8/27/2020

## 2020-08-25 ENCOUNTER — HOSPITAL ENCOUNTER (OUTPATIENT)
Age: 60
Discharge: HOME | End: 2020-08-25
Payer: MEDICARE

## 2020-08-25 LAB
ANION GAP SERPL CALCULATED.3IONS-SCNC: 14 MMOL/L (ref 9–17)
BUN BLDV-MCNC: 10 MG/DL (ref 8–23)
BUN/CREAT BLD: ABNORMAL (ref 9–20)
CALCIUM SERPL-MCNC: 9.6 MG/DL (ref 8.6–10.4)
CHLORIDE BLD-SCNC: 107 MMOL/L (ref 98–107)
CO2: 22 MMOL/L (ref 20–31)
CREAT SERPL-MCNC: 0.78 MG/DL (ref 0.5–0.9)
GFR AFRICAN AMERICAN: >60 ML/MIN
GFR NON-AFRICAN AMERICAN: >60 ML/MIN
GFR SERPL CREATININE-BSD FRML MDRD: ABNORMAL ML/MIN/{1.73_M2}
GFR SERPL CREATININE-BSD FRML MDRD: ABNORMAL ML/MIN/{1.73_M2}
GLUCOSE BLD-MCNC: 106 MG/DL (ref 70–99)
POTASSIUM SERPL-SCNC: 4 MMOL/L (ref 3.7–5.3)
SODIUM BLD-SCNC: 143 MMOL/L (ref 135–144)

## 2020-08-25 PROCEDURE — 80048 BASIC METABOLIC PNL TOTAL CA: CPT

## 2020-08-25 PROCEDURE — 36415 COLL VENOUS BLD VENIPUNCTURE: CPT

## 2020-08-27 ENCOUNTER — OFFICE VISIT (OUTPATIENT)
Dept: NEUROLOGY | Age: 60
End: 2020-08-27
Payer: MEDICARE

## 2020-08-27 VITALS
DIASTOLIC BLOOD PRESSURE: 85 MMHG | HEART RATE: 77 BPM | TEMPERATURE: 97.5 F | WEIGHT: 185 LBS | HEIGHT: 68 IN | BODY MASS INDEX: 28.04 KG/M2 | SYSTOLIC BLOOD PRESSURE: 121 MMHG

## 2020-08-27 PROCEDURE — 3017F COLORECTAL CA SCREEN DOC REV: CPT | Performed by: NURSE PRACTITIONER

## 2020-08-27 PROCEDURE — G8427 DOCREV CUR MEDS BY ELIG CLIN: HCPCS | Performed by: NURSE PRACTITIONER

## 2020-08-27 PROCEDURE — 99214 OFFICE O/P EST MOD 30 MIN: CPT | Performed by: NURSE PRACTITIONER

## 2020-08-27 PROCEDURE — 1036F TOBACCO NON-USER: CPT | Performed by: NURSE PRACTITIONER

## 2020-08-27 PROCEDURE — G8419 CALC BMI OUT NRM PARAM NOF/U: HCPCS | Performed by: NURSE PRACTITIONER

## 2020-08-27 RX ORDER — PROPRANOLOL HCL 60 MG
60 CAPSULE, EXTENDED RELEASE 24HR ORAL DAILY
Qty: 30 CAPSULE | Refills: 5 | Status: SHIPPED | OUTPATIENT
Start: 2020-08-27 | End: 2021-02-04 | Stop reason: SDUPTHER

## 2020-08-27 NOTE — PROGRESS NOTES
possible. At the patient's last visit, she was also complaining of tremor. We had discussed this on her previous visit as well. She feels as though it is becoming uncontrollable. It does not necessarily affect her ability to perform activities, but she feels as though her insides are shaking. She has had normal thyroid function studies. She was started on primidone 50 mg daily at her last visit. The patient has had excellent results in controlling her tremor with the use of primidone, but she can took it in the evening which caused her to have insomnia and then she started taking it in the morning and she sleeps most of the day because of lethargy. Prior testing reviewed:    MRI brain IAC 1/23/20  IMPRESSION:  Normal cerebral MRI with and without contrast intracranially, with attention to the IACs. Mild mucosal thickening redemonstrated in the left maxillary sinus.     MRI cervical spine 1/23/20  Impression:  *  Acquired chronic findings are appreciated.  These result in multilevel foraminal stenosis.  Central canal stenosis is appreciated at C4-5.  Please correlate with the clinical picture. CTA head 10/17/19 - normal  VNG 11/27/19    IMPRESSION: ABNORMAL VNG. Oculomotor testing revealed no spontaneous or gaze-evoked nystagmus. Smooth pursuit testing revealed normal gain and symmetry. Saccadic velocity, accuracy, and latencies were normal. OPK testing revealed normal gain in both directions. Post headshake testing was normal and did not elicit any clinically significant nystagmus. [place]-Hallpike was negative for torsional nystagmus, bilaterally, which contraindicates Benign Paroxysmal Positional Vertigo (BPPV). Positional testing revealed significant upbeating positional nystagmus in every position. Patient was symptomatic during [place]-Hallpike Left and Head Left postionals.  Overall, a vertical nystagmus present during positional testing has been associated with damage to the cerebellum, Arnold-Chiari malformation, multiple sclerosis, vertebrobasilar insufficiency, and pharmacological influence.  Monothermal caloric irrigations revealed robust and symmetrical responses with normal fixation suppression.                    PAST MEDICAL HISTORY:         Diagnosis Date    Atrial fibrillation ([address]     Headache     migraine's since 2003    Hypertension     Kidney mass     Kidney stone     Vertigo         PAST SURGICAL HISTORY:         Procedure Laterality Date    CYST REMOVAL      On top of head    TUBAL LIGATION Bilateral     VENTRICULAR ABLATION SURGERY  08/2019        SOCIAL HISTORY:     Social History     Socioeconomic History    Marital status:      Spouse name: Not on file    Number of children: Not on file    Years of education: Not on file    Highest education level: Not on file   Occupational History    Not on file   Social Needs    Financial resource strain: Not on file    Food insecurity     Worry: Not on file     Inability: Not on file    Transportation needs     Medical: Not on file     Non-medical: Not on file   Tobacco Use    Smoking status: Never Smoker    Smokeless tobacco: Never Used   Substance and Sexual Activity    Alcohol use: Yes     Comment: rarely    Drug use: Yes     Types: Marijuana     Comment: for internal tremor    Sexual activity: Yes     Partners: Male   Lifestyle    Physical activity     Days per week: Not on file     Minutes per session: Not on file    Stress: Not on file   Relationships    Social connections     Talks on phone: Not on file     Gets together: Not on file     Attends Denominational service: Not on file     Active member of club or organization: Not on file     Attends meetings of clubs or organizations: Not on file     Relationship status: Not on file    Intimate partner violence     Fear of current or ex partner: Not on file     Emotionally abused: Not on file     Physically abused: Not on file     Forced sexual activity: Not on file   Other Topics Concern    Not on file   Social History Narrative    Not on file       CURRENT MEDICATIONS:     Current Outpatient Medications   Medication Sig Dispense Refill    propranolol (INDERAL LA) 60 MG extended release capsule Take 1 capsule by mouth daily 30 capsule 5    busPIRone (BUSPAR) 5 MG tablet Take 1 tablet by mouth twice daily 30 tablet 5    ramipril (ALTACE) 2.5 MG capsule Take 2.5 mg by mouth daily      potassium chloride (KLOR-CON M) 20 MEQ extended release tablet Take 1 tablet by mouth 3 times daily 90 tablet 1    DULoxetine (CYMBALTA) 30 MG extended release capsule Take 30 mg by mouth daily      levothyroxine (SYNTHROID) 25 MCG tablet Take 75 mcg by mouth Daily       dabigatran (PRADAXA) 150 MG capsule Take 150 mg by mouth 2 times daily      Topiramate (TOPAMAX PO) Take 100 mg by mouth 2 times daily       albuterol sulfate HFA (PROVENTIL HFA) 108 (90 BASE) MCG/ACT inhaler Inhale 2 puffs into the lungs every 4 hours as needed for Wheezing or Shortness of Breath (Space out to every 6 hours as symptoms improve) Space out to every 6 hours as symptoms improve. 1 Inhaler 0     No current facility-administered medications for this visit. ALLERGIES:     Allergies   Allergen Reactions    Aspartame And Phenylalanine     Penicillins Diarrhea                                 REVIEW OF SYSTEMS                   All items selected indicate a positive finding. Those items not selected are negative.   Constitutional [] Weight loss/gain   [x] Fatigue  [] Fever/Chills   HEENT [] Hearing Loss  [] Visual Disturbance  [] Tinnitus  [] Eye pain   Respiratory [] Shortness of Breath  [] Cough  [] Snoring   Cardiovascular [] Chest Pain  [] Palpitations  [x] Lightheaded   GI [] Constipation  [] Diarrhea  [] Swallowing change  [] Nausea/vomiting    [] Urinary Frequency  [] Urinary Urgency   Musculoskeletal [] Neck pain  [] Back pain  [] Muscle pain  [] Restless legs   Dermatologic [] Skin changes Neurologic [] Memory loss/confusion  [] Seizures  [] Trouble walking or imbalance  [x] Dizziness  [] Sleep disturbance  [] Weakness  [] Numbness  [x] Tremors  [] Speech Difficulty  [] Headaches  [] Light Sensitivity  [] Sound Sensitivity   Endocrinology []Excessive thirst  []Excessive hunger   Psychiatric [x] Anxiety/Depression  [] Hallucination   Allergy/immunology []Hives/environmental allergies   Hematologic/lymph [] Abnormal bleeding  [] Abnormal bruising         PHYSICAL EXAMINATION:       Vitals:    08/27/20 1307   BP: 121/85   Pulse: 77   Temp: 97.5 °F (36.4 °C)                                              .                                                                                                     General Appearance:  Alert, cooperative, no signs of distress, appears stated age   Head:  Normocephalic, no signs of trauma   Eyes:  Conjunctiva/corneas clear;  eyelids intact   Ears:  Normal external ear and canals   Nose: Nares normal, mucosa normal, no drainage    Throat: Lips and tongue normal; teeth normal;  gums normal   Neck: Supple, intact flexion, extension and rotation;   trachea midline;  no adenopathy;   thyroid: not enlarged;   no carotid pulse abnormality   Back:   Symmetric, no curvature, ROM adequate   Lungs:   Respirations unlabored   Heart:  Regular rate and rhythm           Extremities: Extremities normal, no cyanosis, no edema   Pulses: Symmetric over head and neck   Skin: Skin color, texture normal, no rashes, no lesions                                     NEUROLOGIC EXAMINATION    Neurologic Exam  Mental status    Alert and oriented x 3; intact memory with no confusion, speech or language problems; no hallucinations or delusions  Fund of information appropriate for level of education    Cranial nerves    II - visual fields intact to confrontation bilaterally  III, IV, VI - extra-ocular muscles full: no pupillary defect; no [place], no nystagmus, no ptosis   V - normal facial sensation VII - normal facial symmetry                                                             VIII - intact hearing                                                                             IX, X - symmetrical palate                                                                  XI - symmetrical shoulder shrug                                                       XII - tongue midline without atrophy or fasciculation      Motor function  Normal muscle bulk and tone; strength 5/5 on all 4 extremities, no pronator drift      Sensory function Intact to light touch, pinprick, vibration, proprioception on all 4 extremities      Cerebellar Intact fine motor movement. No involuntary movements or tremors. No ataxia or dysmetria on finger to nose or heel to shin testing      Reflex function DTR 2+ on bilateral UE and LE, symmetric. Negative Babinski      Gait                   normal base and arm swing                  ASSESSMENT AND PLAN:           In summary, your patient, Marck Taveras exhibits the following, with associated plan:    1. Migraine headaches, currently well controlled  1. Continue Topamax 100 mg twice daily  2. Continue Maxalt MLT 10 mg daily  2. Vertigo which has improved significantly  3. Episodes of lightheadedness which can be multifactorial.  Differential diagnoses include cardiac arrhythmia, neurocardiogenic syncope, or panic attacks associated with her anxiety  1. Obtain tilt table testing  2. Patient is currently in the process of a cardiac event monitor  3. Continue BuSpar 5 mg twice daily  4. Continue to drink 60 ounces of water daily in addition to 8 ounces of Gatorade and patient was also advised to wear compression stockings  5. The patient will be seen back in 2 months  4. Essential tremor  1.  Stop primidone and start propranolol 60 mg daily            Signed: Israel Blount CNP      *Please note that portions of this note were completed with a voice recognition program.  Although every effort was made to insure the accuracy of this automated transcription, some errors in transcription may have occurred, occasionally words and are mis-transcribed

## 2020-10-29 ENCOUNTER — OFFICE VISIT (OUTPATIENT)
Dept: NEUROLOGY | Age: 60
End: 2020-10-29
Payer: MEDICARE

## 2020-10-29 VITALS
WEIGHT: 188 LBS | TEMPERATURE: 97.2 F | HEIGHT: 68 IN | HEART RATE: 72 BPM | BODY MASS INDEX: 28.49 KG/M2 | SYSTOLIC BLOOD PRESSURE: 130 MMHG | DIASTOLIC BLOOD PRESSURE: 77 MMHG

## 2020-10-29 PROCEDURE — G8419 CALC BMI OUT NRM PARAM NOF/U: HCPCS | Performed by: NURSE PRACTITIONER

## 2020-10-29 PROCEDURE — G8484 FLU IMMUNIZE NO ADMIN: HCPCS | Performed by: NURSE PRACTITIONER

## 2020-10-29 PROCEDURE — 99214 OFFICE O/P EST MOD 30 MIN: CPT | Performed by: NURSE PRACTITIONER

## 2020-10-29 PROCEDURE — 3017F COLORECTAL CA SCREEN DOC REV: CPT | Performed by: NURSE PRACTITIONER

## 2020-10-29 PROCEDURE — 1036F TOBACCO NON-USER: CPT | Performed by: NURSE PRACTITIONER

## 2020-10-29 PROCEDURE — G8427 DOCREV CUR MEDS BY ELIG CLIN: HCPCS | Performed by: NURSE PRACTITIONER

## 2020-10-29 RX ORDER — RIZATRIPTAN BENZOATE 10 MG/1
10 TABLET ORAL
Qty: 12 TABLET | Refills: 5 | Status: SHIPPED | OUTPATIENT
Start: 2020-10-29 | End: 2021-08-10 | Stop reason: SDUPTHER

## 2020-10-29 RX ORDER — TOPIRAMATE 100 MG/1
100 TABLET, FILM COATED ORAL 2 TIMES DAILY
Qty: 60 TABLET | Refills: 5 | Status: SHIPPED | OUTPATIENT
Start: 2020-10-29 | End: 2021-03-30

## 2020-10-29 NOTE — PROGRESS NOTES
[facility]            [address]          [place], [address] [facility]          Dept: [phone]          Dept Fax: [phone]        MD Krystal Londono MD Ahmed B. Dane Fusi, MD Al Jewett, MD Geraldine Condon, MD Pecolia Hutching, ASIF            10/29/2020      HISTORY OF PRESENT ILLNESS:       I had the pleasure of seeing Alix Schulz, who returns for continuing neurologic care. Patient is a 48-year-old woman who was seen last on August 27, 2020 for management of multiple neurologic problems. She has been seen for over 10 years for the treatment of migraine headaches. The patient's previous headaches have been severe rating them at 10/10 in intensity, but since she has been treated, her headaches are typically an 8/10 in intensity. She has previously reported she gets headaches approximately 2 times per year. She used Maxalt MLT for migraine abortive therapy. For migraine prophylaxis, she had taken Topamax 100 mg twice daily without side effects. The headaches are pounding and throbbing and usually in the frontal or occipital area. They are associated with photophobia, phonophobia, and rarely nausea. The headaches typically last only 30 minutes and she takes Maxalt to abort them. She reported no change in the intensity or frequency of her headaches. Patient also has vertigo which started over 25 years ago. She described it as a room spinning sensation associated with nausea and vomiting. She had only had approximately 2-3 episodes of severe dizziness but was feeling a sense of walking off balance and sometimes feeling as though she has going to fall. She had an extensive work-up completed by ENT with abnormal VNG revealing vertical nystagmus.   There was a suggestion to rule out pathology of the cerebellum, Chiari malformation, multiple sclerosis or vertebrobasilar insufficiency. MRIs of the brain, cervical spine, and CT angiography of the head were unrevealing for any significant pathology. The patient had been referred to vestibular rehab, but had not attended prior to her August 2020 appointment. The patient had one episode previous to her August 2020 visit. Patient has also had episodes of lightheadedness, feeling as though she is going to pass out. She does have a history of atrial fibrillation and recognizes when she is in atrial fibrillation which will cause her heart palpitations. She has severe anxiety which causes panic attacks at times, which causes her vision to close in her extremities to become tingly and numb. She has several episodes where she feels like she is going to pass out. She will take her 's hand and he will help her get into a seated position. She never lies down to get rid of the symptoms. She can become diaphoretic and pale appearing. She has had anxiety for many years and currently was taking Celexa for treatment of her anxiety which has improved. At her last visit, the patient had had 2 episodes where she became very lightheaded feeling as though her world was closing in and having numbness and tingling in her extremities. She always feels cold and clammy when this happens. She also felt as though she was having an irregular heartbeat. She did have a tilt table test ordered, however she does see a cardiologist for paroxysmal atrial fibrillation. Patient takes BuSpar 5 mg twice daily for anxiety. She was also advised to drink 60 ounces of water in addition to an 8 ounce Gatorade and wearing compression stockings. The patient  had 2 episodes where she began to feel the room closing in. She had a cardiac event monitor on for 30 days. Previously, the patient was also complaining of tremor. She feels as though it was becoming uncontrollable.   It does not necessarily affect her ability to perform activities, but she feels as in multilevel foraminal stenosis.  Central canal stenosis is appreciated at C4-5.  Please correlate with the clinical picture. CTA head 10/17/19 - normal  VNG 11/27/19    IMPRESSION: ABNORMAL VNG. Oculomotor testing revealed no spontaneous or gaze-evoked nystagmus. Smooth pursuit testing revealed normal gain and symmetry. Saccadic velocity, accuracy, and latencies were normal. OPK testing revealed normal gain in both directions. Post headshake testing was normal and did not elicit any clinically significant nystagmus. [place]-Hallpike was negative for torsional nystagmus, bilaterally, which contraindicates Benign Paroxysmal Positional Vertigo (BPPV). Positional testing revealed significant upbeating positional nystagmus in every position. Patient was symptomatic during [place]-Hallpike Left and Head Left postionals. Overall, a vertical nystagmus present during positional testing has been associated with damage to the cerebellum, Arnold-Chiari malformation, multiple sclerosis, vertebrobasilar insufficiency, and pharmacological influence. Monothermal caloric irrigations revealed robust and symmetrical responses with normal fixation suppression.                PAST MEDICAL HISTORY:         Diagnosis Date    Atrial fibrillation ([address]     Headache     migraine's since 2003    Hypertension     Kidney mass     Kidney stone     Vertigo         PAST SURGICAL HISTORY:         Procedure Laterality Date    CYST REMOVAL      On top of head    TUBAL LIGATION Bilateral     VENTRICULAR ABLATION SURGERY  08/2019        SOCIAL HISTORY:     Social History     Socioeconomic History    Marital status:      Spouse name: Not on file    Number of children: Not on file    Years of education: Not on file    Highest education level: Not on file   Occupational History    Not on file   Social Needs    Financial resource strain: Not on file    Food insecurity     Worry: Not on file     Inability: Not on file   Peppercorn needs     Medical: Not on file     Non-medical: Not on file   Tobacco Use    Smoking status: Never Smoker    Smokeless tobacco: Never Used   Substance and Sexual Activity    Alcohol use: Yes     Comment: rarely    Drug use: Yes     Types: Marijuana     Comment: for internal tremor    Sexual activity: Yes     Partners: Male   Lifestyle    Physical activity     Days per week: Not on file     Minutes per session: Not on file    Stress: Not on file   Relationships    Social connections     Talks on phone: Not on file     Gets together: Not on file     Attends Congregational service: Not on file     Active member of club or organization: Not on file     Attends meetings of clubs or organizations: Not on file     Relationship status: Not on file    Intimate partner violence     Fear of current or ex partner: Not on file     Emotionally abused: Not on file     Physically abused: Not on file     Forced sexual activity: Not on file   Other Topics Concern    Not on file   Social History Narrative    Not on file       CURRENT MEDICATIONS:     Current Outpatient Medications   Medication Sig Dispense Refill    propranolol (INDERAL LA) 60 MG extended release capsule Take 1 capsule by mouth daily 30 capsule 5    busPIRone (BUSPAR) 5 MG tablet Take 1 tablet by mouth twice daily 30 tablet 5    ramipril (ALTACE) 2.5 MG capsule Take 2.5 mg by mouth daily      potassium chloride (KLOR-CON M) 20 MEQ extended release tablet Take 1 tablet by mouth 3 times daily 90 tablet 1    DULoxetine (CYMBALTA) 30 MG extended release capsule Take 30 mg by mouth daily      levothyroxine (SYNTHROID) 25 MCG tablet Take 75 mcg by mouth Daily       dabigatran (PRADAXA) 150 MG capsule Take 150 mg by mouth 2 times daily      Topiramate (TOPAMAX PO) Take 100 mg by mouth 2 times daily       albuterol sulfate HFA (PROVENTIL HFA) 108 (90 BASE) MCG/ACT inhaler Inhale 2 puffs into the lungs every 4 hours as needed for Wheezing or Shortness of Breath (Space out to every 6 hours as symptoms improve) Space out to every 6 hours as symptoms improve. 1 Inhaler 0     No current facility-administered medications for this visit. ALLERGIES:     Allergies   Allergen Reactions    Aspartame And Phenylalanine     Penicillins Diarrhea                                 REVIEW OF SYSTEMS        All items selected indicate a positive finding. Those items not selected are negative. Constitutional [] Weight loss/gain   [] Fatigue  [] Fever/Chills   HEENT [] Hearing Loss  [x] Visual Disturbance  [] Tinnitus  [] Eye pain   Respiratory [] Shortness of Breath  [] Cough  [] Snoring   Cardiovascular [] Chest Pain  [] Palpitations  [x] Lightheaded   GI [] Constipation  [] Diarrhea  [] Swallowing change  [] Nausea/vomiting    [] Urinary Frequency  [] Urinary Urgency   Musculoskeletal [] Neck pain  [] Back pain  [] Muscle pain  [] Restless legs   Dermatologic [] Skin changes   Neurologic [] Memory loss/confusion  [] Seizures  [] Trouble walking or imbalance  [] Dizziness  [] Sleep disturbance  [] Weakness  [] Numbness  [x] Tremors  [] Speech Difficulty  [x] Headaches  [] Light Sensitivity  [] Sound Sensitivity   Endocrinology []Excessive thirst  []Excessive hunger   Psychiatric [x] Anxiety/Depression  [] Hallucination   Allergy/immunology []Hives/environmental allergies   Hematologic/lymph [] Abnormal bleeding  [] Abnormal bruising         PHYSICAL EXAMINATION:       Vitals:    10/29/20 1102   BP: 130/77   Pulse: 72   Temp: 97.2 °F (36.2 °C)                                              .                                                                                                     General Appearance:  Alert, cooperative, no signs of distress, appears stated age   Head:  Normocephalic, no signs of trauma   Eyes:  Conjunctiva/corneas clear;  eyelids intact   Ears:  Normal external ear and canals   Nose: Nares normal, mucosa normal, no drainage    Throat: Lips and tongue summary, your patient, Katherine Zamora exhibits the following, with associated plan:    1. Migraine headaches, currently well controlled  1. Continue Topamax 100 mg twice daily  2. Continue Maxalt MLT 10 mg daily  2. Vertigo which has improved significantly  3. Episodes of lightheadedness which can be multifactorial.  Differential diagnoses include cardiac arrhythmia, neurocardiogenic syncope, or panic attacks associated with her anxiety  1. Continue BuSpar 5 mg twice daily  2. Continue to drink 60 ounces of water daily in addition to 8 ounces of Gatorade and patient was also advised to wear compression stockings  3. Patient was also advised to take her blood pressure and pulse when 1 of these events occur  4. The patient will be seen back in 3 months  4. Essential tremor  1. Continue propranolol 60 mg daily            Signed: Jad Barfield CNP      *Please note that portions of this note were completed with a voice recognition program.  Although every effort was made to insure the accuracy of this automated transcription, some errors in transcription may have occurred, occasionally words and are mis-transcribed    Scribe Attestation:   By signing my name below, I, Jordy Pate, attest that this documentation has been prepared under the direction and in the presence of Jad Barfield CNP.

## 2021-01-28 RX ORDER — BUSPIRONE HYDROCHLORIDE 5 MG/1
TABLET ORAL
Qty: 60 TABLET | Refills: 0 | Status: SHIPPED | OUTPATIENT
Start: 2021-01-28 | End: 2021-02-04 | Stop reason: SDUPTHER

## 2021-01-28 NOTE — TELEPHONE ENCOUNTER
Pharmacy requesting refill of Buspar.       Medication active on med list yes      Date of last fill: 7/16/20  with 5 refills verified on 1/28/21  verified by SUDEEP RN      Date of last appointment 10/29/20    Next Visit Date:  2/2/2021

## 2021-02-04 ENCOUNTER — OFFICE VISIT (OUTPATIENT)
Dept: NEUROLOGY | Age: 61
End: 2021-02-04
Payer: MEDICARE

## 2021-02-04 VITALS
DIASTOLIC BLOOD PRESSURE: 85 MMHG | WEIGHT: 185 LBS | HEIGHT: 68 IN | HEART RATE: 59 BPM | SYSTOLIC BLOOD PRESSURE: 124 MMHG | TEMPERATURE: 97.1 F | BODY MASS INDEX: 28.04 KG/M2

## 2021-02-04 DIAGNOSIS — R55 SYNCOPE AND COLLAPSE: ICD-10-CM

## 2021-02-04 DIAGNOSIS — G43.009 MIGRAINE WITHOUT AURA AND WITHOUT STATUS MIGRAINOSUS, NOT INTRACTABLE: ICD-10-CM

## 2021-02-04 DIAGNOSIS — G25.0 TREMOR, ESSENTIAL: ICD-10-CM

## 2021-02-04 DIAGNOSIS — R42 VERTIGO: Primary | ICD-10-CM

## 2021-02-04 PROCEDURE — G8427 DOCREV CUR MEDS BY ELIG CLIN: HCPCS | Performed by: NURSE PRACTITIONER

## 2021-02-04 PROCEDURE — 99214 OFFICE O/P EST MOD 30 MIN: CPT | Performed by: NURSE PRACTITIONER

## 2021-02-04 PROCEDURE — 3017F COLORECTAL CA SCREEN DOC REV: CPT | Performed by: NURSE PRACTITIONER

## 2021-02-04 PROCEDURE — G8419 CALC BMI OUT NRM PARAM NOF/U: HCPCS | Performed by: NURSE PRACTITIONER

## 2021-02-04 PROCEDURE — G8484 FLU IMMUNIZE NO ADMIN: HCPCS | Performed by: NURSE PRACTITIONER

## 2021-02-04 PROCEDURE — 1036F TOBACCO NON-USER: CPT | Performed by: NURSE PRACTITIONER

## 2021-02-04 RX ORDER — DULOXETIN HYDROCHLORIDE 60 MG/1
CAPSULE, DELAYED RELEASE ORAL
COMMUNITY
Start: 2021-02-01

## 2021-02-04 RX ORDER — PROPRANOLOL HCL 60 MG
60 CAPSULE, EXTENDED RELEASE 24HR ORAL DAILY
Qty: 30 CAPSULE | Refills: 5 | Status: SHIPPED | OUTPATIENT
Start: 2021-02-04 | End: 2021-08-10 | Stop reason: SDUPTHER

## 2021-02-04 RX ORDER — BUSPIRONE HYDROCHLORIDE 5 MG/1
5 TABLET ORAL 2 TIMES DAILY
Qty: 60 TABLET | Refills: 5 | Status: SHIPPED | OUTPATIENT
Start: 2021-02-04 | End: 2021-08-10 | Stop reason: SDUPTHER

## 2021-02-04 NOTE — PROGRESS NOTES
[facility]            [address]          [facility], [address]          Dept: [phone]          Dept Fax: [phone]        MD Brenda Maher, MD Perri Huffman, MD Beckie Victoria, MD Delfino Plasencia, CNP            2/4/2021      HISTORY OF PRESENT ILLNESS:       I had the pleasure of seeing Khris Carlisle, who returns for continuing neurologic care. The patient is a 63year old woman who was seen last on October 29, 2020 for treatment of migraine headaches, episodes of lightheadedness, and essential tremor. The patient also has a history of vertigo, which has improved significantly. Today, the patient reports that her vertigo occurs intermittently. Migraines - At the last visit, the patient reported getting 2 migraines per month. She is treated with Topamax 100 mg twice daily and Maxalt 10 mg as needed for abortive therapy. Today, the patient reports that there has been no change in frequency or intensity of her migraines since the last visit. She continues to tolerate the Topamax, but does admit to having some mild memory difficulties recently. Her most recent headache was a few nights ago, but she was able to get rid of it by going to sleep.      Headache location: Frontal and occipital areas   Headache quality: Pounding, throbbing  Associated factors:  nausea, Photophobia, Phonophobia  Intensity: 8/10  Headache chronicity: Over 10 years  Headache frequency: 2 migraines per year, mild headaches once per month  Aggravating factors : light, sounds  Relieving factors: Maxalt  Prophylactic medications: Topamax 100 mg  Abortive medications: Maxalt 10 mg  Previously used medications: Topamax Lightheadedness - These episodes of lightheadedness could be multifactorial. Differential diagnoses include cardiac arrhythmia, neurocardiogenic syncope, or panic attacks associated with her anxiety. She is treated with BuSpar 5 mg twice daily and was advised to drink 60 ounces of water, 8 ounces of Gatorade, and wear compression stockings daily. She was also instructed to take her blood pressure and pulse during these episodes. Today, the patient reports that she has not had a recent syncopal episode, but has felt lightheaded occasionally. She admits to drinking a sufficient amount of water, but has not been wearing compression socks because she feels they are uncomfortable. Tremor - For her tremor, the patient is treated with propranolol 60 mg daily. Today, the patient reports that her tremor has been worsening recently which could be due to an increase in the level of her anxiety recently. Because the tremor occurs intermittently with stress, she does not wish to increase the dosage of propranolol. Testing reviewed:     CT Head 06/09/2020  Impression    No acute intracranial abnormality.               MRI brain IAC 1/23/20  IMPRESSION:  Normal cerebral MRI with and without contrast intracranially, with attention to the IACs. Mild mucosal thickening redemonstrated in the left maxillary sinus.     MRI cervical spine 1/23/20  Impression:  *  Acquired chronic findings are appreciated.  These result in multilevel foraminal stenosis.  Central canal stenosis is appreciated at C4-5.  Please correlate with the clinical picture.   CTA head 10/17/19 - normal  VNG 11/27/19 IMPRESSION: ABNORMAL VNG. Oculomotor testing revealed no spontaneous or gaze-evoked nystagmus. Smooth pursuit testing revealed normal gain and symmetry. Saccadic velocity, accuracy, and latencies were normal. OPK testing revealed normal gain in both directions. Post headshake testing was normal and did not elicit any clinically significant nystagmus. Nura-Hallpike was negative for torsional nystagmus, bilaterally, which contraindicates Benign Paroxysmal Positional Vertigo (BPPV). Positional testing revealed significant upbeating positional nystagmus in every position. Patient was symptomatic during Nura-Hallpike Left and Head Left postionals. Overall, a vertical nystagmus present during positional testing has been associated with damage to the cerebellum, Arnold-Chiari malformation, multiple sclerosis, vertebrobasilar insufficiency, and pharmacological influence. Monothermal caloric irrigations revealed robust and symmetrical responses with normal fixation suppression.         PAST MEDICAL HISTORY:         Diagnosis Date    Atrial fibrillation ([address]     Headache     migraine's since 2003    Hypertension     Kidney mass     Kidney stone     Vertigo         PAST SURGICAL HISTORY:         Procedure Laterality Date    CYST REMOVAL      On top of head    TUBAL LIGATION Bilateral     VENTRICULAR ABLATION SURGERY  08/2019        SOCIAL HISTORY:     Social History     Socioeconomic History    Marital status:      Spouse name: Not on file    Number of children: Not on file    Years of education: Not on file    Highest education level: Not on file   Occupational History    Not on file   Social Needs    Financial resource strain: Not on file    Food insecurity     Worry: Not on file     Inability: Not on file    Transportation needs     Medical: Not on file     Non-medical: Not on file   Tobacco Use    Smoking status: Never Smoker    Smokeless tobacco: Never Used   Substance and Sexual Activity  Alcohol use: Never     Comment: rarely    Drug use: Yes     Types: Marijuana     Comment: for internal tremor    Sexual activity: Yes     Partners: Male   Lifestyle    Physical activity     Days per week: Not on file     Minutes per session: Not on file    Stress: Not on file   Relationships    Social connections     Talks on phone: Not on file     Gets together: Not on file     Attends Oriental orthodox service: Not on file     Active member of club or organization: Not on file     Attends meetings of clubs or organizations: Not on file     Relationship status: Not on file    Intimate partner violence     Fear of current or ex partner: Not on file     Emotionally abused: Not on file     Physically abused: Not on file     Forced sexual activity: Not on file   Other Topics Concern    Not on file   Social History Narrative    Not on file       CURRENT MEDICATIONS:     Current Outpatient Medications   Medication Sig Dispense Refill    DULoxetine (CYMBALTA) 60 MG extended release capsule       busPIRone (BUSPAR) 5 MG tablet Take 1 tablet by mouth twice daily 60 tablet 0    topiramate (TOPAMAX) 100 MG tablet Take 1 tablet by mouth 2 times daily 60 tablet 5    rizatriptan (MAXALT) 10 MG tablet Take 1 tablet by mouth once as needed for Migraine May repeat in 2 hours if needed 12 tablet 5    propranolol (INDERAL LA) 60 MG extended release capsule Take 1 capsule by mouth daily 30 capsule 5    ramipril (ALTACE) 2.5 MG capsule Take 2.5 mg by mouth daily      potassium chloride (KLOR-CON M) 20 MEQ extended release tablet Take 1 tablet by mouth 3 times daily (Patient taking differently: Take 20 mEq by mouth 2 times daily ) 90 tablet 1    levothyroxine (SYNTHROID) 75 MCG tablet Take 75 mcg by mouth Daily       dabigatran (PRADAXA) 150 MG capsule Take 150 mg by mouth 2 times daily  albuterol sulfate HFA (PROVENTIL HFA) 108 (90 BASE) MCG/ACT inhaler Inhale 2 puffs into the lungs every 4 hours as needed for Wheezing or Shortness of Breath (Space out to every 6 hours as symptoms improve) Space out to every 6 hours as symptoms improve. 1 Inhaler 0     No current facility-administered medications for this visit. ALLERGIES:     Allergies   Allergen Reactions    Aspartame And Phenylalanine     Penicillins Diarrhea                                 REVIEW OF SYSTEMS        All items selected indicate a positive finding. Those items not selected are negative. Constitutional [] Weight loss/gain   [] Fatigue  [] Fever/Chills   HEENT [] Hearing Loss  [] Visual Disturbance  [] Tinnitus  [] Eye pain   Respiratory [] Shortness of Breath  [] Cough  [] Snoring   Cardiovascular [] Chest Pain  [] Palpitations  [] Lightheaded   GI [] Constipation  [] Diarrhea  [] Swallowing change  [] Nausea/vomiting    [] Urinary Frequency  [] Urinary Urgency   Musculoskeletal [] Neck pain  [] Back pain  [] Muscle pain  [] Restless legs   Dermatologic [] Skin changes   Neurologic [] Memory loss/confusion  [] Seizures  [] Trouble walking or imbalance  [x] Dizziness  [] Sleep disturbance  [] Weakness  [] Numbness  [x] Tremors  [] Speech Difficulty  [x] Headaches  [x] Light Sensitivity  [x] Sound Sensitivity   Endocrinology []Excessive thirst  []Excessive hunger   Psychiatric [] Anxiety/Depression  [] Hallucination   Allergy/immunology []Hives/environmental allergies   Hematologic/lymph [] Abnormal bleeding  [] Abnormal bruising         PHYSICAL EXAMINATION:       Vitals:    02/04/21 1309   BP: 124/85   Pulse: 59   Temp: 97.1 °F (36.2 °C)                                              .                                                                                                     General Appearance:  Alert, cooperative, no signs of distress, appears stated age   Head:  Normocephalic, no signs of trauma Eyes:  Conjunctiva/corneas clear;  eyelids intact   Ears:  Normal external ear and canals   Nose: Nares normal, mucosa normal, no drainage    Throat: Lips and tongue normal; teeth normal;  gums normal   Neck: Supple, intact flexion, extension and rotation;   trachea midline;  no adenopathy;   thyroid: not enlarged;   no carotid pulse abnormality   Back:   Symmetric, no curvature, ROM adequate   Lungs:   Respirations unlabored   Heart:  Regular rate and rhythm           Extremities: Extremities normal, no cyanosis, no edema   Pulses: Symmetric over head and neck   Skin: Skin color, texture normal, no rashes, no lesions                                     NEUROLOGIC EXAMINATION    Neurologic Exam  Mental status    Alert and oriented x 3; intact memory with no confusion, speech or language problems; no hallucinations or delusions  Fund of information appropriate for level of education    Cranial nerves    II - visual fields intact to confrontation bilaterally  III, IV, VI  extra-ocular muscles full: no pupillary defect; no [place], no nystagmus, no ptosis   V - normal facial sensation                                                               VII - normal facial symmetry                                                             VIII - intact hearing                                                                             IX, X - symmetrical palate                                                                  XI - symmetrical shoulder shrug                                                       XII - tongue midline without atrophy or fasciculation      Motor function  Normal muscle bulk and tone; strength 5/5 on all 4 extremities, no pronator drift      Sensory function Intact to light touch, pinprick, vibration, proprioception on all 4 extremities      Cerebellar Intact fine motor movement. No involuntary movements or tremors.  No ataxia or dysmetria on finger to nose or heel to shin testing Reflex function DTR 2+ on bilateral UE and LE, symmetric. Negative Babinski      Gait                   normal base and arm swing                  Medical Decision Making: In summary, your patient, Mat Lovett exhibits the following, with associated plan:    1. Migraine headaches, currently well controlled  1. Continue Topamax 100 mg twice daily  2. Continue Maxalt MLT 10 mg daily  2. Vertigo which has improved significantly  3. Episodes of lightheadedness which can be multifactorial.  Differential diagnoses include cardiac arrhythmia, neurocardiogenic syncope, or panic attacks associated with her anxiety  1. Continue BuSpar 5 mg twice daily  2. Continue to drink 60 ounces of water daily in addition to 8 ounces of Gatorade and patient was also advised to wear compression stockings  3. Patient was also advised to continue to take her blood pressure and pulse when one of these events occur  4. The patient will be seen back in 6 months  4. Essential tremor  1. Continue propranolol 60 mg daily          Signed: Eloise Garcia CNP      *Please note that portions of this note were completed with a voice recognition program.  Although every effort was made to insure the accuracy of this automated transcription, some errors in transcription may have occurred, occasionally words and are mis-transcribed    Provider Attestation: The documentation recorded by the scribe accurately reflects the service I personally performed and the decisions made by myself. Portions of this note were transcribed by a scribe. I personally performed the history, physical exam, and medical decision-making and confirm the accuracy of the information in the transcribed note. Scribe Attestation:   By signing my name below, Aman Doll, attest that this documentation has been prepared under the direction and in the presence of Eloise Garcia CNP. negative...

## 2021-03-30 RX ORDER — TOPIRAMATE 100 MG/1
TABLET, FILM COATED ORAL
Qty: 60 TABLET | Refills: 5 | Status: SHIPPED | OUTPATIENT
Start: 2021-03-30 | End: 2021-08-10 | Stop reason: SDUPTHER

## 2021-03-30 NOTE — TELEPHONE ENCOUNTER
Pharmacy requesting refill of Topamax 100 mg. Medication active on med list yes      Date last ordered: 10/29/2020  verified on 3/30/2021  verified by CHASTITY Thomas LPN      Date of last appointment 2/4/2021    Next Visit Date:  8/10/2021

## 2021-08-10 ENCOUNTER — OFFICE VISIT (OUTPATIENT)
Dept: NEUROLOGY | Age: 61
End: 2021-08-10
Payer: MEDICARE

## 2021-08-10 VITALS
DIASTOLIC BLOOD PRESSURE: 80 MMHG | SYSTOLIC BLOOD PRESSURE: 130 MMHG | HEART RATE: 72 BPM | TEMPERATURE: 97.2 F | HEIGHT: 68 IN | BODY MASS INDEX: 27.89 KG/M2 | WEIGHT: 184 LBS

## 2021-08-10 DIAGNOSIS — F41.9 ANXIETY: Primary | ICD-10-CM

## 2021-08-10 DIAGNOSIS — G25.0 TREMOR, ESSENTIAL: ICD-10-CM

## 2021-08-10 DIAGNOSIS — G43.009 MIGRAINE WITHOUT AURA AND WITHOUT STATUS MIGRAINOSUS, NOT INTRACTABLE: ICD-10-CM

## 2021-08-10 DIAGNOSIS — R55 SYNCOPE AND COLLAPSE: ICD-10-CM

## 2021-08-10 DIAGNOSIS — R42 VERTIGO: ICD-10-CM

## 2021-08-10 PROCEDURE — 3017F COLORECTAL CA SCREEN DOC REV: CPT | Performed by: NURSE PRACTITIONER

## 2021-08-10 PROCEDURE — 99214 OFFICE O/P EST MOD 30 MIN: CPT | Performed by: NURSE PRACTITIONER

## 2021-08-10 PROCEDURE — G8427 DOCREV CUR MEDS BY ELIG CLIN: HCPCS | Performed by: NURSE PRACTITIONER

## 2021-08-10 PROCEDURE — 1036F TOBACCO NON-USER: CPT | Performed by: NURSE PRACTITIONER

## 2021-08-10 PROCEDURE — G8419 CALC BMI OUT NRM PARAM NOF/U: HCPCS | Performed by: NURSE PRACTITIONER

## 2021-08-10 RX ORDER — TOPIRAMATE 100 MG/1
TABLET, FILM COATED ORAL
Qty: 60 TABLET | Refills: 11 | Status: SHIPPED | OUTPATIENT
Start: 2021-08-10 | End: 2022-07-20 | Stop reason: SDUPTHER

## 2021-08-10 RX ORDER — MECLIZINE HCL 12.5 MG/1
12.5 TABLET ORAL 3 TIMES DAILY PRN
Qty: 90 TABLET | Refills: 2 | Status: SHIPPED | OUTPATIENT
Start: 2021-08-10 | End: 2021-09-09

## 2021-08-10 RX ORDER — BUSPIRONE HYDROCHLORIDE 5 MG/1
5 TABLET ORAL 2 TIMES DAILY
Qty: 60 TABLET | Refills: 11 | Status: SHIPPED | OUTPATIENT
Start: 2021-08-10 | End: 2022-08-10 | Stop reason: SDUPTHER

## 2021-08-10 RX ORDER — PROPRANOLOL HCL 60 MG
60 CAPSULE, EXTENDED RELEASE 24HR ORAL DAILY
Qty: 30 CAPSULE | Refills: 11 | Status: SHIPPED | OUTPATIENT
Start: 2021-08-10 | End: 2022-08-10 | Stop reason: SDUPTHER

## 2021-08-10 RX ORDER — RIZATRIPTAN BENZOATE 10 MG/1
10 TABLET ORAL
Qty: 12 TABLET | Refills: 11 | Status: SHIPPED | OUTPATIENT
Start: 2021-08-10 | End: 2022-02-10

## 2021-08-10 NOTE — PROGRESS NOTES
daily. She reports compliance with propranolol and it has been effective in limiting her tremor. The patient also has vertigo which was improved significantly at her last visit. She notes that she has been having increased vertigo prior to today's visit. It has been present for approximately one week and notes that it is present when she moves her head. She has been referred to balance and mobility therapy previously but never attended. She has previously been prescribed meclizine and notes that it was effective in managing her vertigo. She notices the vertigo as early as when she sits up in bed in the morning. Testing reviewed:    CT Head 06/09/2020  Impression    No acute intracranial abnormality.               MRI brain IAC 1/23/20  IMPRESSION:  Normal cerebral MRI with and without contrast intracranially, with attention to the IACs. Mild mucosal thickening redemonstrated in the left maxillary sinus.     MRI cervical spine 1/23/20  Impression:  *  Acquired chronic findings are appreciated.  These result in multilevel foraminal stenosis.  Central canal stenosis is appreciated at C4-5.  Please correlate with the clinical picture. CTA head 10/17/19 - normal  VNG 11/27/19    IMPRESSION: ABNORMAL VNG. Oculomotor testing revealed no spontaneous or gaze-evoked nystagmus. Smooth pursuit testing revealed normal gain and symmetry. Saccadic velocity, accuracy, and latencies were normal. OPK testing revealed normal gain in both directions. Post headshake testing was normal and did not elicit any clinically significant nystagmus. Nura-Hallpike was negative for torsional nystagmus, bilaterally, which contraindicates Benign Paroxysmal Positional Vertigo (BPPV). Positional testing revealed significant upbeating positional nystagmus in every position. Patient was symptomatic during Nura-Hallpike Left and Head Left postionals.  Overall, a vertical nystagmus present during positional testing has been associated with damage to the cerebellum, Arnold-Chiari malformation, multiple sclerosis, vertebrobasilar insufficiency, and pharmacological influence. Monothermal caloric irrigations revealed robust and symmetrical responses with normal fixation suppression. PAST MEDICAL HISTORY:         Diagnosis Date    Atrial fibrillation ([address]     Headache     migraine's since 2003    Hypertension     Kidney mass     Kidney stone     Vertigo         PAST SURGICAL HISTORY:         Procedure Laterality Date    CYST REMOVAL      On top of head    TUBAL LIGATION Bilateral     VENTRICULAR ABLATION SURGERY  08/2019        SOCIAL HISTORY:     Social History     Socioeconomic History    Marital status:      Spouse name: Not on file    Number of children: Not on file    Years of education: Not on file    Highest education level: Not on file   Occupational History    Not on file   Tobacco Use    Smoking status: Never Smoker    Smokeless tobacco: Never Used   Vaping Use    Vaping Use: Never used   Substance and Sexual Activity    Alcohol use: Never     Comment: rarely    Drug use: Yes     Types: Marijuana     Comment: for internal tremor    Sexual activity: Yes     Partners: Male   Other Topics Concern    Not on file   Social History Narrative    Not on file     Social Determinants of Health     Financial Resource Strain:     Difficulty of Paying Living Expenses:    Food Insecurity:     Worried About Running Out of Food in the Last Year:     Ran Out of Food in the Last Year:    Transportation Needs:     Lack of Transportation (Medical):      Lack of Transportation (Non-Medical):    Physical Activity:     Days of Exercise per Week:     Minutes of Exercise per Session:    Stress:     Feeling of Stress :    Social Connections:     Frequency of Communication with Friends and Family:     Frequency of Social Gatherings with Friends and Family:     Attends Sikh Services:     Active Member of Clubs or Organizations:  Attends Club or Organization Meetings:     Marital Status:    Intimate Partner Violence:     Fear of Current or Ex-Partner:     Emotionally Abused:     Physically Abused:     Sexually Abused:        CURRENT MEDICATIONS:     Current Outpatient Medications   Medication Sig Dispense Refill    busPIRone (BUSPAR) 5 MG tablet Take 1 tablet by mouth 2 times daily 60 tablet 11    propranolol (INDERAL LA) 60 MG extended release capsule Take 1 capsule by mouth daily 30 capsule 11    rizatriptan (MAXALT) 10 MG tablet Take 1 tablet by mouth once as needed for Migraine May repeat in 2 hours if needed 12 tablet 11    topiramate (TOPAMAX) 100 MG tablet Take 1 tablet by mouth twice daily 60 tablet 11    meclizine (ANTIVERT) 12.5 MG tablet Take 1 tablet by mouth 3 times daily as needed for Dizziness 90 tablet 2    DULoxetine (CYMBALTA) 60 MG extended release capsule       potassium chloride (KLOR-CON M) 20 MEQ extended release tablet Take 1 tablet by mouth 3 times daily (Patient taking differently: Take 20 mEq by mouth 2 times daily ) 90 tablet 1    levothyroxine (SYNTHROID) 75 MCG tablet Take 75 mcg by mouth Daily       dabigatran (PRADAXA) 150 MG capsule Take 150 mg by mouth 2 times daily      albuterol sulfate HFA (PROVENTIL HFA) 108 (90 BASE) MCG/ACT inhaler Inhale 2 puffs into the lungs every 4 hours as needed for Wheezing or Shortness of Breath (Space out to every 6 hours as symptoms improve) Space out to every 6 hours as symptoms improve. 1 Inhaler 0    ramipril (ALTACE) 2.5 MG capsule Take 2.5 mg by mouth daily (Patient not taking: Reported on 8/10/2021)       No current facility-administered medications for this visit. ALLERGIES:     Allergies   Allergen Reactions    Aspartame And Phenylalanine     Penicillins Diarrhea                                 REVIEW OF SYSTEMS        All items selected indicate a positive finding. Those items not selected are negative.   Constitutional [] Weight loss/gain   [] Fatigue  [] Fever/Chills   HEENT [] Hearing Loss  [] Visual Disturbance  [] Tinnitus  [] Eye pain   Respiratory [] Shortness of Breath  [] Cough  [] Snoring   Cardiovascular [] Chest Pain  [] Palpitations  [x] Lightheaded   GI [] Constipation  [] Diarrhea  [] Swallowing change  [x] Nausea/vomiting    [] Urinary Frequency  [] Urinary Urgency   Musculoskeletal [] Neck pain  [] Back pain  [] Muscle pain  [] Restless legs   Dermatologic [] Skin changes   Neurologic [] Memory loss/confusion  [] Seizures  [] Trouble walking or imbalance  [x] Dizziness  [] Sleep disturbance  [] Weakness  [] Numbness  [x] Tremors  [] Speech Difficulty  [x] Headaches  [x] Light Sensitivity  [x] Sound Sensitivity   Endocrinology []Excessive thirst  []Excessive hunger   Psychiatric [x] Anxiety/Depression  [] Hallucination   Allergy/immunology []Hives/environmental allergies   Hematologic/lymph [] Abnormal bleeding  [] Abnormal bruising         PHYSICAL EXAMINATION:       Vitals:    08/10/21 1311   BP: 130/80   Pulse: 72   Temp: 97.2 °F (36.2 °C)                                              .                                                                                                     General Appearance:  Alert, cooperative, no signs of distress, appears stated age   Head:  Normocephalic, no signs of trauma   Eyes:  Conjunctiva/corneas clear;  eyelids intact   Ears:  Normal external ear and canals   Nose: Nares normal, mucosa normal, no drainage    Throat: Lips and tongue normal; teeth normal;  gums normal   Neck: Supple, intact flexion, extension and rotation;   trachea midline;  no adenopathy;   thyroid: not enlarged;   no carotid pulse abnormality   Back:   Symmetric, no curvature, ROM adequate   Lungs:   Respirations unlabored   Heart:  Regular rate and rhythm           Extremities: Extremities normal, no cyanosis, no edema   Pulses: Symmetric over head and neck   Skin: Skin color, texture normal, no rashes, no lesions NEUROLOGIC EXAMINATION    Neurologic Exam  Mental status    Alert and oriented x 3; intact memory with no confusion, speech or language problems; no hallucinations or delusions  Fund of information appropriate for level of education    Cranial nerves    II - visual fields intact to confrontation bilaterally  III, IV, VI - extra-ocular muscles full: no pupillary defect; no [place], no nystagmus, no ptosis   V - normal facial sensation                                                               VII - normal facial symmetry                                                             VIII - intact hearing                                                                             IX, X - symmetrical palate                                                                  XI - symmetrical shoulder shrug                                                       XII - tongue midline without atrophy or fasciculation      Motor function  Normal muscle bulk and tone; strength 5/5 on all 4 extremities, no pronator drift      Sensory function Intact to light touch, pinprick, vibration, proprioception on all 4 extremities      Cerebellar Intact fine motor movement. No involuntary movements or tremors. No ataxia or dysmetria on finger to nose or heel to shin testing      Reflex function DTR 2+ on bilateral UE and LE, symmetric. Negative Babinski      Gait                   normal base and arm swing                  Medical Decision Making: In summary, your patient, Michael Aguilera exhibits the following, with associated plan:    1. Migraine headaches, currently well controlled  1. Continue Topamax 100 mg twice daily  2. Continue Maxalt MLT 10 mg daily  2. Vertigo which has returned over the past two weeks  1. Start meclizine 12.5 mg twice daily  2.  If the meclizine does not manage her dizziness she will contact the office for a referral to balance and mobility therapy  3. Episodes of lightheadedness which can be multifactorial.  Differential diagnoses include cardiac arrhythmia, neurocardiogenic syncope, or panic attacks associated with her anxiety  1. Continue BuSpar 5 mg twice daily  2. Continue to drink 60 ounces of water daily in addition to 8 ounces of Gatorade and patient was also advised to wear compression stockings  3. Patient was also advised to continue to take her blood pressure and pulse when one of these events occur  4. The patient will be seen back in 6 months  4. Essential tremor  1. Continue propranolol 60 mg daily            Signed: Saurabh Gutierrez CNP      *Please note that portions of this note were completed with a voice recognition program.  Although every effort was made to insure the accuracy of this automated transcription, some errors in transcription may have occurred, occasionally words and are mis-transcribed    Provider Attestation: The documentation recorded by the scribe accurately reflects the service I personally performed and the decisions made by myself. Portions of this note were transcribed by a scribe. I personally performed the history, physical exam, and medical decision-making and confirm the accuracy of the information in the transcribed note. Scribe Attestation:   By signing my name below, Erick Adair, attest that this documentation has been prepared under the direction and in the presence of Saurabh Gutierrez CNP.

## 2021-12-07 ENCOUNTER — APPOINTMENT (OUTPATIENT)
Dept: GENERAL RADIOLOGY | Age: 61
End: 2021-12-07
Payer: COMMERCIAL

## 2021-12-07 ENCOUNTER — HOSPITAL ENCOUNTER (EMERGENCY)
Age: 61
Discharge: HOME | End: 2021-12-07
Attending: EMERGENCY MEDICINE
Payer: COMMERCIAL

## 2021-12-07 VITALS
TEMPERATURE: 98.6 F | WEIGHT: 172 LBS | SYSTOLIC BLOOD PRESSURE: 149 MMHG | HEART RATE: 77 BPM | BODY MASS INDEX: 26.07 KG/M2 | DIASTOLIC BLOOD PRESSURE: 94 MMHG | HEIGHT: 68 IN | OXYGEN SATURATION: 92 % | RESPIRATION RATE: 17 BRPM

## 2021-12-07 DIAGNOSIS — U07.1 PNEUMONIA DUE TO COVID-19 VIRUS: ICD-10-CM

## 2021-12-07 DIAGNOSIS — J12.82 PNEUMONIA DUE TO COVID-19 VIRUS: ICD-10-CM

## 2021-12-07 DIAGNOSIS — N39.0 ACUTE UTI: Primary | ICD-10-CM

## 2021-12-07 LAB
-: ABNORMAL
ABSOLUTE EOS #: 0.31 K/UL (ref 0–0.4)
ABSOLUTE IMMATURE GRANULOCYTE: ABNORMAL K/UL (ref 0–0.3)
ABSOLUTE LYMPH #: 2.2 K/UL (ref 1–4.8)
ABSOLUTE MONO #: 1.1 K/UL (ref 0.1–0.8)
AMORPHOUS: ABNORMAL
ANION GAP SERPL CALCULATED.3IONS-SCNC: 14 MMOL/L (ref 9–17)
BACTERIA: ABNORMAL
BASOPHILS # BLD: 0 % (ref 0–2)
BASOPHILS ABSOLUTE: 0 K/UL (ref 0–0.2)
BILIRUBIN URINE: NEGATIVE
BUN BLDV-MCNC: 8 MG/DL (ref 8–23)
BUN/CREAT BLD: ABNORMAL (ref 9–20)
CALCIUM SERPL-MCNC: 9.3 MG/DL (ref 8.6–10.4)
CASTS UA: ABNORMAL /LPF
CASTS UA: ABNORMAL /LPF
CHLORIDE BLD-SCNC: 103 MMOL/L (ref 98–107)
CO2: 18 MMOL/L (ref 20–31)
COLOR: ABNORMAL
COMMENT UA: ABNORMAL
CREAT SERPL-MCNC: 0.57 MG/DL (ref 0.5–0.9)
CRYSTALS, UA: ABNORMAL /HPF
DIFFERENTIAL TYPE: ABNORMAL
EOSINOPHILS RELATIVE PERCENT: 2 % (ref 1–4)
EPITHELIAL CELLS UA: ABNORMAL /HPF (ref 0–5)
GFR AFRICAN AMERICAN: >60 ML/MIN
GFR NON-AFRICAN AMERICAN: >60 ML/MIN
GFR SERPL CREATININE-BSD FRML MDRD: ABNORMAL ML/MIN/{1.73_M2}
GFR SERPL CREATININE-BSD FRML MDRD: ABNORMAL ML/MIN/{1.73_M2}
GLUCOSE BLD-MCNC: 136 MG/DL (ref 70–99)
GLUCOSE URINE: NEGATIVE
HCT VFR BLD CALC: 45.9 % (ref 36–46)
HEMOGLOBIN: 15.2 G/DL (ref 12–16)
IMMATURE GRANULOCYTES: ABNORMAL %
KETONES, URINE: NEGATIVE
LEUKOCYTE ESTERASE, URINE: ABNORMAL
LYMPHOCYTES # BLD: 14 % (ref 24–44)
MCH RBC QN AUTO: 29.9 PG (ref 26–34)
MCHC RBC AUTO-ENTMCNC: 33.1 G/DL (ref 31–37)
MCV RBC AUTO: 90.2 FL (ref 80–100)
MONOCYTES # BLD: 7 % (ref 1–7)
MORPHOLOGY: NORMAL
MUCUS: ABNORMAL
NITRITE, URINE: NEGATIVE
NRBC AUTOMATED: ABNORMAL PER 100 WBC
OTHER OBSERVATIONS UA: ABNORMAL
PDW BLD-RTO: 13.9 % (ref 12.5–15.4)
PH UA: 6 (ref 5–8)
PLATELET # BLD: 721 K/UL (ref 140–450)
PLATELET ESTIMATE: ABNORMAL
PMV BLD AUTO: 9.6 FL (ref 8–14)
POTASSIUM SERPL-SCNC: 4.2 MMOL/L (ref 3.7–5.3)
PROTEIN UA: NEGATIVE
RBC # BLD: 5.09 M/UL (ref 4–5.2)
RBC # BLD: ABNORMAL 10*6/UL
RBC UA: ABNORMAL /HPF (ref 0–2)
RENAL EPITHELIAL, UA: ABNORMAL /HPF
SEG NEUTROPHILS: 77 % (ref 36–66)
SEGMENTED NEUTROPHILS ABSOLUTE COUNT: 12.09 K/UL (ref 1.8–7.7)
SODIUM BLD-SCNC: 135 MMOL/L (ref 135–144)
SPECIFIC GRAVITY UA: 1.02 (ref 1–1.03)
TRICHOMONAS: ABNORMAL
TROPONIN INTERP: NORMAL
TROPONIN T: NORMAL NG/ML
TROPONIN, HIGH SENSITIVITY: 13 NG/L (ref 0–14)
TURBIDITY: CLEAR
URINE HGB: ABNORMAL
UROBILINOGEN, URINE: ABNORMAL
WBC # BLD: 15.7 K/UL (ref 3.5–11)
WBC # BLD: ABNORMAL 10*3/UL
WBC UA: ABNORMAL /HPF (ref 0–5)
YEAST: ABNORMAL

## 2021-12-07 PROCEDURE — 2580000003 HC RX 258: Performed by: EMERGENCY MEDICINE

## 2021-12-07 PROCEDURE — 84484 ASSAY OF TROPONIN QUANT: CPT

## 2021-12-07 PROCEDURE — 96360 HYDRATION IV INFUSION INIT: CPT

## 2021-12-07 PROCEDURE — 80048 BASIC METABOLIC PNL TOTAL CA: CPT

## 2021-12-07 PROCEDURE — 81001 URINALYSIS AUTO W/SCOPE: CPT

## 2021-12-07 PROCEDURE — 6370000000 HC RX 637 (ALT 250 FOR IP): Performed by: EMERGENCY MEDICINE

## 2021-12-07 PROCEDURE — 85025 COMPLETE CBC W/AUTO DIFF WBC: CPT

## 2021-12-07 PROCEDURE — 87086 URINE CULTURE/COLONY COUNT: CPT

## 2021-12-07 PROCEDURE — 93005 ELECTROCARDIOGRAM TRACING: CPT | Performed by: EMERGENCY MEDICINE

## 2021-12-07 PROCEDURE — 71045 X-RAY EXAM CHEST 1 VIEW: CPT

## 2021-12-07 PROCEDURE — 36415 COLL VENOUS BLD VENIPUNCTURE: CPT

## 2021-12-07 PROCEDURE — 99284 EMERGENCY DEPT VISIT MOD MDM: CPT

## 2021-12-07 RX ORDER — PREDNISONE 50 MG/1
50 TABLET ORAL DAILY
Qty: 4 TABLET | Refills: 0 | Status: SHIPPED | OUTPATIENT
Start: 2021-12-07 | End: 2021-12-11

## 2021-12-07 RX ORDER — SULFAMETHOXAZOLE AND TRIMETHOPRIM 800; 160 MG/1; MG/1
1 TABLET ORAL 2 TIMES DAILY
Qty: 14 TABLET | Refills: 0 | Status: SHIPPED | OUTPATIENT
Start: 2021-12-07 | End: 2021-12-14

## 2021-12-07 RX ORDER — PREDNISONE 20 MG/1
60 TABLET ORAL ONCE
Status: COMPLETED | OUTPATIENT
Start: 2021-12-07 | End: 2021-12-07

## 2021-12-07 RX ORDER — SULFAMETHOXAZOLE AND TRIMETHOPRIM 800; 160 MG/1; MG/1
1 TABLET ORAL ONCE
Status: COMPLETED | OUTPATIENT
Start: 2021-12-07 | End: 2021-12-07

## 2021-12-07 RX ORDER — 0.9 % SODIUM CHLORIDE 0.9 %
500 INTRAVENOUS SOLUTION INTRAVENOUS ONCE
Status: COMPLETED | OUTPATIENT
Start: 2021-12-07 | End: 2021-12-07

## 2021-12-07 RX ADMIN — SULFAMETHOXAZOLE AND TRIMETHOPRIM 1 TABLET: 800; 160 TABLET ORAL at 03:18

## 2021-12-07 RX ADMIN — SODIUM CHLORIDE 500 ML: 9 INJECTION, SOLUTION INTRAVENOUS at 02:18

## 2021-12-07 RX ADMIN — PREDNISONE 60 MG: 20 TABLET ORAL at 03:18

## 2021-12-07 ASSESSMENT — PAIN DESCRIPTION - LOCATION: LOCATION: CHEST

## 2021-12-07 ASSESSMENT — PAIN SCALES - GENERAL: PAINLEVEL_OUTOF10: 2

## 2021-12-07 NOTE — ED PROVIDER NOTES
[id number] [facility] ED  [address] [facility] [address]  Phone: [phone]  Fax: Demi Lipscomb [address]      Pt Name: Aislinn Alfaro  MRN: [id number]  [place] 1960  Date of evaluation: 12/7/2021    CHIEF COMPLAINT       Chief Complaint   Patient presents with    Generalized Body Aches     tested positive for covid 11/11/2021    Fatigue       HISTORY OF PRESENT ILLNESS    Aislinn Alfaro is a 57 y.o. female who presents to the emergency room complaining of generalized fatigue and dehydration since the week of Thanksgiving. Diagnosed with Covid about a month ago. Denies any chest pain shortness of breath admits to a chronic cough. No body aches. Fever at home none currently. Denies any other complaints. Primarily complaining of fatigue. REVIEW OF SYSTEMS       Constitutional: Positive fevers no chills chills positive body aches and fatigue  HEENT: No sore throat, rhinorrhea, or earache   Eyes: No blurry vision or double vision no drainage   Cardiovascular: No chest pain or tachycardia   Respiratory: No wheezing or shortness of breath positive dry cough   Gastrointestinal: No nausea, vomiting, diarrhea, constipation, or abdominal pain   : No hematuria or dysuria   Musculoskeletal: No swelling or pain   Skin: No rash   Neurological: No focal neurologic complaints, paresthesias, weakness, or headache     PAST MEDICAL HISTORY    has a past medical history of Atrial fibrillation ([address] Headache, Hypertension, Kidney mass, Kidney stone, and Vertigo. SURGICAL HISTORY      has a past surgical history that includes Tubal ligation (Bilateral); cyst removal; and Ventricular ablation surgery (08/2019).     CURRENT MEDICATIONS       Previous Medications    ALBUTEROL SULFATE HFA (PROVENTIL HFA) 108 (90 BASE) MCG/ACT INHALER    Inhale 2 puffs into the lungs every 4 hours as needed for Wheezing or Shortness of Breath (Space out to every 6 hours as symptoms improve) Space out to every 6 hours as symptoms improve. BUSPIRONE (BUSPAR) 5 MG TABLET    Take 1 tablet by mouth 2 times daily    DABIGATRAN (PRADAXA) 150 MG CAPSULE    Take 150 mg by mouth 2 times daily    DULOXETINE (CYMBALTA) 60 MG EXTENDED RELEASE CAPSULE        LEVOTHYROXINE (SYNTHROID) 75 MCG TABLET    Take 75 mcg by mouth Daily     POTASSIUM CHLORIDE (KLOR-CON M) 20 MEQ EXTENDED RELEASE TABLET    Take 1 tablet by mouth 3 times daily    PROPRANOLOL (INDERAL LA) 60 MG EXTENDED RELEASE CAPSULE    Take 1 capsule by mouth daily    RAMIPRIL (ALTACE) 2.5 MG CAPSULE    Take 2.5 mg by mouth daily     RIZATRIPTAN (MAXALT) 10 MG TABLET    Take 1 tablet by mouth once as needed for Migraine May repeat in 2 hours if needed    TOPIRAMATE (TOPAMAX) 100 MG TABLET    Take 1 tablet by mouth twice daily       ALLERGIES     is allergic to aspartame and phenylalanine and penicillins. FAMILY HISTORY     She indicated that her mother is alive. She indicated that her father is . She indicated that all of her three sisters are alive. She indicated that all of her four brothers are alive. She indicated that the status of her maternal grandmother is unknown. She indicated that the status of her paternal grandfather is unknown.     family history includes Cancer in her maternal grandmother and sister; Diabetes type 2  in her mother; Heart Disease in her sister; Hypertension in her mother and paternal grandfather; Migraines in her sister; Other (age of onset: 27) in her father. SOCIAL HISTORY      reports that she has never smoked. She has never used smokeless tobacco. She reports current drug use. Drug: Marijuana [place]anna). She reports that she does not drink alcohol.     PHYSICAL EXAM       ED Triage Vitals [21 0131]   BP Temp Temp Source Pulse Resp SpO2 Height Weight   (!) 149/94 -- Oral 77 17 92 % 5' 8\" (1.727 m) 172 lb (78 kg)     Constitutional: Alert, oriented x3, nontoxic, answering questions appropriately, acting properly for age, in no acute distress   HEENT: Extraocular muscles intact, mucus membranes moist,  no posterior pharyngeal erythema or exudates, Pupils equal, round, reactive to light,   Neck: Trachea midline   Cardiovascular: Regular rhythm and rate no murmurs   Respiratory: Clear to auscultation bilaterally no wheezes, rhonchi, rales, no respiratory distress no tachypnea no retractions no hypoxia  Gastrointestinal: Soft, nontender, nondistended, positive bowel sounds. No rebound, rigidity, or guarding. Musculoskeletal: No extremity pain or swelling no calf tenderness or asymmetry  Neurologic: Moving all 4 extremities without difficulty there are no gross focal neurologic deficits   Skin: Warm and dry     DIFFERENTIAL DIAGNOSIS/ MDM:     IV fluids and labs. EKG. DIAGNOSTIC RESULTS     EKG: All EKG's are interpreted by the Emergency Department Physician who either signs or Co-signs this chart in the absence of a cardiologist.    135 sinus rhythm rate 73  QRS 82  no acute ST or T wave changes.     Not indicated unless otherwise documented above    LABS:  Results for orders placed or performed during the hospital encounter of 12/07/21   CBC Auto Differential   Result Value Ref Range    WBC 15.7 (H) 3.5 - 11.0 k/uL    RBC 5.09 4.0 - 5.2 m/uL    Hemoglobin 15.2 12.0 - 16.0 g/dL    Hematocrit 45.9 36 - 46 %    MCV 90.2 80 - 100 fL    MCH 29.9 26 - 34 pg    MCHC 33.1 31 - 37 g/dL    RDW 13.9 12.5 - 15.4 %    Platelets 189 (H) 624 - 450 k/uL    MPV 9.6 8.0 - 14.0 fL    NRBC Automated NOT REPORTED per 100 WBC    Differential Type NOT REPORTED     Immature Granulocytes NOT REPORTED 0 %    Absolute Immature Granulocyte NOT REPORTED 0.00 - 0.30 k/uL    WBC Morphology NOT REPORTED     RBC Morphology NOT REPORTED     Platelet Estimate NOT REPORTED     Seg Neutrophils 77 (H) 36 - 66 %    Lymphocytes 14 (L) 24 - 44 %    Monocytes 7 1 - 7 %    Eosinophils % 2 1 - 4 %    Basophils 0 0 - 2 %    Segs Absolute 12.09 (H) 1.8 - 7.7 k/uL Absolute Lymph # 2.20 1.0 - 4.8 k/uL    Absolute Mono # 1.10 (H) 0.1 - 0.8 k/uL    Absolute Eos # 0.31 0.0 - 0.4 k/uL    Basophils Absolute 0.00 0.0 - 0.2 k/uL    Morphology Normal    Basic Metabolic Panel   Result Value Ref Range    Glucose 136 (H) 70 - 99 mg/dL    BUN 8 8 - 23 mg/dL    CREATININE 0.57 0.50 - 0.90 mg/dL    Bun/Cre Ratio NOT REPORTED 9 - 20    Calcium 9.3 8.6 - 10.4 mg/dL    Sodium 135 135 - 144 mmol/L    Potassium 4.2 3.7 - 5.3 mmol/L    Chloride 103 98 - 107 mmol/L    CO2 18 (L) 20 - 31 mmol/L    Anion Gap 14 9 - 17 mmol/L    GFR Non-African American >60 >60 mL/min    GFR African American >60 >60 mL/min    GFR Comment          GFR Staging NOT REPORTED    Troponin   Result Value Ref Range    Troponin, High Sensitivity 13 0 - 14 ng/L    Troponin T NOT REPORTED <0.03 ng/mL    Troponin Interp NOT REPORTED    Urinalysis Reflex to Culture    Specimen: Urine, clean catch   Result Value Ref Range    Color, UA Straw (A) Yellow    Turbidity UA Clear Clear    Glucose, Ur NEGATIVE NEGATIVE    Bilirubin Urine NEGATIVE NEGATIVE    Ketones, Urine NEGATIVE NEGATIVE    Specific Gravity, UA 1.020 1.005 - 1.030    Urine Hgb TRACE (A) NEGATIVE    pH, UA 6.0 5.0 - 8.0    Protein, UA NEGATIVE NEGATIVE    Urobilinogen, Urine ELEVATED (A) Normal    Nitrite, Urine NEGATIVE NEGATIVE    Leukocyte Esterase, Urine TRACE (A) NEGATIVE    Urinalysis Comments NOT REPORTED    Microscopic Urinalysis   Result Value Ref Range    -          WBC, UA 10 TO 20 0 - 5 /HPF    RBC, UA 5 TO 10 0 - 2 /HPF    Casts UA 2 TO 5 /LPF    Casts UA HYALINE /LPF    Crystals, UA NOT REPORTED None /HPF    Epithelial Cells UA 2 TO 5 0 - 5 /HPF    Renal Epithelial, UA NOT REPORTED 0 /HPF    Bacteria, UA FEW (A) None    Mucus, UA 3+ (A) None    Trichomonas, UA NOT REPORTED None    Amorphous, UA 1+ (A) None    Other Observations UA Culture ordered based on defined criteria. (A) NOT REQ.     Yeast, UA NOT REPORTED None       Not indicated unless otherwise documented above    RADIOLOGY:   I reviewed the radiologist interpretations:    XR CHEST PORTABLE   Final Result   Bilateral infiltrates most pronounced on the left. Findings are compatible   with COVID pneumonia. Not indicated unless otherwise documented above    EMERGENCY DEPARTMENT COURSE:     The patient was given the following medications:  Orders Placed This Encounter   Medications    0.9 % sodium chloride bolus    predniSONE (DELTASONE) tablet 60 mg    sulfamethoxazole-trimethoprim (BACTRIM DS;SEPTRA DS) 800-160 MG per tablet 1 tablet     Order Specific Question:   Antimicrobial Indications     Answer:   Urinary Tract Infection    sulfamethoxazole-trimethoprim (BACTRIM DS) 800-160 MG per tablet     Sig: Take 1 tablet by mouth 2 times daily for 7 days     Dispense:  14 tablet     Refill:  0    predniSONE (DELTASONE) 50 MG tablet     Sig: Take 1 tablet by mouth daily for 4 days     Dispense:  4 tablet     Refill:  0        Vitals:   -------------------------  BP (!) 149/94   Pulse 77   Temp 98.6 °F (37 °C) (Oral)   Resp 17   Ht 5' 8\" (1.727 m)   Wt 78 kg (172 lb)   SpO2 92%   BMI 26.15 kg/m²       2:55 AM awaiting urinalysis. Elevated white blood cell count. Chest x-ray shows Covid pneumonia. Diagnosed with Covid a month ago. No hypoxia no tachypnea or retractions she has albuterol at home. Normal electrolytes and kidney function. Troponin normal.  Will likely discharge with antibiotics and steroids with close follow-up following quarantine and return if worsening symptoms or other concerns. The patient understands that at this time there is no evidence for a more malignant underlying process, but also understands that early in the process of an illness or injury, an emergency department workup can be falsely reassuring.   Routine discharge counseling was given, and it is understood that worsening, changing or persistent symptoms should prompt an immediate call or follow up with their primary physician or return to the emergency department. The importance of appropriate follow up was also discussed. I have reviewed the disposition diagnosis. I have answered the questions and given discharge instructions. There was voiced understanding of these instructions and no further questions or complaints. CRITICAL CARE:    None    CONSULTS:    None    PROCEDURES:    None      OARRS Report if indicated             FINAL IMPRESSION      1. Acute UTI    2. Pneumonia due to COVID-19 virus          DISPOSITION/PLAN   DISPOSITION Discharge - Pending Orders Complete 12/07/2021 03:08:11 AM        CONDITION ON DISPOSITION: STABLE       PATIENT REFERRED TO:  No follow-up provider specified.     DISCHARGE MEDICATIONS:  New Prescriptions    PREDNISONE (DELTASONE) 50 MG TABLET    Take 1 tablet by mouth daily for 4 days    SULFAMETHOXAZOLE-TRIMETHOPRIM (BACTRIM DS) 800-160 MG PER TABLET    Take 1 tablet by mouth 2 times daily for 7 days       (Please note that portions of this note were completed with a voice recognition program.  Efforts were made to edit the dictations but occasionally words are mis-transcribed.)    Lin Mercado DO   Attending Emergency Physician      Lin Mercado DO  12/07/21 0309

## 2021-12-08 ENCOUNTER — CARE COORDINATION (OUTPATIENT)
Dept: CARE COORDINATION | Age: 61
End: 2021-12-08

## 2021-12-08 LAB
CULTURE: NORMAL
EKG ATRIAL RATE: 73 BPM
EKG P AXIS: 83 DEGREES
EKG P-R INTERVAL: 150 MS
EKG Q-T INTERVAL: 414 MS
EKG QRS DURATION: 82 MS
EKG QTC CALCULATION (BAZETT): 456 MS
EKG R AXIS: 22 DEGREES
EKG T AXIS: 45 DEGREES
EKG VENTRICULAR RATE: 73 BPM
Lab: NORMAL
SPECIMEN DESCRIPTION: NORMAL

## 2021-12-08 NOTE — CARE COORDINATION
Left VM message asking patient to call me back at [phone] for ED/COVID follow up. This is first attempt.

## 2022-02-10 ENCOUNTER — OFFICE VISIT (OUTPATIENT)
Dept: NEUROLOGY | Age: 62
End: 2022-02-10
Payer: COMMERCIAL

## 2022-02-10 VITALS
HEIGHT: 68 IN | HEART RATE: 62 BPM | DIASTOLIC BLOOD PRESSURE: 83 MMHG | BODY MASS INDEX: 28.64 KG/M2 | WEIGHT: 189 LBS | TEMPERATURE: 96.8 F | SYSTOLIC BLOOD PRESSURE: 128 MMHG

## 2022-02-10 DIAGNOSIS — R42 VERTIGO: ICD-10-CM

## 2022-02-10 DIAGNOSIS — G43.009 MIGRAINE WITHOUT AURA AND WITHOUT STATUS MIGRAINOSUS, NOT INTRACTABLE: ICD-10-CM

## 2022-02-10 DIAGNOSIS — G25.0 TREMOR, ESSENTIAL: Primary | ICD-10-CM

## 2022-02-10 DIAGNOSIS — R55 SYNCOPE AND COLLAPSE: ICD-10-CM

## 2022-02-10 PROCEDURE — 1036F TOBACCO NON-USER: CPT | Performed by: NURSE PRACTITIONER

## 2022-02-10 PROCEDURE — G8419 CALC BMI OUT NRM PARAM NOF/U: HCPCS | Performed by: NURSE PRACTITIONER

## 2022-02-10 PROCEDURE — G8427 DOCREV CUR MEDS BY ELIG CLIN: HCPCS | Performed by: NURSE PRACTITIONER

## 2022-02-10 PROCEDURE — G8484 FLU IMMUNIZE NO ADMIN: HCPCS | Performed by: NURSE PRACTITIONER

## 2022-02-10 PROCEDURE — 3017F COLORECTAL CA SCREEN DOC REV: CPT | Performed by: NURSE PRACTITIONER

## 2022-02-10 PROCEDURE — 99214 OFFICE O/P EST MOD 30 MIN: CPT | Performed by: NURSE PRACTITIONER

## 2022-02-10 RX ORDER — MECLIZINE HCL 12.5 MG/1
TABLET ORAL
COMMUNITY
Start: 2021-12-04

## 2022-02-10 NOTE — PROGRESS NOTES
[facility]            [address]          [facility], [address] [facility]          Dept: [phone]          Dept Fax: [phone]    MD Joi Romero MD Ahmed B. Salome Bores, MD Alford Razor, MD Gerald Spangle, CNP            2/10/2022      HISTORY OF PRESENT ILLNESS:       I had the pleasure of seeing Skeet Cam, who returns for continuing neurologic care. The patient was seen last on August 10, 2021 for treatment of migraine headaches, vertigo, lightheadedness, and essential tremor. Patient states she has migraine headaches which are controlled most of the time with Topamax 100 mg twice daily for prophylaxis treatment and and Maxalt 10 mg for abortive treatment. Patient states recently after bending over a headache starts and some symptoms are returning. Patient states she is missing her bedtime dosage of Topamax. Patient is to take both Topomax 100 mg in the morning to ensure she is getting both dosages. Headache location: Frontal and occipital areas   Headache quality: Pounding, throbbing  Associated factors:  nausea, Photophobia, Phonophobia  Intensity: 8/10  Headache chronicity: Over 10 years  Headache frequency: infrequent, approximately 1/month  Aggravating factors : light, sounds  Relieving factors: Maxalt  Prophylactic medications: Topamax 100 mg  Abortive medications: Maxalt 10 mg  Previously used medications: Topamax    Patient has vertigo and takes meclizine 12.5 mg twice daily. Patient states she is still taking meclizine when she is starting a dizziness spell. Patient has episodes of lightheadedness with differential diagnoses of cardia arrhythmia, neurocardiogenic syncope, or panic attacks associated with her anxiety. Patient takes BuSpar 5 mg twice daily and is to continue to drink 60 ounces of water daily in addition to 8 ounces of Gatorade, and monitor blood pressure and pulse during episodes.   Patient states episodes of lightheadedness have disappeared. Patient has essential tremor and continues to take Propranolol 60 mg daily. Patient states her tremors are randomly getting worse. Testing reviewed:      CT Head 06/09/2020  Impression    No acute intracranial abnormality.               MRI brain IAC 1/23/20  IMPRESSION:  Normal cerebral MRI with and without contrast intracranially, with attention to the IACs. Mild mucosal thickening redemonstrated in the left maxillary sinus.     MRI cervical spine 1/23/20  Impression:  *  Acquired chronic findings are appreciated.  These result in multilevel foraminal stenosis.  Central canal stenosis is appreciated at C4-5.  Please correlate with the clinical picture. CTA head 10/17/19 - normal  VNG 11/27/19    IMPRESSION: ABNORMAL VNG. Oculomotor testing revealed no spontaneous or gaze-evoked nystagmus. Smooth pursuit testing revealed normal gain and symmetry. Saccadic velocity, accuracy, and latencies were normal. OPK testing revealed normal gain in both directions. Post headshake testing was normal and did not elicit any clinically significant nystagmus. Hazen-Hallpike was negative for torsional nystagmus, bilaterally, which contraindicates Benign Paroxysmal Positional Vertigo (BPPV). Positional testing revealed significant upbeating positional nystagmus in every position. Patient was symptomatic during Hazen-Hallpike Left and Head Left postionals. Overall, a vertical nystagmus present during positional testing has been associated with damage to the cerebellum, Arnold-Chiari malformation, multiple sclerosis, vertebrobasilar insufficiency, and pharmacological influence. Monothermal caloric irrigations revealed robust and symmetrical responses with normal fixation suppression.           PAST MEDICAL HISTORY:         Diagnosis Date    Atrial fibrillation ([address]     Headache     migraine's since 2003    Hypertension     Kidney mass     Kidney stone     Vertigo PAST SURGICAL HISTORY:         Procedure Laterality Date    CYST REMOVAL      On top of head    TUBAL LIGATION Bilateral     VENTRICULAR ABLATION SURGERY  08/2019        SOCIAL HISTORY:     Social History     Socioeconomic History    Marital status:      Spouse name: Not on file    Number of children: Not on file    Years of education: Not on file    Highest education level: Not on file   Occupational History    Not on file   Tobacco Use    Smoking status: Never Smoker    Smokeless tobacco: Never Used   Vaping Use    Vaping Use: Never used   Substance and Sexual Activity    Alcohol use: Never     Comment: rarely    Drug use: Yes     Types: Marijuana [place])     Comment: for internal tremor    Sexual activity: Yes     Partners: Male   Other Topics Concern    Not on file   Social History Narrative    Not on file     Social Determinants of Health     Financial Resource Strain:     Difficulty of Paying Living Expenses: Not on file   Food Insecurity:     Worried About Running Out of Food in the Last Year: Not on file    Danitza of Food in the Last Year: Not on file   Transportation Needs:     Lack of Transportation (Medical): Not on file    Lack of Transportation (Non-Medical):  Not on file   Physical Activity:     Days of Exercise per Week: Not on file    Minutes of Exercise per Session: Not on file   Stress:     Feeling of Stress : Not on file   Social Connections:     Frequency of Communication with Friends and Family: Not on file    Frequency of Social Gatherings with Friends and Family: Not on file    Attends Jew Services: Not on file    Active Member of Clubs or Organizations: Not on file    Attends Club or Organization Meetings: Not on file    Marital Status: Not on file   Intimate Partner Violence:     Fear of Current or Ex-Partner: Not on file    Emotionally Abused: Not on file    Physically Abused: Not on file    Sexually Abused: Not on file   Housing Stability:  Unable to Pay for Housing in the Last Year: Not on file    Number of Places Lived in the Last Year: Not on file    Unstable Housing in the Last Year: Not on file       CURRENT MEDICATIONS:     Current Outpatient Medications   Medication Sig Dispense Refill    meclizine (ANTIVERT) 12.5 MG tablet TAKE 1 TABLET BY MOUTH THREE TIMES A DAY AS NEEDED FOR DIZZINESS      busPIRone (BUSPAR) 5 MG tablet Take 1 tablet by mouth 2 times daily 60 tablet 11    propranolol (INDERAL LA) 60 MG extended release capsule Take 1 capsule by mouth daily 30 capsule 11    rizatriptan (MAXALT) 10 MG tablet Take 1 tablet by mouth once as needed for Migraine May repeat in 2 hours if needed 12 tablet 11    topiramate (TOPAMAX) 100 MG tablet Take 1 tablet by mouth twice daily 60 tablet 11    DULoxetine (CYMBALTA) 60 MG extended release capsule       potassium chloride (KLOR-CON M) 20 MEQ extended release tablet Take 1 tablet by mouth 3 times daily (Patient taking differently: Take 20 mEq by mouth 2 times daily ) 90 tablet 1    levothyroxine (SYNTHROID) 75 MCG tablet Take 75 mcg by mouth Daily       dabigatran (PRADAXA) 150 MG capsule Take 150 mg by mouth 2 times daily      albuterol sulfate HFA (PROVENTIL HFA) 108 (90 BASE) MCG/ACT inhaler Inhale 2 puffs into the lungs every 4 hours as needed for Wheezing or Shortness of Breath (Space out to every 6 hours as symptoms improve) Space out to every 6 hours as symptoms improve. 1 Inhaler 0    ramipril (ALTACE) 2.5 MG capsule Take 2.5 mg by mouth daily  (Patient not taking: Reported on 2/10/2022)       No current facility-administered medications for this visit. ALLERGIES:     Allergies   Allergen Reactions    Aspartame And Phenylalanine     Penicillins Diarrhea                                 REVIEW OF SYSTEMS        All items selected indicate a positive finding. Those items not selected are negative.   Constitutional [] Weight loss/gain   [] Fatigue  [] Fever/Chills HEENT [] Hearing Loss  [] Visual Disturbance  [] Tinnitus  [] Eye pain   Respiratory [] Shortness of Breath  [] Cough  [] Snoring   Cardiovascular [] Chest Pain  [] Palpitations  [] Lightheaded   GI [] Constipation  [] Diarrhea  [] Swallowing change  [] Nausea/vomiting    [] Urinary Frequency  [] Urinary Urgency   Musculoskeletal [] Neck pain  [] Back pain  [] Muscle pain  [] Restless legs   Dermatologic [] Skin changes   Neurologic [] Memory loss/confusion  [] Seizures  [] Trouble walking or imbalance  [] Dizziness  [] Sleep disturbance  [] Weakness  [] Numbness  [x] Tremors  [] Speech Difficulty  [x] Headaches  [] Light Sensitivity  [] Sound Sensitivity   Endocrinology []Excessive thirst  []Excessive hunger   Psychiatric [x] Anxiety/Depression  [] Hallucination   Allergy/immunology []Hives/environmental allergies   Hematologic/lymph [] Abnormal bleeding  [] Abnormal bruising         PHYSICAL EXAMINATION:       Vitals:    02/10/22 1309   BP: 128/83   Pulse: 62   Temp: 96.8 °F (36 °C)                                              .                                                                                                     General Appearance:  Alert, cooperative, no signs of distress, appears stated age   Head:  Normocephalic, no signs of trauma   Eyes:  Conjunctiva/corneas clear;  eyelids intact   Ears:  Normal external ear and canals   Nose: Nares normal, mucosa normal, no drainage    Throat: Lips and tongue normal; teeth normal;  gums normal   Neck: Supple, intact flexion, extension and rotation;   trachea midline;  no adenopathy;   thyroid: not enlarged;   no carotid pulse abnormality   Back:   Symmetric, no curvature, ROM adequate   Lungs:   Respirations unlabored   Heart:  Regular rate and rhythm           Extremities: Extremities normal, no cyanosis, no edema   Pulses: Symmetric over head and neck   Skin: Skin color, texture normal, no rashes, no lesions                                     NEUROLOGIC EXAMINATION    Neurologic Exam  Mental status    Alert and oriented x 3; intact memory with no confusion, speech or language problems; no hallucinations or delusions  Fund of information appropriate for level of education    Cranial nerves    II - visual fields intact to confrontation bilaterally  III, IV, VI  extra-ocular muscles full: no pupillary defect; no [place], no nystagmus, no ptosis   V - normal facial sensation                                                               VII - normal facial symmetry                                                             VIII - intact hearing                                                                             IX, X - symmetrical palate                                                                  XI - symmetrical shoulder shrug                                                       XII - tongue midline without atrophy or fasciculation      Motor function  Normal muscle bulk and tone; strength 5/5 on all 4 extremities, no pronator drift      Sensory function Intact to light touch, pinprick, vibration, proprioception on all 4 extremities      Cerebellar Intact fine motor movement. No involuntary movements or tremors. No ataxia or dysmetria on finger to nose or heel to shin testing      Reflex function DTR 2+ on bilateral UE and LE, symmetric. Down going toes bilaterally      Gait                   normal base and arm swing                  Medical Decision Making: In summary, your patient, Erika Anna exhibits the following, with associated plan:      1. Migraine headaches, with a slight increase in frequency  1. The patient is prescribed Topamax 100 mg twice daily, but is missing her nighttime dose. She will start 200 mg once daily in the morning and see whether her headache frequency improves. 2. Continue Maxalt MLT 10 mg daily  2. Vertigo which has returned over the past two weeks  1.  Start meclizine 12.5 mg twice daily  3. Episodes of

## 2022-05-18 ENCOUNTER — OFFICE VISIT (OUTPATIENT)
Dept: OBGYN CLINIC | Age: 62
End: 2022-05-18
Payer: COMMERCIAL

## 2022-05-18 ENCOUNTER — HOSPITAL ENCOUNTER (OUTPATIENT)
Age: 62
Setting detail: SPECIMEN
Discharge: HOME | End: 2022-05-18

## 2022-05-18 VITALS
BODY MASS INDEX: 28.79 KG/M2 | SYSTOLIC BLOOD PRESSURE: 130 MMHG | WEIGHT: 190 LBS | DIASTOLIC BLOOD PRESSURE: 80 MMHG | HEIGHT: 68 IN

## 2022-05-18 DIAGNOSIS — N84.1 CERVICAL POLYP: Primary | ICD-10-CM

## 2022-05-18 PROCEDURE — G8419 CALC BMI OUT NRM PARAM NOF/U: HCPCS | Performed by: OBSTETRICS & GYNECOLOGY

## 2022-05-18 PROCEDURE — 99203 OFFICE O/P NEW LOW 30 MIN: CPT | Performed by: OBSTETRICS & GYNECOLOGY

## 2022-05-18 PROCEDURE — G8427 DOCREV CUR MEDS BY ELIG CLIN: HCPCS | Performed by: OBSTETRICS & GYNECOLOGY

## 2022-05-18 PROCEDURE — 1036F TOBACCO NON-USER: CPT | Performed by: OBSTETRICS & GYNECOLOGY

## 2022-05-18 PROCEDURE — 3017F COLORECTAL CA SCREEN DOC REV: CPT | Performed by: OBSTETRICS & GYNECOLOGY

## 2022-05-20 LAB — SURGICAL PATHOLOGY REPORT: NORMAL

## 2022-05-25 NOTE — PROGRESS NOTES
Madan Arellano  2022    YOB: 1960      HPI:  Madan Arellano is a 60 y.o. female [id number]     Patient states she has noticed for the past few weeks that when she stands up straight or has any type of valsalva, she has a very bright red / beefy appearing large piece of tissue protruding from her vagina. Patient believes she has uterine prolapse and that she might be looking at her cervix, but she is not sure. Patient states she has NO issues with bowel movements, has no issues with urinary incontinence or UTI, she has had NO vaginal bleeding since menopause. Patient had her  evaluate her vaginal introitus this last time the tissue was protruding when she was in the shower, but he said that her labia / vagina look the way it always does and he did not know what she was referring to. Patient looked at her groin again after he evaluated it and it looked normal without the red thick tissue protruding. Patient denies any recent trauma or pain, she does admit that at her last annual exam her PCP told her she had a small piece of tissue or possible early cervical polyp.         OB History    Para Term  AB Living   4 4 4 0 0 4   SAB IAB Ectopic Molar Multiple Live Births   0 0 0 0 0 0      # Outcome Date GA Lbr Fred/2nd Weight Sex Delivery Anes PTL Lv   4 Term      Vag-Spont      3 Term      Vag-Spont      2 Term      Vag-Spont      1 Term      Vag-Spont          Past Medical History:   Diagnosis Date    Atrial fibrillation ([address]     Headache     migraine's since     Hypertension     Kidney mass     Kidney stone     Vertigo        Past Surgical History:   Procedure Laterality Date    CYST REMOVAL      On top of head    TUBAL LIGATION Bilateral     VENTRICULAR ABLATION SURGERY  2019       Family History   Problem Relation Age of Onset    Diabetes type 2  Mother     Hypertension Mother    Connye Sole Other Father 27        pedestrian struck by auto    Cancer Sister     Heart Disease Sister     Migraines Sister     Cancer Maternal Grandmother         lung    Hypertension Paternal Grandfather        Social History     Socioeconomic History    Marital status:      Spouse name: Not on file    Number of children: Not on file    Years of education: Not on file    Highest education level: Not on file   Occupational History    Not on file   Tobacco Use    Smoking status: Never Smoker    Smokeless tobacco: Never Used   Vaping Use    Vaping Use: Never used   Substance and Sexual Activity    Alcohol use: Never     Comment: rarely    Drug use: Yes     Types: Marijuana Myrtis [place])     Comment: for internal tremor    Sexual activity: Yes     Partners: Male   Other Topics Concern    Not on file   Social History Narrative    Not on file     Social Determinants of Health     Financial Resource Strain:     Difficulty of Paying Living Expenses: Not on file   Food Insecurity:     Worried About [address] in the Last Year: Not on file    Danitza of Food in the Last Year: Not on file   Transportation Needs:     Lack of Transportation (Medical): Not on file    Lack of Transportation (Non-Medical):  Not on file   Physical Activity:     Days of Exercise per Week: Not on file    Minutes of Exercise per Session: Not on file   Stress:     Feeling of Stress : Not on file   Social Connections:     Frequency of Communication with Friends and Family: Not on file    Frequency of Social Gatherings with Friends and Family: Not on file    Attends [employer] Services: Not on file    Active Member of Clubs or Organizations: Not on file    Attends Club or Organization Meetings: Not on file    Marital Status: Not on file   Intimate Partner Violence:     Fear of Current or Ex-Partner: Not on file    Emotionally Abused: Not on file    Physically Abused: Not on file    Sexually Abused: Not on file   Housing Stability:     Unable to Pay for Housing in the Last Year: Not on file    Number of Places Lived in the Last Year: Not on file    Unstable Housing in the Last Year: Not on file         MEDICATIONS:  Current Outpatient Medications   Medication Sig Dispense Refill    meclizine (ANTIVERT) 12.5 MG tablet TAKE 1 TABLET BY MOUTH THREE TIMES A DAY AS NEEDED FOR DIZZINESS      busPIRone (BUSPAR) 5 MG tablet Take 1 tablet by mouth 2 times daily 60 tablet 11    propranolol (INDERAL LA) 60 MG extended release capsule Take 1 capsule by mouth daily 30 capsule 11    rizatriptan (MAXALT) 10 MG tablet Take 1 tablet by mouth once as needed for Migraine May repeat in 2 hours if needed 12 tablet 11    topiramate (TOPAMAX) 100 MG tablet Take 1 tablet by mouth twice daily 60 tablet 11    DULoxetine (CYMBALTA) 60 MG extended release capsule       ramipril (ALTACE) 2.5 MG capsule Take 2.5 mg by mouth daily  (Patient not taking: Reported on 2/10/2022)      potassium chloride (KLOR-CON M) 20 MEQ extended release tablet Take 1 tablet by mouth 3 times daily (Patient taking differently: Take 20 mEq by mouth 2 times daily ) 90 tablet 1    levothyroxine (SYNTHROID) 75 MCG tablet Take 75 mcg by mouth Daily       dabigatran (PRADAXA) 150 MG capsule Take 150 mg by mouth 2 times daily      albuterol sulfate HFA (PROVENTIL HFA) 108 (90 BASE) MCG/ACT inhaler Inhale 2 puffs into the lungs every 4 hours as needed for Wheezing or Shortness of Breath (Space out to every 6 hours as symptoms improve) Space out to every 6 hours as symptoms improve. 1 Inhaler 0     No current facility-administered medications for this visit. ALLERGIES:  Allergies as of 05/18/2022 - Fully Reviewed 05/18/2022   Allergen Reaction Noted    Aspartame and phenylalanine  01/14/2018    Penicillins Diarrhea 02/29/2016       Review Of Systems (11 point):  Constitutional: No fever, chills or malaise;  No weight change or fatigue  Head and Eyes: No vision, Headache, Dizziness or trauma in last 12 months  ENT ROS: No hearing, Tinnitis, sinus or taste problems  Hematological and Lymphatic ROS:No Lymphoma, Von Willebrand's, Hemophillia or Bleeding History  Psych ROS: No Depression, Homicidal thoughts,suicidal thoughts, or anxiety  Breast ROS: No prior breast abnormalities or lumps  Respiratory ROS: No SOB, Pneumoniae,Cough, or Pulmonary Embolism History  Cardiovascular ROS: No Chest Pain with Exertion, Palpitations, Syncope, Edema, Arrhythmia  Gastrointestinal ROS: No Indigestion, Heartburn, Nausea, vomiting, Diarrhea, Constipation,or Bowel Changes; No Bloody Stools or melena  Genito-Urinary ROS: No Dysuria, Hematuria or Nocturia. No Urinary Incontinence or Vaginal Discharge, + extra tissue protruding from vagina  Musculoskeletal ROS: No Arthralgia, Arthritis,Gout,Osteoporosis or Rheumatism  Neurological ROS: No CVA, Migraines, Epilepsy, Seizure Hx, or Limb Weakness  Dermatological ROS: No Rash, Itching, Hives, Mole Changes or Cancer          Blood pressure 130/80, height 5' 8\" (1.727 m), weight 190 lb (86.2 kg). Abdomen: Soft non-tender; good bowel sounds. No guarding, rebound or rigidity. No CVA tenderness bilaterally. Extremities: No calf tenderness, DTR 2/4, and No edema bilaterally    Pelvic: Vulva and vagina appear normal. Once speculum placed and cervix isolated, there was a VERY large cervical polyp measuring 1.5X 1.3 cm in size with a very thick stalk visible at the dilated external OS. Bimanual exam reveals normal uterus and adnexa. PROCEDURE IN DETAIL:    Once cervix was cleaned with betadine, the cervical polyp was grasped with a ring forcep and twisted on stalk until the polyp amputated from underlying cervical attachment of endocervical canal.  The area where stalk was had significant amount of bleeding and required vaginal packing initially. Packing was removed after 10 minutes and silver nitrate was used X 8 applications. Stalk site continued to heavy bleed requiring another packing.   Second packing was then removed from vagina and monsel was placed with a pressure pack of martinez swabs for additional 2 minutes. When martinez swabs were removed, another layer of monsel was applied and the area was confirmed to be hemostatic. Patient tolerated procedure well. The cervical polyp was sent to pathology for histologic evaluation. Patient after care instructions discussed and all questions answered. Assessment:   Diagnosis Orders   1. Cervical polyp  Specimen to Pathology Outpatient     Patient Active Problem List    Diagnosis Date Noted    Tremor, essential 07/15/2020    Migraine without aura and without status migrainosus, not intractable 01/28/2020    Hyperventilation syndrome 01/28/2020    Vertigo 01/28/2020    History of cardiac radiofrequency ablation     Elevated plasma metanephrines 07/02/2019    Essential hypertension, benign 06/10/2019    Bradycardia     Hypokalemia     Syncope and collapse 06/09/2019    Renal mass 05/13/2019    H/O thyroid nodule 04/30/2019    Other specified hypothyroidism 04/30/2019    Hypothyroidism 04/30/2019    Microscopic hematuria 05/17/2018    Mixed hyperlipidemia 02/09/2018    Paroxysmal atrial fibrillation ([facility] [address] 02/09/2018    Anxiety 11/18/2016    Depressive disorder 11/18/2016    Hemorrhoids 11/18/2016    Insomnia 11/18/2016    Irregular heart beat 11/18/2016       PLAN:  Cervical polyp removed in office today  Follow up two weeks for bx results  Recommended TVUS if next years pap smear reveals any re-growth of cervical polyp. The one removed today was very superficial and visibility of stalk attachment site was reassuring that entire polyp was removed.   However, if recurrence, patient may need TVUS to evaluate for endometrial polyps as well    Orders Placed This Encounter   Procedures    Specimen to Pathology Outpatient     Order Specific Question:   PREVIOUS BIOPSY     Answer:   No     Order Specific Question:   PREOP DIAGNOSIS     Answer:   cervical polyp     Order Specific Question:   FROZEN SECTION - NO OR YES/SPECIMEN     Answer:    No

## 2022-06-27 RX ORDER — TOPIRAMATE 100 MG/1
TABLET, FILM COATED ORAL
Qty: 60 TABLET | Refills: 10 | OUTPATIENT
Start: 2022-06-27

## 2022-07-20 RX ORDER — TOPIRAMATE 100 MG/1
TABLET, FILM COATED ORAL
Qty: 60 TABLET | Refills: 5 | Status: SHIPPED | OUTPATIENT
Start: 2022-07-20

## 2022-08-10 ENCOUNTER — OFFICE VISIT (OUTPATIENT)
Dept: NEUROLOGY | Age: 62
End: 2022-08-10
Payer: COMMERCIAL

## 2022-08-10 VITALS
BODY MASS INDEX: 29.4 KG/M2 | WEIGHT: 194 LBS | HEIGHT: 68 IN | HEART RATE: 84 BPM | SYSTOLIC BLOOD PRESSURE: 132 MMHG | DIASTOLIC BLOOD PRESSURE: 82 MMHG

## 2022-08-10 DIAGNOSIS — R55 SYNCOPE AND COLLAPSE: ICD-10-CM

## 2022-08-10 DIAGNOSIS — G25.0 TREMOR, ESSENTIAL: ICD-10-CM

## 2022-08-10 DIAGNOSIS — F41.9 ANXIETY: Primary | ICD-10-CM

## 2022-08-10 DIAGNOSIS — G43.009 MIGRAINE WITHOUT AURA AND WITHOUT STATUS MIGRAINOSUS, NOT INTRACTABLE: ICD-10-CM

## 2022-08-10 PROCEDURE — G8427 DOCREV CUR MEDS BY ELIG CLIN: HCPCS | Performed by: NURSE PRACTITIONER

## 2022-08-10 PROCEDURE — 3017F COLORECTAL CA SCREEN DOC REV: CPT | Performed by: NURSE PRACTITIONER

## 2022-08-10 PROCEDURE — 99214 OFFICE O/P EST MOD 30 MIN: CPT | Performed by: NURSE PRACTITIONER

## 2022-08-10 PROCEDURE — G8419 CALC BMI OUT NRM PARAM NOF/U: HCPCS | Performed by: NURSE PRACTITIONER

## 2022-08-10 PROCEDURE — 1036F TOBACCO NON-USER: CPT | Performed by: NURSE PRACTITIONER

## 2022-08-10 RX ORDER — PROPRANOLOL HCL 60 MG
60 CAPSULE, EXTENDED RELEASE 24HR ORAL DAILY
Qty: 30 CAPSULE | Refills: 11 | Status: SHIPPED | OUTPATIENT
Start: 2022-08-10

## 2022-08-10 RX ORDER — BUSPIRONE HYDROCHLORIDE 5 MG/1
5 TABLET ORAL 2 TIMES DAILY
Qty: 60 TABLET | Refills: 11 | Status: SHIPPED | OUTPATIENT
Start: 2022-08-10

## 2022-08-10 RX ORDER — OMEGA-3S/DHA/EPA/FISH OIL/D3 300MG-1000
400 CAPSULE ORAL DAILY
COMMUNITY

## 2022-08-10 NOTE — PROGRESS NOTES
Social History     Socioeconomic History    Marital status:      Spouse name: Not on file    Number of children: Not on file    Years of education: Not on file    Highest education level: Not on file   Occupational History    Not on file   Tobacco Use    Smoking status: Never    Smokeless tobacco: Never   Vaping Use    Vaping Use: Never used   Substance and Sexual Activity    Alcohol use: Never     Comment: rarely    Drug use: Yes     Types: Marijuana L[place] Wanda)     Comment: for internal tremor    Sexual activity: Yes     Partners: Male   Other Topics Concern    Not on file   Social History Narrative    Not on file     Social Determinants of Health     Financial Resource Strain: Not on file   Food Insecurity: Not on file   Transportation Needs: Not on file   Physical Activity: Not on file   Stress: Not on file   Social Connections: Not on file   Intimate Partner Violence: Not on file   Housing Stability: Not on file       CURRENT MEDICATIONS:     Current Outpatient Medications   Medication Sig Dispense Refill    vitamin D3 (CHOLECALCIFEROL) 10 MCG (400 UNIT) TABS tablet Take 400 Units by mouth in the morning. busPIRone (BUSPAR) 5 MG tablet Take 1 tablet by mouth in the morning and 1 tablet before bedtime. 60 tablet 11    propranolol (INDERAL LA) 60 MG extended release capsule Take 1 capsule by mouth in the morning.  30 capsule 11    topiramate (TOPAMAX) 100 MG tablet Take 1 tablet by mouth twice daily 60 tablet 5    meclizine (ANTIVERT) 12.5 MG tablet TAKE 1 TABLET BY MOUTH THREE TIMES A DAY AS NEEDED FOR DIZZINESS      DULoxetine (CYMBALTA) 60 MG extended release capsule       potassium chloride (KLOR-CON M) 20 MEQ extended release tablet Take 1 tablet by mouth 3 times daily (Patient taking differently: Take 20 mEq by mouth in the morning and 20 mEq before bedtime.) 90 tablet 1    levothyroxine (SYNTHROID) 75 MCG tablet Take 75 mcg by mouth Daily       dabigatran (PRADAXA) 150 MG capsule Take 150 mg by 08/10/22 1347   BP: 132/82   Pulse: 84                                              .                                                                                                    General Appearance:  Alert, cooperative, no signs of distress, appears stated age   Head:  Normocephalic, no signs of trauma   Eyes:  Conjunctiva/corneas clear;  eyelids intact   Ears:  Normal external ear and canals   Nose: Nares normal, mucosa normal, no drainage    Throat: Lips and tongue normal; teeth normal;  gums normal   Neck: Supple, intact flexion, extension and rotation;   trachea midline;  no adenopathy;   thyroid: not enlarged;   no carotid pulse abnormality   Back:   Symmetric, no curvature, ROM adequate   Lungs:   Respirations unlabored   Heart:  Regular rate and rhythm           Extremities: Extremities normal, no cyanosis, no edema   Pulses: Symmetric over head and neck   Skin: Skin color, texture normal, no rashes, no lesions                                     NEUROLOGIC EXAMINATION    Neurologic Exam  Mental status    Alert and oriented x 3; intact memory with no confusion, speech or language problems; no hallucinations or delusions  Fund of information appropriate for level of education    Cranial nerves    II - visual fields intact to confrontation bilaterally  III, IV, VI - extra-ocular muscles full: no pupillary defect; no C[place], no nystagmus, no ptosis   V - normal facial sensation                                                               VII - normal facial symmetry                                                             VIII - intact hearing                                                                             IX, X - symmetrical palate                                                                  XI - symmetrical shoulder shrug                                                       XII - tongue midline without atrophy or fasciculation      Motor function  Normal muscle bulk and tone; in the transcribed note. Scribe Attestation:   By signing my name below, Efraín Meza, attest that this documentation has been prepared under the direction and in the presence of Rebecca Boyd CNP.

## 2022-12-12 RX ORDER — TOPIRAMATE 100 MG/1
TABLET, FILM COATED ORAL
Qty: 180 TABLET | Refills: 1 | OUTPATIENT
Start: 2022-12-12

## 2023-02-09 ENCOUNTER — OFFICE VISIT (OUTPATIENT)
Dept: NEUROLOGY | Age: 63
End: 2023-02-09
Payer: COMMERCIAL

## 2023-02-09 VITALS
HEIGHT: 68 IN | WEIGHT: 193 LBS | DIASTOLIC BLOOD PRESSURE: 94 MMHG | BODY MASS INDEX: 29.25 KG/M2 | SYSTOLIC BLOOD PRESSURE: 141 MMHG | HEART RATE: 58 BPM

## 2023-02-09 DIAGNOSIS — R55 SYNCOPE AND COLLAPSE: ICD-10-CM

## 2023-02-09 DIAGNOSIS — G25.0 TREMOR, ESSENTIAL: ICD-10-CM

## 2023-02-09 DIAGNOSIS — R42 VERTIGO: Primary | ICD-10-CM

## 2023-02-09 DIAGNOSIS — G43.009 MIGRAINE WITHOUT AURA AND WITHOUT STATUS MIGRAINOSUS, NOT INTRACTABLE: ICD-10-CM

## 2023-02-09 PROCEDURE — 99214 OFFICE O/P EST MOD 30 MIN: CPT | Performed by: NURSE PRACTITIONER

## 2023-02-09 PROCEDURE — G8419 CALC BMI OUT NRM PARAM NOF/U: HCPCS | Performed by: NURSE PRACTITIONER

## 2023-02-09 PROCEDURE — 3080F DIAST BP >= 90 MM HG: CPT | Performed by: NURSE PRACTITIONER

## 2023-02-09 PROCEDURE — 3077F SYST BP >= 140 MM HG: CPT | Performed by: NURSE PRACTITIONER

## 2023-02-09 PROCEDURE — G8484 FLU IMMUNIZE NO ADMIN: HCPCS | Performed by: NURSE PRACTITIONER

## 2023-02-09 PROCEDURE — 1036F TOBACCO NON-USER: CPT | Performed by: NURSE PRACTITIONER

## 2023-02-09 PROCEDURE — 3017F COLORECTAL CA SCREEN DOC REV: CPT | Performed by: NURSE PRACTITIONER

## 2023-02-09 PROCEDURE — G8427 DOCREV CUR MEDS BY ELIG CLIN: HCPCS | Performed by: NURSE PRACTITIONER

## 2023-02-09 RX ORDER — TOPIRAMATE 100 MG/1
TABLET, FILM COATED ORAL
Qty: 60 TABLET | Refills: 5 | Status: SHIPPED | OUTPATIENT
Start: 2023-02-09

## 2023-02-09 NOTE — PROGRESS NOTES
[facility]            [address]          [place], [address] [facility]          Dept: [phone]          Dept Fax: [phone]    MD Richardson Choi MD Franky Quail, MD Ardith Fergusson, CNP            2/9/2023      HISTORY OF PRESENT ILLNESS:       I had the pleasure of seeing Symone Calvin, who returns for continuing neurologic care. The patient was seen last on August 10, 2022 for treatment of migraine headaches, vertigo, episodes of lightheadedness and essential tremor. For prophylaxis of her headaches she was prescribed topamax 200 mg daily and propranolol LA 60 mg daily. For abortive therapy she was prescribed maxalt. She reports today that her headaches have remained infrequenct. Headache location: Frontal and occipital areas   Headache quality: Pounding, throbbing  Associated factors:  nausea, Photophobia, Phonophobia  Intensity: 8/10  Headache chronicity: Over 10 years  Headache frequency: 1 headache in the past 6 months   Aggravating factors : light, sounds  Relieving factors: Maxalt  Prophylactic medications: Topamax, propranolol   Abortive medications: none  Previously used medications: maxalt     For management of her vertigo she was prescribed meclizine 12.5 mg twice daily. She reports today that she has continued having frequent dizziness. Meclizine is effective in improving her dizziness however it wears off shortly after she takes the medication. She denies any associated sedation. Looking upwards causes worsening of her dizziness. She typically experiences the dizziness approximately 15/30 days. She also has episodes of lightheadedness with differential diagnoses including cardiac arrhythmia, neurocardiogenic syncope, or panic attacks associated with anxiety. For management she was prescribed buspar 5 mg twice daily.  She was also recommended to monitor her blood pressure, drink 60 ounces of water and an 8 ounce gatorade daily and monitor her blood pressure. She denies any recurrence of her lightheaded episodes     For management of her essential tremor she was prescribed propranolol 60 mg daily. Her tremor continues to be well controlled however she still has episodes of worsened tremor. Her propranolol was recently increased to 80 mg daily by her PCP due to her palpitations. Testing reviewed:    CT Head 06/09/2020  Impression   No acute intracranial abnormality. MRI brain IAC 1/23/20  IMPRESSION:  Normal cerebral MRI with and without contrast intracranially, with attention to the IACs. Mild mucosal thickening redemonstrated in the left maxillary sinus. MRI cervical spine 1/23/20  Impression:  *  Acquired chronic findings are appreciated. These result in multilevel foraminal stenosis. Central canal stenosis is appreciated at C4-5. Please correlate with the clinical picture. CTA head 10/17/19 - normal  VNG 11/27/19    IMPRESSION: ABNORMAL VNG. Oculomotor testing revealed no spontaneous or gaze-evoked nystagmus. Smooth pursuit testing revealed normal gain and symmetry. Saccadic velocity, accuracy, and latencies were normal. OPK testing revealed normal gain in both directions. Post headshake testing was normal and did not elicit any clinically significant nystagmus. Nura-Hallpike was negative for torsional nystagmus, bilaterally, which contraindicates Benign Paroxysmal Positional Vertigo (BPPV). Positional testing revealed significant upbeating positional nystagmus in every position. Patient was symptomatic during Commerce-Hallpike Left and Head Left postionals. Overall, a vertical nystagmus present during positional testing has been associated with damage to the cerebellum, Arnold-Chiari malformation, multiple sclerosis, vertebrobasilar insufficiency, and pharmacological influence.  Monothermal caloric irrigations revealed robust and symmetrical responses with normal fixation suppression. PAST MEDICAL HISTORY:         Diagnosis Date    Atrial fibrillation ([facility] [address]     Headache     migraine's since 2003    Hypertension     Kidney mass     Kidney stone     Vertigo         PAST SURGICAL HISTORY:         Procedure Laterality Date    CYST REMOVAL      On top of head    TUBAL LIGATION Bilateral     VENTRICULAR ABLATION SURGERY  08/2019        SOCIAL HISTORY:     Social History     Socioeconomic History    Marital status:      Spouse name: Not on file    Number of children: Not on file    Years of education: Not on file    Highest education level: Not on file   Occupational History    Not on file   Tobacco Use    Smoking status: Never    Smokeless tobacco: Never   Vaping Use    Vaping Use: Never used   Substance and Sexual Activity    Alcohol use: Never     Comment: rarely    Drug use: Yes     Types: Marijuana (Weed)     Comment: for internal tremor    Sexual activity: Yes     Partners: Male   Other Topics Concern    Not on file   Social History Narrative    Not on file     Social Determinants of Health     Financial Resource Strain: Not on file   Food Insecurity: Not on file   Transportation Needs: Not on file   Physical Activity: Not on file   Stress: Not on file   Social Connections: Not on file   Intimate Partner Violence: Not on file   Housing Stability: Not on file       CURRENT MEDICATIONS:     Current Outpatient Medications   Medication Sig Dispense Refill    rivaroxaban (XARELTO) 20 MG TABS tablet Take 20 mg by mouth Daily with supper      vitamin D3 (CHOLECALCIFEROL) 10 MCG (400 UNIT) TABS tablet Take 400 Units by mouth in the morning. busPIRone (BUSPAR) 5 MG tablet Take 1 tablet by mouth in the morning and 1 tablet before bedtime. 60 tablet 11    propranolol (INDERAL LA) 60 MG extended release capsule Take 1 capsule by mouth in the morning.  (Patient taking differently: Take 80 mg by mouth daily) 30 capsule 11    topiramate (TOPAMAX) 100 MG tablet Take 1 tablet by mouth twice daily 60 tablet 5    meclizine (ANTIVERT) 12.5 MG tablet TAKE 1 TABLET BY MOUTH THREE TIMES A DAY AS NEEDED FOR DIZZINESS      rizatriptan (MAXALT) 10 MG tablet Take 1 tablet by mouth once as needed for Migraine May repeat in 2 hours if needed 12 tablet 11    DULoxetine (CYMBALTA) 60 MG extended release capsule       potassium chloride (KLOR-CON M) 20 MEQ extended release tablet Take 1 tablet by mouth 3 times daily (Patient taking differently: Take 20 mEq by mouth 2 times daily) 90 tablet 1    levothyroxine (SYNTHROID) 75 MCG tablet Take 75 mcg by mouth Daily       dabigatran (PRADAXA) 150 MG capsule Take 150 mg by mouth 2 times daily      ramipril (ALTACE) 2.5 MG capsule Take 2.5 mg by mouth daily  (Patient not taking: No sig reported)      albuterol sulfate HFA (PROVENTIL HFA) 108 (90 BASE) MCG/ACT inhaler Inhale 2 puffs into the lungs every 4 hours as needed for Wheezing or Shortness of Breath (Space out to every 6 hours as symptoms improve) Space out to every 6 hours as symptoms improve. (Patient not taking: Reported on 2/9/2023) 1 Inhaler 0     No current facility-administered medications for this visit. ALLERGIES:     Allergies   Allergen Reactions    Aspartame And Phenylalanine     Penicillins Diarrhea                                 REVIEW OF SYSTEMS        All items selected indicate a positive finding. Those items not selected are negative.   Constitutional [] Weight loss/gain   [] Fatigue  [] Fever/Chills   HEENT [] Hearing Loss  [] Visual Disturbance  [] Tinnitus  [] Eye pain   Respiratory [] Shortness of Breath  [] Cough  [] Snoring   Cardiovascular [] Chest Pain  [] Palpitations  [] Lightheaded   GI [] Constipation  [] Diarrhea  [] Swallowing change  [x] Nausea/vomiting    [] Urinary Frequency  [] Urinary Urgency   Musculoskeletal [] Neck pain  [] Back pain  [] Muscle pain  [] Restless legs   Dermatologic [] Skin changes   Neurologic [] Memory loss/confusion  [] Seizures  [] Trouble walking or imbalance  [x] Dizziness  [] Sleep disturbance  [] Weakness  [] Numbness  [] Tremors  [] Speech Difficulty  [x] Headaches  [x] Light Sensitivity  [x] Sound Sensitivity   Endocrinology []Excessive thirst  []Excessive hunger   Psychiatric [] Anxiety/Depression  [] Hallucination   Allergy/immunology []Hives/environmental allergies   Hematologic/lymph [] Abnormal bleeding  [] Abnormal bruising         PHYSICAL EXAMINATION:       Vitals:    02/09/23 1353   BP: (!) 141/94   Pulse: 58                                              .                                                                                                    General Appearance:  Alert, cooperative, no signs of distress, appears stated age   Head:  Normocephalic, no signs of trauma   Eyes:  Conjunctiva/corneas clear;  eyelids intact   Ears:  Normal external ear and canals   Nose: Nares normal, mucosa normal, no drainage    Throat: Lips and tongue normal; teeth normal;  gums normal   Neck: Supple, intact flexion, extension and rotation;   trachea midline;  no adenopathy;   thyroid: not enlarged;   no carotid pulse abnormality   Back:   Symmetric, no curvature, ROM adequate   Lungs:   Respirations unlabored   Heart:  Regular rate and rhythm           Extremities: Extremities normal, no cyanosis, no edema   Pulses: Symmetric over head and neck   Skin: Skin color, texture normal, no rashes, no lesions                                     NEUROLOGIC EXAMINATION    Neurologic Exam  Mental status    Alert and oriented x 3; intact memory with no confusion, speech or language problems; no hallucinations or delusions  Fund of information appropriate for level of education    Cranial nerves    II - visual fields intact to confrontation bilaterally  III, IV, VI - extra-ocular muscles full: no pupillary defect; no [place], no nystagmus, no ptosis   V - normal facial sensation                                                               VII - normal facial symmetry                                                             VIII - intact hearing                                                                             IX, X - symmetrical palate                                                                  XI - symmetrical shoulder shrug                                                       XII - tongue midline without atrophy or fasciculation      Motor function  Normal muscle bulk and tone; strength 5/5 on all 4 extremities, no pronator drift      Sensory function Intact to light touch, pinprick, vibration, proprioception on all 4 extremities      Cerebellar Intact fine motor movement. No involuntary movements or tremors. No ataxia or dysmetria on finger to nose or heel to shin testing      Reflex function DTR 2+ on bilateral UE and LE, symmetric. Down going toes bilaterally      Gait                   normal base and arm swing                  Medical Decision Making: In summary, your patient, Christ Hansen exhibits the following, with associated plan:    Migraine headaches which are currently well controlled   Continue topamax 200 mg daily   Continue propranolol 80 mg daily   Continue maxalt 10 mg for abortive therapy   Vertigo which is worsened when she looks upwards  Continue meclizine 12.5 mg twice daily, recommend patient take 25 mg to see if this better improves her vertigo and contact the office for a new prescription if this is effective.    Obtain CTA of the Head and Neck W contrast   Referral made to balance and mobility therapy today  Episodes of lightheadedness which can be multifactorial.  Differential diagnoses include cardiac arrhythmia, neurocardiogenic syncope, or panic attacks associated with her anxiety  Continue BuSpar 5 mg twice daily  Continue to drink 60 ounces of water daily in addition to 8 ounces of Gatorade and patient was also advised to wear compression stockings  Patient was also advised to continue to take her blood pressure and pulse when one of these events occur  Essential tremor  Continue propranolol 80 mg daily  Return for follow up visit in 3 months             Signed: Gustavo Cunningham CNP      *Please note that portions of this note were completed with a voice recognition program.  Although every effort was made to insure the accuracy of this automated transcription, some errors in transcription may have occurred, occasionally words and are mis-transcribed    Provider Attestation: The documentation recorded by the scribe accurately reflects the service I personally performed and the decisions made by myself. Portions of this note were transcribed by a scribe. I personally performed the history, physical exam, and medical decision-making and confirm the accuracy of the information in the transcribed note. Scribe Attestation:   By signing my name below, Jose Love, attest that this documentation has been prepared under the direction and in the presence of Gustavo Cunningham CNP.

## 2023-02-17 ENCOUNTER — OFFICE VISIT (OUTPATIENT)
Dept: ORTHOPEDIC SURGERY | Age: 63
End: 2023-02-17
Payer: COMMERCIAL

## 2023-02-17 VITALS — HEIGHT: 68 IN | WEIGHT: 193 LBS | BODY MASS INDEX: 29.25 KG/M2 | RESPIRATION RATE: 14 BRPM

## 2023-02-17 DIAGNOSIS — M25.561 CHRONIC PAIN OF RIGHT KNEE: Primary | ICD-10-CM

## 2023-02-17 DIAGNOSIS — G89.29 CHRONIC PAIN OF RIGHT KNEE: Primary | ICD-10-CM

## 2023-02-17 DIAGNOSIS — M76.31 ILIOTIBIAL BAND SYNDROME OF RIGHT SIDE: ICD-10-CM

## 2023-02-17 DIAGNOSIS — M70.61 TROCHANTERIC BURSITIS OF RIGHT HIP: ICD-10-CM

## 2023-02-17 PROCEDURE — 99204 OFFICE O/P NEW MOD 45 MIN: CPT | Performed by: PHYSICIAN ASSISTANT

## 2023-02-17 PROCEDURE — G8419 CALC BMI OUT NRM PARAM NOF/U: HCPCS | Performed by: PHYSICIAN ASSISTANT

## 2023-02-17 PROCEDURE — G8427 DOCREV CUR MEDS BY ELIG CLIN: HCPCS | Performed by: PHYSICIAN ASSISTANT

## 2023-02-17 PROCEDURE — 1036F TOBACCO NON-USER: CPT | Performed by: PHYSICIAN ASSISTANT

## 2023-02-17 PROCEDURE — G8484 FLU IMMUNIZE NO ADMIN: HCPCS | Performed by: PHYSICIAN ASSISTANT

## 2023-02-17 PROCEDURE — 3017F COLORECTAL CA SCREEN DOC REV: CPT | Performed by: PHYSICIAN ASSISTANT

## 2023-02-17 NOTE — PROGRESS NOTES
[facility], [address] [place], [address] [place] [address], [address] [facility]           Dept Phone: [phone]           Dept Fax:  [phone] [facility]           Anai Louie          Dept Phone: [phone]           Dept Fax:  [phone]      Chief Compliant:  Chief Complaint   Patient presents with    Knee Pain     right        History of Present Illness: This is a 60 y.o. female who presents to the clinic today for evaluation of had concerns including Knee Pain (right). Ms. Dagoberto Mcnair is a pleasant 71-year-old female presents for evaluation of chronic right knee and right leg pain. Patient reports she has had multiple injuries most recently had an injury in which the knee gave out at work in 2013 after kneeling on the knee. She states she has had intermittent popping over the lateral aspect of the right knee ever since especially when flexing the knee. She states the pain is not present daily however has become more frequently over the last several months. Again patient localized pain most severely over the lateral aspect with occasional radiation proximally. She denies any radiation pain distal to the knee no numbness or tingling no fever chills. No history of knee surgery. Past History:    Current Outpatient Medications:     rivaroxaban (XARELTO) 20 MG TABS tablet, Take 20 mg by mouth Daily with supper, Disp: , Rfl:     topiramate (TOPAMAX) 100 MG tablet, Take 1 tablet by mouth twice daily, Disp: 60 tablet, Rfl: 5    vitamin D3 (CHOLECALCIFEROL) 10 MCG (400 UNIT) TABS tablet, Take 400 Units by mouth in the morning., Disp: , Rfl:     busPIRone (BUSPAR) 5 MG tablet, Take 1 tablet by mouth in the morning and 1 tablet before bedtime. , Disp: 60 tablet, Rfl: 11    propranolol (INDERAL LA) 60 MG extended release capsule, Take 1 capsule by mouth in the morning. (Patient taking differently: Take 80 mg by mouth daily), Disp: 30 capsule, Rfl: 11    meclizine (ANTIVERT) 12.5 MG tablet, TAKE 1 TABLET BY MOUTH THREE TIMES A DAY AS NEEDED FOR DIZZINESS, Disp: , Rfl:     rizatriptan (MAXALT) 10 MG tablet, Take 1 tablet by mouth once as needed for Migraine May repeat in 2 hours if needed, Disp: 12 tablet, Rfl: 11    ramipril (ALTACE) 2.5 MG capsule, Take 2.5 mg by mouth daily  (Patient not taking: No sig reported), Disp: , Rfl:     potassium chloride (KLOR-CON M) 20 MEQ extended release tablet, Take 1 tablet by mouth 3 times daily (Patient taking differently: Take 20 mEq by mouth 2 times daily), Disp: 90 tablet, Rfl: 1    levothyroxine (SYNTHROID) 75 MCG tablet, Take 75 mcg by mouth Daily , Disp: , Rfl:     dabigatran (PRADAXA) 150 MG capsule, Take 150 mg by mouth 2 times daily, Disp: , Rfl:     albuterol sulfate HFA (PROVENTIL HFA) 108 (90 BASE) MCG/ACT inhaler, Inhale 2 puffs into the lungs every 4 hours as needed for Wheezing or Shortness of Breath (Space out to every 6 hours as symptoms improve) Space out to every 6 hours as symptoms improve.  (Patient not taking: Reported on 2/9/2023), Disp: 1 Inhaler, Rfl: 0  Allergies   Allergen Reactions    Aspartame And Phenylalanine     Penicillins Diarrhea     Social History     Socioeconomic History    Marital status:      Spouse name: Not on file    Number of children: Not on file    Years of education: Not on file    Highest education level: Not on file   Occupational History    Not on file   Tobacco Use    Smoking status: Never    Smokeless tobacco: Never   Vaping Use    Vaping Use: Never used   Substance and Sexual Activity    Alcohol use: Never     Comment: rarely    Drug use: Yes     Types: Marijuana Deadra Axel)     Comment: for internal tremor    Sexual activity: Yes     Partners: Male   Other Topics Concern    Not on file   Social History Narrative    Not on file     Social Determinants of Health     Financial Resource Strain: Not on file   Food Insecurity: Not on file   Transportation Needs: Not on file   Physical Activity: Not on file   Stress: Not on file   Social Connections: Not on file   Intimate Partner Violence: Not on file   Housing Stability: Not on file     Past Medical History:   Diagnosis Date    Atrial fibrillation ([address]     Headache     migraine's since 2003    Hypertension     Kidney mass     Kidney stone     Vertigo      Past Surgical History:   Procedure Laterality Date    CYST REMOVAL      On top of head    TUBAL LIGATION Bilateral     VENTRICULAR ABLATION SURGERY  08/2019     Family History   Problem Relation Age of Onset    Diabetes type 2  Mother     Hypertension Mother     Other Father 27        pedestrian struck by auto    Cancer Sister     Heart Disease Sister     Migraines Sister     Cancer Maternal Grandmother         lung    Hypertension Paternal Grandfather         Review of Systems   Constitutional: Negative for fever, chills, sweats. Eyes: Negative for changes in vision, or pain. HENT: Negative for ear ache, epistaxis, or sore throat. Respiratory/Cardio: Negative for Chest pain, palpitations, SOB, or cough. Gastrointestinal: Negative for abdominal pain, N/V/D. Genitourinary: Negative for dysuria, frequency, urgency, or hematuria. Neurological: Negative for headache, numbness, or weakness. Integumentary: Negative for rash, itching, laceration, or abrasion. Musculoskeletal: Positive for Knee Pain (right)       Physical Exam:  Constitutional: Patient is oriented to person, place, and time. Patient appears well-developed and well nourished. HENT: Negative otherwise noted  Head: Normocephalic and Atraumatic  Nose: Normal  Eyes: Conjunctivae and EOM are normal  Neck: Normal range of motion Neck supple. Respiratory/Cardio: Effort normal. No respiratory distress. Musculoskeletal:    right Hip    Tenderness: Severe tenderness over the right greater trochanter.   Mild tenderness over the proximal IT band. No tenderness to the anterior posterior hip       Range of Motion:      Extension: 10     Flexion: 120     Internal Rotation: 30     External Rotation: 40     Abduction: 40     Adduction:  30       Muscle Strength      Abduction: 5/5     Adduction: 5/5     Flexion: 5/5         Gait: Antalgic   Kanu Test: Negative   Ramin Test: Positive     right Knee    Swelling: None   Effusion: Negative   Tenderness: Lateral joint line and distal IT band. No tenderness the medial joint line, quad or patellar tendon, posterior knee. Range of Motion:      Extension: 0     Flexion: 110       McMurrays: Negative   Anterior Lachmann: Negative   Posterior Lachmann: Negative   Anterior Drawer: Negative   Posterior Drawer: Negative   Varus Stress Test: Negative   Valgus Stress Test: Negative   Pivot Shift: Negative   Patellar Apprehension: No       Neurological: Patient is alert and oriented to person, place, and time. Normal strenght. No sensory deficit. Skin: Skin is warm and dry  Psychiatric: Behavior is normal. Thought content normal.  Nursing note and vitals reviewed. Labs and Imaging:       X-rays taken in clinic today and preliminarily reviewed by me 2/17/23:  AP bilateral knees standing lateral view of the right knee demonstrates mild tricompartmental degenerative changes greatest in the medial compartment. Minimal osteophyte formation. Questionable calcification over the lateral joint line concerning for possible remote injury to the lateral collateral ligament. No evidence of acute fracture or osseous malalignment. Left knee with similar medial compartmental narrowing, mild in nature. Orders Placed This Encounter   Procedures    XR KNEE RIGHT (1-2 VIEWS)     Standing Status:   Future     Number of Occurrences:   1     Standing Expiration Date:   2/17/2024       Assessment and Plan:  1. Chronic pain of right knee    2. Iliotibial band syndrome of right side    3.  Trochanteric bursitis of right hip          PLAN:  Sherrie Cervantes is a 60 y.o. old female with right knee pain/leg pain concerning for IT band syndrome with significant tenderness over the trochanteric bursa consistent with trochanteric bursitis. Radiographs demonstrate calcification over the lateral joint line questionable remote injury to the lateral collateral ligament however no ligamentous instability on examination today. No evidence of septic or gouty arthropathy. 1.  At this time patient will benefit from conservative management in the form of medication management and referral to physical therapy for strengthening and range of motion. 2.  Referral to PT is provided  3. Patient on Xarelto unable to tolerate NSAIDs would recommend proceeding with over the counter topical NSAID in the form of Voltaren gel. 4.  Follow-up in 6 weeks however patient may call it in sooner for any questions or concerns                Electronically signed by CARLEY Dunaway on 2/17/23 at 11:22 AM EST        Please note that this chart was generated using voice recognition Dragon dictation software. Although every effort was made to ensure the accuracy of this automated transcription, some errors in transcription may have occurred.

## 2023-03-30 ENCOUNTER — HOSPITAL ENCOUNTER (OUTPATIENT)
Dept: MAMMOGRAPHY | Age: 63
Discharge: HOME | End: 2023-04-01
Payer: COMMERCIAL

## 2023-03-30 ENCOUNTER — HOSPITAL ENCOUNTER (OUTPATIENT)
Dept: CT IMAGING | Age: 63
Discharge: HOME | End: 2023-04-01
Payer: COMMERCIAL

## 2023-03-30 VITALS — HEIGHT: 68 IN | BODY MASS INDEX: 28.95 KG/M2 | WEIGHT: 191 LBS

## 2023-03-30 DIAGNOSIS — R42 VERTIGO: ICD-10-CM

## 2023-03-30 DIAGNOSIS — Z12.31 ENCOUNTER FOR SCREENING MAMMOGRAM FOR MALIGNANT NEOPLASM OF BREAST: ICD-10-CM

## 2023-03-30 LAB
CREAT SERPL-MCNC: 0.95 MG/DL (ref 0.5–0.9)
GFR SERPL CREATININE-BSD FRML MDRD: >60 ML/MIN/1.73M2

## 2023-03-30 PROCEDURE — 82565 ASSAY OF CREATININE: CPT

## 2023-03-30 PROCEDURE — 2580000003 HC RX 258: Performed by: NURSE PRACTITIONER

## 2023-03-30 PROCEDURE — 70496 CT ANGIOGRAPHY HEAD: CPT

## 2023-03-30 PROCEDURE — 6360000004 HC RX CONTRAST MEDICATION: Performed by: NURSE PRACTITIONER

## 2023-03-30 PROCEDURE — 36415 COLL VENOUS BLD VENIPUNCTURE: CPT

## 2023-03-30 PROCEDURE — 77063 BREAST TOMOSYNTHESIS BI: CPT

## 2023-03-30 RX ORDER — 0.9 % SODIUM CHLORIDE 0.9 %
80 INTRAVENOUS SOLUTION INTRAVENOUS ONCE
Status: COMPLETED | OUTPATIENT
Start: 2023-03-30 | End: 2023-03-30

## 2023-03-30 RX ORDER — SODIUM CHLORIDE 0.9 % (FLUSH) 0.9 %
10 SYRINGE (ML) INJECTION PRN
Status: DISCONTINUED | OUTPATIENT
Start: 2023-03-30 | End: 2023-04-02 | Stop reason: HOSPADM

## 2023-03-30 RX ADMIN — SODIUM CHLORIDE, PRESERVATIVE FREE 10 ML: 5 INJECTION INTRAVENOUS at 13:52

## 2023-03-30 RX ADMIN — IOPAMIDOL 75 ML: 755 INJECTION, SOLUTION INTRAVENOUS at 13:52

## 2023-03-30 RX ADMIN — SODIUM CHLORIDE 80 ML: 9 INJECTION, SOLUTION INTRAVENOUS at 13:52

## 2023-05-25 ENCOUNTER — OFFICE VISIT (OUTPATIENT)
Dept: NEUROLOGY | Age: 63
End: 2023-05-25
Payer: COMMERCIAL

## 2023-05-25 VITALS
HEIGHT: 68 IN | BODY MASS INDEX: 28.98 KG/M2 | HEART RATE: 66 BPM | WEIGHT: 191.2 LBS | DIASTOLIC BLOOD PRESSURE: 75 MMHG | SYSTOLIC BLOOD PRESSURE: 114 MMHG

## 2023-05-25 DIAGNOSIS — R55 SYNCOPE AND COLLAPSE: ICD-10-CM

## 2023-05-25 DIAGNOSIS — G25.0 TREMOR, ESSENTIAL: ICD-10-CM

## 2023-05-25 DIAGNOSIS — F41.9 ANXIETY: Primary | ICD-10-CM

## 2023-05-25 DIAGNOSIS — G43.009 MIGRAINE WITHOUT AURA AND WITHOUT STATUS MIGRAINOSUS, NOT INTRACTABLE: ICD-10-CM

## 2023-05-25 DIAGNOSIS — R42 VERTIGO: ICD-10-CM

## 2023-05-25 PROCEDURE — 1036F TOBACCO NON-USER: CPT | Performed by: NURSE PRACTITIONER

## 2023-05-25 PROCEDURE — 3017F COLORECTAL CA SCREEN DOC REV: CPT | Performed by: NURSE PRACTITIONER

## 2023-05-25 PROCEDURE — G8419 CALC BMI OUT NRM PARAM NOF/U: HCPCS | Performed by: NURSE PRACTITIONER

## 2023-05-25 PROCEDURE — 3074F SYST BP LT 130 MM HG: CPT | Performed by: NURSE PRACTITIONER

## 2023-05-25 PROCEDURE — 3078F DIAST BP <80 MM HG: CPT | Performed by: NURSE PRACTITIONER

## 2023-05-25 PROCEDURE — G8427 DOCREV CUR MEDS BY ELIG CLIN: HCPCS | Performed by: NURSE PRACTITIONER

## 2023-05-25 PROCEDURE — 99214 OFFICE O/P EST MOD 30 MIN: CPT | Performed by: NURSE PRACTITIONER

## 2023-05-25 RX ORDER — DULOXETIN HYDROCHLORIDE 60 MG/1
CAPSULE, DELAYED RELEASE ORAL DAILY
COMMUNITY
Start: 2023-03-09

## 2023-05-25 NOTE — PROGRESS NOTES
[facility]            [address]          [place], [address] [facility]          Dept: [phone]          Dept Fax: [phone]    MD Richardson Diallo MD Ranell Clipper, MD Bertis Maier, CNP            5/25/2023      HISTORY OF PRESENT ILLNESS:       I had the pleasure of seeing Akilah Adhikari, who returns for continuing neurologic care. The patient was seen last on February 9, 2023 for treatment of migraine headaches, vertigo, episodes of lightheadedness and essential tremor. For prophylaxis of her headaches she was prescribed topamax 200 mg daily and propranolol LA 80 mg daily. For abortive therapy she was prescribed maxalt. She reports compliance to propranolol and topamax and notes improvement of her headache intensity. She no longer takes maxalt for abortive therapy as the intensity has improved, now takes tylenol which is effective. Headache location: Frontal and occipital areas   Headache quality: Pounding, throbbing  Associated factors:  nausea, Photophobia, Phonophobia  Intensity: 8/10  Headache chronicity: Over 10 years  Headache frequency: 1 headache in the past 6 months   Aggravating factors : light, sounds  Relieving factors: tylenol  Prophylactic medications: Topamax, propranolol   Abortive medications: tylenol   Previously used medications: maxalt     She complains today of a pain in her right occipital groove. She notes that massaging/palpation to the area will provide her with relief. For management of her vertigo she was prescribed meclizine 12.5 mg twice daily and was referred to balance and mobility therapy at her last visit. CTA Head Neck W Contrast 3/30/2023 was unremarkable. She reports today that she has continued having vertigo which she is able to tolerate. Her dizziness can be well controlled for 1-2 months at a time and will have a sudden episode of severe vertigo lasting approximately 7-10 days.

## 2023-06-07 RX ORDER — TOPIRAMATE 100 MG/1
TABLET, FILM COATED ORAL
Qty: 180 TABLET | Refills: 0 | Status: SHIPPED | OUTPATIENT
Start: 2023-06-07

## 2023-06-07 NOTE — TELEPHONE ENCOUNTER
Previous Zoran Brown pt,    Pharmacy requesting refill of Topamax 100mg.       Medication active on med list yes      Date of last Rx: 2/9/2023 with 5 refills          verified by Buddy Nolan LPN      Date of last appointment 5/25/2023    Next Visit Date:  Visit date not found (pt set to return around 8/25/2023)

## 2023-09-05 RX ORDER — TOPIRAMATE 100 MG/1
TABLET, FILM COATED ORAL
Qty: 180 TABLET | Refills: 1 | Status: SHIPPED | OUTPATIENT
Start: 2023-09-05

## 2023-09-05 RX ORDER — BUSPIRONE HYDROCHLORIDE 5 MG/1
5 TABLET ORAL 2 TIMES DAILY
Qty: 180 TABLET | Refills: 1 | Status: SHIPPED | OUTPATIENT
Start: 2023-09-05

## 2023-09-05 NOTE — TELEPHONE ENCOUNTER
Pharmacy requesting refill of topiramate (TOPAMAX) 100 MG tablet        Medication active on med list yes      Date of last Rx: 06/07/2023 with 0 refills          verified by AYLEEN ROLLINS      Date of last appointment 05/25/2023    Next Visit Date:  Visit date not found

## 2023-09-05 NOTE — TELEPHONE ENCOUNTER
Pharmacy requesting refill of buspirone 5 mg.       Medication active on med list yes      Date of last Rx: 8/10/2022 with 11 refills          verified by AYLEEN HORTON      Date of last appointment 5/25/2023    Next Visit Date:  Visit date not found

## 2023-09-07 NOTE — TELEPHONE ENCOUNTER
Pharmacy requesting refill of Propranolol 60mg.       Medication active on med list yes      Date of last Rx: 8/10/2023 with 11 refills          verified by Dakota Lucas LPN      Date of last appointment 5/25/2023    Next Visit Date:  Visit date not found

## 2023-09-10 RX ORDER — PROPRANOLOL HCL 60 MG
60 CAPSULE, EXTENDED RELEASE 24HR ORAL DAILY
Qty: 30 CAPSULE | Refills: 11 | Status: SHIPPED | OUTPATIENT
Start: 2023-09-10

## 2023-12-21 NOTE — TELEPHONE ENCOUNTER
Received a call from the patient and from [facility] requesting prescriptions to be transferred there. New orders pended and set to correct pharmacy. Pharmacy requesting refill of Buspirone 5mg. Medication active on med list yes      Date of last Rx: 9/5/2023 with 1 refills          verified by Toshia Cannon requesting refill of Meclizine 12.5mg. Medication active on med list yes      Date of last Rx: 12/4/2021 with 0 refills          verified by Toshia Cannon requesting refill of Topamax 100mg.       Medication active on med list yes      Date of last Rx: 9/5/2023 with 1 refills          verified by Lela Milian LPN      Date of last appointment 5/25/2023    Next Visit Date:  Visit date not found

## 2023-12-26 RX ORDER — MECLIZINE HCL 12.5 MG/1
TABLET ORAL
Qty: 90 TABLET | Refills: 2 | Status: SHIPPED | OUTPATIENT
Start: 2023-12-26

## 2023-12-26 RX ORDER — TOPIRAMATE 100 MG/1
100 TABLET, FILM COATED ORAL 2 TIMES DAILY
Qty: 180 TABLET | Refills: 1 | Status: SHIPPED | OUTPATIENT
Start: 2023-12-26

## 2023-12-26 RX ORDER — BUSPIRONE HYDROCHLORIDE 5 MG/1
5 TABLET ORAL 2 TIMES DAILY
Qty: 180 TABLET | Refills: 1 | Status: SHIPPED | OUTPATIENT
Start: 2023-12-26

## 2024-01-30 ENCOUNTER — HOSPITAL ENCOUNTER (EMERGENCY)
Age: 64
Discharge: HOME | End: 2024-01-30
Attending: EMERGENCY MEDICINE
Payer: COMMERCIAL

## 2024-01-30 VITALS
HEART RATE: 65 BPM | OXYGEN SATURATION: 97 % | TEMPERATURE: 97.3 F | RESPIRATION RATE: 18 BRPM | HEIGHT: 68 IN | WEIGHT: 182 LBS | BODY MASS INDEX: 27.58 KG/M2 | DIASTOLIC BLOOD PRESSURE: 95 MMHG | SYSTOLIC BLOOD PRESSURE: 172 MMHG

## 2024-01-30 DIAGNOSIS — S39.012A STRAIN OF LUMBAR REGION, INITIAL ENCOUNTER: ICD-10-CM

## 2024-01-30 DIAGNOSIS — M62.838 SPASM OF MUSCLE: Primary | ICD-10-CM

## 2024-01-30 PROCEDURE — 99284 EMERGENCY DEPT VISIT MOD MDM: CPT

## 2024-01-30 PROCEDURE — 6360000002 HC RX W HCPCS: Performed by: NURSE PRACTITIONER

## 2024-01-30 PROCEDURE — 96372 THER/PROPH/DIAG INJ SC/IM: CPT

## 2024-01-30 PROCEDURE — 6370000000 HC RX 637 (ALT 250 FOR IP): Performed by: NURSE PRACTITIONER

## 2024-01-30 RX ORDER — METHOCARBAMOL 500 MG/1
500 TABLET, FILM COATED ORAL 3 TIMES DAILY
Qty: 15 TABLET | Refills: 0 | Status: SHIPPED | OUTPATIENT
Start: 2024-01-30 | End: 2024-02-04

## 2024-01-30 RX ORDER — LIDOCAINE 50 MG/G
1 PATCH TOPICAL DAILY
Qty: 10 PATCH | Refills: 0 | Status: SHIPPED | OUTPATIENT
Start: 2024-01-30 | End: 2024-02-09

## 2024-01-30 RX ORDER — ORPHENADRINE CITRATE 30 MG/ML
60 INJECTION INTRAMUSCULAR; INTRAVENOUS ONCE
Status: COMPLETED | OUTPATIENT
Start: 2024-01-30 | End: 2024-01-30

## 2024-01-30 RX ORDER — LIDOCAINE 4 G/G
1 PATCH TOPICAL ONCE
Status: DISCONTINUED | OUTPATIENT
Start: 2024-01-30 | End: 2024-01-30 | Stop reason: HOSPADM

## 2024-01-30 RX ADMIN — ORPHENADRINE CITRATE 60 MG: 30 INJECTION, SOLUTION INTRAMUSCULAR; INTRAVENOUS at 13:50

## 2024-01-30 ASSESSMENT — LIFESTYLE VARIABLES
HOW MANY STANDARD DRINKS CONTAINING ALCOHOL DO YOU HAVE ON A TYPICAL DAY: PATIENT DOES NOT DRINK
HOW OFTEN DO YOU HAVE A DRINK CONTAINING ALCOHOL: NEVER

## 2024-01-30 ASSESSMENT — PAIN DESCRIPTION - LOCATION
LOCATION: BACK
LOCATION: BACK

## 2024-01-30 ASSESSMENT — ENCOUNTER SYMPTOMS: BACK PAIN: 1

## 2024-01-30 ASSESSMENT — PAIN - FUNCTIONAL ASSESSMENT: PAIN_FUNCTIONAL_ASSESSMENT: 0-10

## 2024-01-30 ASSESSMENT — PAIN DESCRIPTION - ORIENTATION: ORIENTATION: LOWER

## 2024-01-30 ASSESSMENT — PAIN SCALES - GENERAL
PAINLEVEL_OUTOF10: 10
PAINLEVEL_OUTOF10: 10

## 2024-01-30 NOTE — ED PROVIDER NOTES
[facility] EMERGENCY DEPARTMENT  EMERGENCY DEPARTMENT ENCOUNTER      Pt Name: Rylie Pedraza  MRN: [id number]  Birthdate 1960  Date of evaluation: 1/30/2024  Provider: BRITTA Teixeira CNP  2:16 PM    CHIEF COMPLAINT       Chief Complaint   Patient presents with    Back Pain     Back pain since Tuesday after moving the wrong way         HISTORY OF PRESENT ILLNESS    Rylie Pedraza is a 63 y.o. female who presents to the emergency department for evaluation of back pain.  Patient states that last week on Tuesday she was watching her grandchild and was picking up after him and when she stood up, she felt a twisting and pinch in her low back.  She is been taking Tylenol since then without relief.  She denies any radiculopathy.  No midline back pain no pain into the abdomen.  She states it is worse with any movement rotation bending and trying to get up from a sitting position.  No numbness no tingling no weakness.  No trauma.  No history of previous back injuries or surgeries. She is on blood thinner so she cannot take motrin. She is not a diabetic.    HPI    Nursing Notes were reviewed.    REVIEW OF SYSTEMS       Review of Systems   Musculoskeletal:  Positive for back pain.   All other systems reviewed and are negative.      Except as noted above the remainder of the review of systems was reviewed and negative.       PAST MEDICAL HISTORY     Past Medical History:   Diagnosis Date    Atrial fibrillation (HCC)     Headache     migraine's since 2003    Hypertension     Kidney mass     Kidney stone     Vertigo          SURGICAL HISTORY       Past Surgical History:   Procedure Laterality Date    CYST REMOVAL      On top of head    TUBAL LIGATION Bilateral     VENTRICULAR ABLATION SURGERY  08/2019         CURRENT MEDICATIONS       Discharge Medication List as of 1/30/2024  2:00 PM        CONTINUE these medications which have NOT CHANGED    Details   busPIRone (BUSPAR) 5 MG tablet Take 1 tablet by 
  Take 1 tablet by mouth 3 times daily    PROPRANOLOL (INDERAL LA) 60 MG EXTENDED RELEASE CAPSULE    Take 1 capsule by mouth daily    RAMIPRIL (ALTACE) 2.5 MG CAPSULE    Take 2.5 mg by mouth daily     RIVAROXABAN (XARELTO) 20 MG TABS TABLET    Take 1 tablet by mouth Daily with supper    RIZATRIPTAN (MAXALT) 10 MG TABLET    Take 1 tablet by mouth once as needed for Migraine May repeat in 2 hours if needed    TOPIRAMATE (TOPAMAX) 100 MG TABLET    Take 1 tablet by mouth 2 times daily    VITAMIN D3 (CHOLECALCIFEROL) 10 MCG (400 UNIT) TABS TABLET    Take 1 tablet by mouth daily       ALLERGIES     is allergic to aspartame and phenylalanine and penicillins.      FINAL IMPRESSION    No diagnosis found.      DISPOSITION/PLAN   DISPOSITION        Condition on Disposition    Improved    PATIENT REFERRED TO:  No follow-up provider specified.    DISCHARGE MEDICATIONS:  New Prescriptions    No medications on file       (Please note that portions of this note were completed with a voice recognition program.  Efforts were made to edit the dictations but occasionally words are mis-transcribed.)    Jean Marie Ponce DO, DO  Attending Emergency Physician       Jean Marie Ponce DO  01/30/24 [id number]

## 2024-01-30 NOTE — DISCHARGE INSTRUCTIONS
Home.  Alternate ice and heat as we discussed.  Use lidocaine patches.  Use Robaxin for muscle spasms.  This may make you tired.  Return immediately to the emergency department if you experience loss of bowel or bladder control.  Abdominal pain, pain that radiates into the legs, pain that worsens, symptoms that are not improving, or any other concerns.  Continue to use Tylenol.  Maximum amount allowed is 4000 mg daily.  Use lidocaine patches as prescribed to help with pain

## 2024-02-13 RX ORDER — MECLIZINE HCL 12.5 MG/1
TABLET ORAL
Qty: 90 TABLET | Refills: 10 | OUTPATIENT
Start: 2024-02-13

## 2024-02-28 RX ORDER — TOPIRAMATE 100 MG/1
100 TABLET, FILM COATED ORAL 2 TIMES DAILY
Qty: 180 TABLET | Refills: 1 | Status: SHIPPED | OUTPATIENT
Start: 2024-02-28

## 2024-02-28 RX ORDER — BUSPIRONE HYDROCHLORIDE 5 MG/1
5 TABLET ORAL 2 TIMES DAILY
Qty: 180 TABLET | Refills: 1 | Status: SHIPPED | OUTPATIENT
Start: 2024-02-28

## 2024-02-28 NOTE — TELEPHONE ENCOUNTER
Pharmacy requesting refill of topiramate 100mg.      Medication active on med list yes      Date of last Rx: 12/26/2023 with 1 refills          verified by AMY BELLAMY        Pharmacy requesting refill of buspirone 5mg.      Medication active on med list yes      Date of last Rx: 12/26/2023 with 1 refills          verified by AMY BELLAMY      Date of last appointment 5/25/2023    Next Visit Date:  Visit date not found

## 2024-08-27 ENCOUNTER — TELEPHONE (OUTPATIENT)
Dept: NEUROLOGY | Age: 64
End: 2024-08-27

## 2024-08-27 NOTE — TELEPHONE ENCOUNTER
We received two faxes from [facility] for Rylie for Buspirone 5 mg. and Topiramate. I called andshahnaz asking if she wanted her RX to go to [facility] now.   I spoke with Rylie and she is using [facility] and said to send in a 90 day supply.

## 2024-08-28 RX ORDER — BUSPIRONE HYDROCHLORIDE 5 MG/1
5 TABLET ORAL 2 TIMES DAILY
Qty: 180 TABLET | Refills: 1 | Status: SHIPPED | OUTPATIENT
Start: 2024-08-28 | End: 2024-11-26

## 2024-08-28 RX ORDER — TOPIRAMATE 100 MG/1
100 TABLET, FILM COATED ORAL 2 TIMES DAILY
Qty: 180 TABLET | Refills: 1 | Status: SHIPPED | OUTPATIENT
Start: 2024-08-28

## 2025-02-25 ENCOUNTER — TRANSCRIBE ORDERS (OUTPATIENT)
Dept: ADMINISTRATIVE | Age: 65
End: 2025-02-25

## 2025-02-25 DIAGNOSIS — Z78.0 ASYMPTOMATIC MENOPAUSAL STATE: ICD-10-CM

## 2025-02-25 DIAGNOSIS — Z12.31 ENCOUNTER FOR SCREENING MAMMOGRAM FOR MALIGNANT NEOPLASM OF BREAST: ICD-10-CM

## 2025-02-25 DIAGNOSIS — N28.89 OTHER SPECIFIED DISORDERS OF KIDNEY AND URETER: Primary | ICD-10-CM

## 2025-03-03 ENCOUNTER — HOSPITAL ENCOUNTER (OUTPATIENT)
Age: 65
Discharge: HOME | End: 2025-03-03
Payer: COMMERCIAL

## 2025-03-03 LAB
25(OH)D3 SERPL-MCNC: 39.1 NG/ML (ref 30–100)
ALBUMIN SERPL-MCNC: 4.2 G/DL (ref 3.5–5.2)
ALBUMIN/GLOB SERPL: 1.4 {RATIO} (ref 1–2.5)
ALP SERPL-CCNC: 77 U/L (ref 35–104)
ALT SERPL-CCNC: 16 U/L (ref 10–35)
ANION GAP SERPL CALCULATED.3IONS-SCNC: 11 MMOL/L (ref 9–16)
AST SERPL-CCNC: 22 U/L (ref 10–35)
BILIRUB SERPL-MCNC: 0.3 MG/DL (ref 0–1.2)
BUN SERPL-MCNC: 18 MG/DL (ref 8–23)
CALCIUM SERPL-MCNC: 9.2 MG/DL (ref 8.6–10.4)
CHLORIDE SERPL-SCNC: 106 MMOL/L (ref 98–107)
CHOLEST SERPL-MCNC: 199 MG/DL (ref 0–199)
CHOLESTEROL/HDL RATIO: 5
CO2 SERPL-SCNC: 24 MMOL/L (ref 20–31)
CREAT SERPL-MCNC: 1 MG/DL (ref 0.6–0.9)
CREAT UR-MCNC: 240 MG/DL (ref 28–217)
CRP SERPL HS-MCNC: 4.1 MG/L (ref 0–5)
ERYTHROCYTE [SEDIMENTATION RATE] IN BLOOD BY PHOTOMETRIC METHOD: 4 MM/HR (ref 0–30)
EST. AVERAGE GLUCOSE BLD GHB EST-MCNC: 108 MG/DL
FOLATE SERPL-MCNC: 7.4 NG/ML (ref 4.8–24.2)
GFR, ESTIMATED: 63 ML/MIN/1.73M2
GLUCOSE SERPL-MCNC: 106 MG/DL (ref 74–99)
HBA1C MFR BLD: 5.4 % (ref 4–6)
HDLC SERPL-MCNC: 40 MG/DL
LDLC SERPL CALC-MCNC: 130 MG/DL (ref 0–100)
MAGNESIUM SERPL-MCNC: 2.1 MG/DL (ref 1.6–2.4)
MICROALBUMIN UR-MCNC: 22 MG/L (ref 0–20)
MICROALBUMIN/CREAT UR-RTO: 9 MCG/MG CREAT (ref 0–25)
POTASSIUM SERPL-SCNC: 3.8 MMOL/L (ref 3.7–5.3)
PROT SERPL-MCNC: 7.3 G/DL (ref 6.6–8.7)
SODIUM SERPL-SCNC: 141 MMOL/L (ref 136–145)
T4 FREE SERPL-MCNC: 1 NG/DL (ref 0.9–1.7)
TRIGL SERPL-MCNC: 143 MG/DL
TSH SERPL DL<=0.05 MIU/L-ACNC: 3.77 UIU/ML (ref 0.27–4.2)
VIT B12 SERPL-MCNC: 437 PG/ML (ref 232–1245)
VLDLC SERPL CALC-MCNC: 29 MG/DL (ref 1–30)

## 2025-03-03 PROCEDURE — 80061 LIPID PANEL: CPT

## 2025-03-03 PROCEDURE — 82607 VITAMIN B-12: CPT

## 2025-03-03 PROCEDURE — 86376 MICROSOMAL ANTIBODY EACH: CPT

## 2025-03-03 PROCEDURE — 86800 THYROGLOBULIN ANTIBODY: CPT

## 2025-03-03 PROCEDURE — 36415 COLL VENOUS BLD VENIPUNCTURE: CPT

## 2025-03-03 PROCEDURE — 82570 ASSAY OF URINE CREATININE: CPT

## 2025-03-03 PROCEDURE — 84481 FREE ASSAY (FT-3): CPT

## 2025-03-03 PROCEDURE — 82746 ASSAY OF FOLIC ACID SERUM: CPT

## 2025-03-03 PROCEDURE — 84443 ASSAY THYROID STIM HORMONE: CPT

## 2025-03-03 PROCEDURE — 83735 ASSAY OF MAGNESIUM: CPT

## 2025-03-03 PROCEDURE — 82306 VITAMIN D 25 HYDROXY: CPT

## 2025-03-03 PROCEDURE — 82043 UR ALBUMIN QUANTITATIVE: CPT

## 2025-03-03 PROCEDURE — 84480 ASSAY TRIIODOTHYRONINE (T3): CPT

## 2025-03-03 PROCEDURE — 80053 COMPREHEN METABOLIC PANEL: CPT

## 2025-03-03 PROCEDURE — 86140 C-REACTIVE PROTEIN: CPT

## 2025-03-03 PROCEDURE — 84439 ASSAY OF FREE THYROXINE: CPT

## 2025-03-03 PROCEDURE — 83036 HEMOGLOBIN GLYCOSYLATED A1C: CPT

## 2025-03-03 PROCEDURE — 85652 RBC SED RATE AUTOMATED: CPT

## 2025-03-04 LAB
T3 SERPL-MCNC: NORMAL NG/DL (ref 80–200)
T3FREE SERPL-MCNC: 2.43 PG/ML (ref 2–4.4)
THYROGLOBULIN AB: 33 IU/ML (ref 0–40)
THYROPEROXIDASE AB SERPL IA-ACNC: 8.6 IU/ML (ref 0–25)

## 2025-03-13 ENCOUNTER — HOSPITAL ENCOUNTER (OUTPATIENT)
Age: 65
Setting detail: OUTPATIENT SURGERY
Discharge: HOME | End: 2025-03-13
Attending: INTERNAL MEDICINE | Admitting: INTERNAL MEDICINE
Payer: COMMERCIAL

## 2025-03-13 VITALS
RESPIRATION RATE: 19 BRPM | SYSTOLIC BLOOD PRESSURE: 148 MMHG | HEART RATE: 59 BPM | HEIGHT: 68 IN | BODY MASS INDEX: 25.66 KG/M2 | OXYGEN SATURATION: 98 % | TEMPERATURE: 97.8 F | DIASTOLIC BLOOD PRESSURE: 95 MMHG | WEIGHT: 169.31 LBS

## 2025-03-13 DIAGNOSIS — R94.39 ABNORMAL STRESS TEST: ICD-10-CM

## 2025-03-13 LAB
BUN BLD-MCNC: 13 MG/DL (ref 8–26)
CA-I BLD-SCNC: 1.21 MMOL/L (ref 1.15–1.33)
CHLORIDE BLD-SCNC: 114 MMOL/L (ref 98–107)
ECHO BSA: 1.92 M2
EGFR, POC: 82 ML/MIN/1.73M2
GLUCOSE BLD-MCNC: 139 MG/DL (ref 74–100)
HCT VFR BLD AUTO: 51 % (ref 36–46)
PLATELET # BLD AUTO: 307 K/UL (ref 140–450)
POC CREATININE: 0.8 MG/DL (ref 0.51–1.19)
POC HEMOGLOBIN (CALC): 17.3 G/DL (ref 12–16)
POTASSIUM BLD-SCNC: 3.6 MMOL/L (ref 3.5–4.5)
SODIUM BLD-SCNC: 148 MMOL/L (ref 138–146)

## 2025-03-13 PROCEDURE — 6360000002 HC RX W HCPCS: Performed by: INTERNAL MEDICINE

## 2025-03-13 PROCEDURE — 82330 ASSAY OF CALCIUM: CPT

## 2025-03-13 PROCEDURE — 85014 HEMATOCRIT: CPT

## 2025-03-13 PROCEDURE — 2709999900 HC NON-CHARGEABLE SUPPLY: Performed by: INTERNAL MEDICINE

## 2025-03-13 PROCEDURE — 93454 CORONARY ARTERY ANGIO S&I: CPT | Performed by: INTERNAL MEDICINE

## 2025-03-13 PROCEDURE — 84520 ASSAY OF UREA NITROGEN: CPT

## 2025-03-13 PROCEDURE — 82947 ASSAY GLUCOSE BLOOD QUANT: CPT

## 2025-03-13 PROCEDURE — 7100000010 HC PHASE II RECOVERY - FIRST 15 MIN: Performed by: INTERNAL MEDICINE

## 2025-03-13 PROCEDURE — 6360000004 HC RX CONTRAST MEDICATION: Performed by: INTERNAL MEDICINE

## 2025-03-13 PROCEDURE — 7100000011 HC PHASE II RECOVERY - ADDTL 15 MIN: Performed by: INTERNAL MEDICINE

## 2025-03-13 PROCEDURE — 85049 AUTOMATED PLATELET COUNT: CPT

## 2025-03-13 PROCEDURE — 82565 ASSAY OF CREATININE: CPT

## 2025-03-13 PROCEDURE — 2580000003 HC RX 258: Performed by: INTERNAL MEDICINE

## 2025-03-13 PROCEDURE — 84295 ASSAY OF SERUM SODIUM: CPT

## 2025-03-13 PROCEDURE — 84132 ASSAY OF SERUM POTASSIUM: CPT

## 2025-03-13 PROCEDURE — C1894 INTRO/SHEATH, NON-LASER: HCPCS | Performed by: INTERNAL MEDICINE

## 2025-03-13 PROCEDURE — 82435 ASSAY OF BLOOD CHLORIDE: CPT

## 2025-03-13 RX ORDER — HEPARIN SODIUM 1000 [USP'U]/ML
INJECTION, SOLUTION INTRAVENOUS; SUBCUTANEOUS PRN
Status: DISCONTINUED | OUTPATIENT
Start: 2025-03-13 | End: 2025-03-13 | Stop reason: HOSPADM

## 2025-03-13 RX ORDER — SODIUM CHLORIDE 0.9 % (FLUSH) 0.9 %
5-40 SYRINGE (ML) INJECTION PRN
Status: DISCONTINUED | OUTPATIENT
Start: 2025-03-13 | End: 2025-03-19 | Stop reason: HOSPADM

## 2025-03-13 RX ORDER — SODIUM CHLORIDE 9 MG/ML
INJECTION, SOLUTION INTRAVENOUS PRN
Status: DISCONTINUED | OUTPATIENT
Start: 2025-03-13 | End: 2025-03-19 | Stop reason: HOSPADM

## 2025-03-13 RX ORDER — ACETAMINOPHEN 325 MG/1
650 TABLET ORAL EVERY 4 HOURS PRN
Status: DISCONTINUED | OUTPATIENT
Start: 2025-03-13 | End: 2025-03-19 | Stop reason: HOSPADM

## 2025-03-13 RX ORDER — SODIUM CHLORIDE 9 MG/ML
INJECTION, SOLUTION INTRAVENOUS CONTINUOUS
Status: DISCONTINUED | OUTPATIENT
Start: 2025-03-13 | End: 2025-03-19 | Stop reason: HOSPADM

## 2025-03-13 RX ORDER — SODIUM CHLORIDE 0.9 % (FLUSH) 0.9 %
5-40 SYRINGE (ML) INJECTION EVERY 12 HOURS SCHEDULED
Status: DISCONTINUED | OUTPATIENT
Start: 2025-03-13 | End: 2025-03-19 | Stop reason: HOSPADM

## 2025-03-13 RX ORDER — SODIUM CHLORIDE 9 MG/ML
INJECTION, SOLUTION INTRAVENOUS CONTINUOUS
Status: DISCONTINUED | OUTPATIENT
Start: 2025-03-13 | End: 2025-03-16 | Stop reason: HOSPADM

## 2025-03-13 RX ORDER — LIDOCAINE HYDROCHLORIDE 10 MG/ML
INJECTION, SOLUTION INFILTRATION; PERINEURAL PRN
Status: DISCONTINUED | OUTPATIENT
Start: 2025-03-13 | End: 2025-03-13 | Stop reason: HOSPADM

## 2025-03-13 RX ORDER — BUSPIRONE HYDROCHLORIDE 5 MG/1
5 TABLET ORAL 2 TIMES DAILY
COMMUNITY

## 2025-03-13 RX ORDER — MIDAZOLAM 1 MG/ML
INJECTION INTRAMUSCULAR; INTRAVENOUS PRN
Status: DISCONTINUED | OUTPATIENT
Start: 2025-03-13 | End: 2025-03-13 | Stop reason: HOSPADM

## 2025-03-13 RX ORDER — IOPAMIDOL 755 MG/ML
INJECTION, SOLUTION INTRAVASCULAR PRN
Status: DISCONTINUED | OUTPATIENT
Start: 2025-03-13 | End: 2025-03-13 | Stop reason: HOSPADM

## 2025-03-13 RX ADMIN — SODIUM CHLORIDE: 0.9 INJECTION, SOLUTION INTRAVENOUS at 12:48

## 2025-03-13 NOTE — DISCHARGE INSTRUCTIONS
your doctor).  \" If you feel nauseated, continue with liquids until the nausea is gone.  \" Notify your physician if you have not urinated within 8 hours after the procedure.  \" Resume your medications unless otherwise instructed.

## 2025-03-13 NOTE — H&P
[facility]   Admission Note                 Date:   3/13/2025  Patient name: Rylie Pedraza  Date of admission:  3/13/2025 12:30 PM  MRN:   [id number]  YOB: 1960    Reason for Admission: Positive stress test for ischemia    CHIEF COMPLAINT: Chest pain and shortness of breath    History Obtained From:  patient    HISTORY OF PRESENT ILLNESS:      The patient is a 64 y.o.  female who is admitted to the hospital for positive stress test  Exertional chest pain and shortness of breath.  Stress test was positive for ischemia.Referred for coronary angiography and possible PCI.    Past Medical History:   has a past medical history of Atrial fibrillation (HCC), Headache, Hypertension, Kidney mass, Kidney stone, and Vertigo.    Past Surgical History:   has a past surgical history that includes Tubal ligation (Bilateral); cyst removal; and Ventricular ablation surgery (08/2019).     Home Medications:    Prior to Admission medications    Medication Sig Start Date End Date Taking? Authorizing Provider   busPIRone (BUSPAR) 5 MG tablet Take 1 tablet by mouth 2 times daily   Yes ProviderDarrell MD   dilTIAZem (CARDIZEM) 30 MG tablet Take 1 tablet by mouth in the morning and at bedtime 2/4/25  Yes Kofi Vu DO   topiramate (TOPAMAX) 100 MG tablet Take 1 tablet by mouth 2 times daily 8/28/24  Yes Richardson Tello MD   propranolol (INDERAL LA) 60 MG extended release capsule Take 1 capsule by mouth daily 9/10/23  Yes Breanna Noel MD   DULoxetine (CYMBALTA) 60 MG extended release capsule Take by mouth daily 3/9/23  Yes ProviderDarrell MD   vitamin D3 (CHOLECALCIFEROL) 10 MCG (400 UNIT) TABS tablet Take 1 tablet by mouth daily   Yes ProviderDarrell MD   potassium chloride (KLOR-CON M) 20 MEQ extended release tablet Take 1 tablet by mouth 3 times daily  Patient taking differently: Take 1 tablet by mouth 2 times daily 7/8/19  Yes Becca Phillips APRN - CNS   levothyroxine

## 2025-03-13 NOTE — PROGRESS NOTES
Patient admitted and questions answered. Patient ready for procedure. Call light to reach with side rails up 2 of 2. Groin clipped with writer and A Strassner present.  Dustin  at bedside with patient.  History and physical to be completed and updated.

## 2025-04-16 ENCOUNTER — OFFICE VISIT (OUTPATIENT)
Dept: NEUROLOGY | Age: 65
End: 2025-04-16
Payer: COMMERCIAL

## 2025-04-16 VITALS
DIASTOLIC BLOOD PRESSURE: 69 MMHG | BODY MASS INDEX: 25.01 KG/M2 | HEART RATE: 67 BPM | SYSTOLIC BLOOD PRESSURE: 110 MMHG | HEIGHT: 68 IN | WEIGHT: 165 LBS

## 2025-04-16 DIAGNOSIS — G40.909 SEIZURE DISORDER (HCC): Primary | ICD-10-CM

## 2025-04-16 PROCEDURE — 3078F DIAST BP <80 MM HG: CPT | Performed by: PSYCHIATRY & NEUROLOGY

## 2025-04-16 PROCEDURE — 1160F RVW MEDS BY RX/DR IN RCRD: CPT | Performed by: PSYCHIATRY & NEUROLOGY

## 2025-04-16 PROCEDURE — 99214 OFFICE O/P EST MOD 30 MIN: CPT | Performed by: PSYCHIATRY & NEUROLOGY

## 2025-04-16 PROCEDURE — 1123F ACP DISCUSS/DSCN MKR DOCD: CPT | Performed by: PSYCHIATRY & NEUROLOGY

## 2025-04-16 PROCEDURE — G8399 PT W/DXA RESULTS DOCUMENT: HCPCS | Performed by: PSYCHIATRY & NEUROLOGY

## 2025-04-16 PROCEDURE — 1036F TOBACCO NON-USER: CPT | Performed by: PSYCHIATRY & NEUROLOGY

## 2025-04-16 PROCEDURE — G8419 CALC BMI OUT NRM PARAM NOF/U: HCPCS | Performed by: PSYCHIATRY & NEUROLOGY

## 2025-04-16 PROCEDURE — 3074F SYST BP LT 130 MM HG: CPT | Performed by: PSYCHIATRY & NEUROLOGY

## 2025-04-16 PROCEDURE — 3017F COLORECTAL CA SCREEN DOC REV: CPT | Performed by: PSYCHIATRY & NEUROLOGY

## 2025-04-16 PROCEDURE — 1090F PRES/ABSN URINE INCON ASSESS: CPT | Performed by: PSYCHIATRY & NEUROLOGY

## 2025-04-16 PROCEDURE — 1159F MED LIST DOCD IN RCRD: CPT | Performed by: PSYCHIATRY & NEUROLOGY

## 2025-04-16 PROCEDURE — G8427 DOCREV CUR MEDS BY ELIG CLIN: HCPCS | Performed by: PSYCHIATRY & NEUROLOGY

## 2025-04-16 RX ORDER — ACETAMINOPHEN 160 MG
TABLET,DISINTEGRATING ORAL DAILY
COMMUNITY
Start: 2025-03-25

## 2025-04-16 NOTE — PROGRESS NOTES
[facility] [place]  [address], [address]  [place] [address]  Ph: [phone] or [phone]  FAX: [phone]      Reason for consult: Dizziness  I had the pleasure of seeing your patient in neurology consultation for her symptoms. As you would recall, Rylie Pedraza is a 65-year-old female who returned for continued evaluation of episodic dizziness, palpitations, and a complex constellation of neurologic symptoms including longstanding migraine disorder, essential tremor, and intermittent episodes of sudden loss of muscle tone. I reviewed in detail her previous evaluations and longitudinal care, which reflect a persistent, multifactorial symptom complex that has included cardiogenic, vestibular, and potentially neuropsychiatric contributors.  She was previously seen in neurology on 5/25/2023, at which time she reported significant clinical improvement in the intensity of her migraine headaches with the use of topiramate 200 mg daily and propranolol LA 80 mg daily. Abortive therapy had transitioned from rizatriptan to acetaminophen, which provided sufficient symptomatic relief. At that time, she described her headaches as frontal and occipital in location with pounding and throbbing quality, associated with nausea, photophobia, and phonophobia. Notably, she had experienced only one significant headache in the preceding six months by May 2023.  She also described persistent vertigo with exacerbation on positional changes, especially with head turning or rolling in bed, suggestive of a peripheral etiology such as BPPV. Despite a normal CTA head and neck with contrast (3/30/2023), and a previous normal MRI brain with attention to the internal auditory canals (1/23/2020), her vestibular complaints remained bothersome. Notably, a VNG conducted on 11/27/2019 was abnormal, revealing vertical nystagmus on positional testing without classical findings of BPPV, raising concern for central

## 2025-04-17 ENCOUNTER — HOSPITAL ENCOUNTER (OUTPATIENT)
Dept: MAMMOGRAPHY | Age: 65
Discharge: HOME | End: 2025-04-19
Attending: FAMILY MEDICINE
Payer: COMMERCIAL

## 2025-04-17 ENCOUNTER — HOSPITAL ENCOUNTER (OUTPATIENT)
Dept: CT IMAGING | Age: 65
Discharge: HOME | End: 2025-04-19
Attending: FAMILY MEDICINE
Payer: COMMERCIAL

## 2025-04-17 VITALS — WEIGHT: 165 LBS | BODY MASS INDEX: 25.01 KG/M2 | HEIGHT: 68 IN

## 2025-04-17 DIAGNOSIS — Z12.31 ENCOUNTER FOR SCREENING MAMMOGRAM FOR MALIGNANT NEOPLASM OF BREAST: ICD-10-CM

## 2025-04-17 DIAGNOSIS — N28.89 OTHER SPECIFIED DISORDERS OF KIDNEY AND URETER: ICD-10-CM

## 2025-04-17 DIAGNOSIS — Z78.0 ASYMPTOMATIC MENOPAUSAL STATE: ICD-10-CM

## 2025-04-17 PROCEDURE — 2580000003 HC RX 258: Performed by: FAMILY MEDICINE

## 2025-04-17 PROCEDURE — 74178 CT ABD&PLV WO CNTR FLWD CNTR: CPT

## 2025-04-17 PROCEDURE — 6360000004 HC RX CONTRAST MEDICATION: Performed by: FAMILY MEDICINE

## 2025-04-17 PROCEDURE — 77080 DXA BONE DENSITY AXIAL: CPT

## 2025-04-17 PROCEDURE — 2500000003 HC RX 250 WO HCPCS: Performed by: FAMILY MEDICINE

## 2025-04-17 PROCEDURE — 77063 BREAST TOMOSYNTHESIS BI: CPT

## 2025-04-17 RX ORDER — IOPAMIDOL 755 MG/ML
100 INJECTION, SOLUTION INTRAVASCULAR
Status: COMPLETED | OUTPATIENT
Start: 2025-04-17 | End: 2025-04-17

## 2025-04-17 RX ORDER — 0.9 % SODIUM CHLORIDE 0.9 %
80 INTRAVENOUS SOLUTION INTRAVENOUS ONCE
Status: COMPLETED | OUTPATIENT
Start: 2025-04-17 | End: 2025-04-17

## 2025-04-17 RX ORDER — SODIUM CHLORIDE 0.9 % (FLUSH) 0.9 %
10 SYRINGE (ML) INJECTION PRN
Status: DISCONTINUED | OUTPATIENT
Start: 2025-04-17 | End: 2025-04-20 | Stop reason: HOSPADM

## 2025-04-17 RX ADMIN — SODIUM CHLORIDE, PRESERVATIVE FREE 10 ML: 5 INJECTION INTRAVENOUS at 09:33

## 2025-04-17 RX ADMIN — IOPAMIDOL 100 ML: 755 INJECTION, SOLUTION INTRAVENOUS at 09:33

## 2025-04-17 RX ADMIN — SODIUM CHLORIDE 80 ML: 9 INJECTION, SOLUTION INTRAVENOUS at 09:34

## 2025-04-27 ENCOUNTER — TRANSCRIBE ORDERS (OUTPATIENT)
Dept: ADMINISTRATIVE | Age: 65
End: 2025-04-27

## 2025-04-27 DIAGNOSIS — R93.89 ABNORMAL FINDINGS ON DIAGNOSTIC IMAGING OF OTHER SPECIFIED BODY STRUCTURES: ICD-10-CM

## 2025-04-27 DIAGNOSIS — R93.89 ABNORMAL FINDINGS ON DIAGNOSTIC IMAGING OF OTHER SPECIFIED BODY STRUCTURES: Primary | ICD-10-CM

## 2025-04-27 DIAGNOSIS — N28.89 OTHER SPECIFIED DISORDERS OF KIDNEY AND URETER: Primary | ICD-10-CM

## 2025-04-27 DIAGNOSIS — N28.89 OTHER SPECIFIED DISORDERS OF KIDNEY AND URETER: ICD-10-CM

## 2025-05-16 ENCOUNTER — HOSPITAL ENCOUNTER (OUTPATIENT)
Dept: ULTRASOUND IMAGING | Age: 65
Discharge: HOME | End: 2025-05-18
Attending: FAMILY MEDICINE
Payer: COMMERCIAL

## 2025-05-16 ENCOUNTER — HOSPITAL ENCOUNTER (OUTPATIENT)
Dept: MRI IMAGING | Age: 65
Discharge: HOME | End: 2025-05-18
Attending: FAMILY MEDICINE
Payer: COMMERCIAL

## 2025-05-16 DIAGNOSIS — N28.89 OTHER SPECIFIED DISORDERS OF KIDNEY AND URETER: ICD-10-CM

## 2025-05-16 DIAGNOSIS — R93.89 ABNORMAL FINDINGS ON DIAGNOSTIC IMAGING OF OTHER SPECIFIED BODY STRUCTURES: ICD-10-CM

## 2025-05-16 LAB
CREAT SERPL-MCNC: 1 MG/DL (ref 0.6–0.9)
GFR, ESTIMATED: 62 ML/MIN/1.73M2

## 2025-05-16 PROCEDURE — 72197 MRI PELVIS W/O & W/DYE: CPT

## 2025-05-16 PROCEDURE — 2580000003 HC RX 258: Performed by: FAMILY MEDICINE

## 2025-05-16 PROCEDURE — 36415 COLL VENOUS BLD VENIPUNCTURE: CPT

## 2025-05-16 PROCEDURE — 6360000004 HC RX CONTRAST MEDICATION: Performed by: FAMILY MEDICINE

## 2025-05-16 PROCEDURE — 76856 US EXAM PELVIC COMPLETE: CPT

## 2025-05-16 PROCEDURE — 2500000003 HC RX 250 WO HCPCS: Performed by: FAMILY MEDICINE

## 2025-05-16 PROCEDURE — 82565 ASSAY OF CREATININE: CPT

## 2025-05-16 PROCEDURE — A9579 GAD-BASE MR CONTRAST NOS,1ML: HCPCS | Performed by: FAMILY MEDICINE

## 2025-05-16 RX ORDER — 0.9 % SODIUM CHLORIDE 0.9 %
40 INTRAVENOUS SOLUTION INTRAVENOUS ONCE
Status: COMPLETED | OUTPATIENT
Start: 2025-05-16 | End: 2025-05-16

## 2025-05-16 RX ORDER — SODIUM CHLORIDE 0.9 % (FLUSH) 0.9 %
10 SYRINGE (ML) INJECTION ONCE
Status: COMPLETED | OUTPATIENT
Start: 2025-05-16 | End: 2025-05-16

## 2025-05-16 RX ADMIN — GADOTERIDOL 15 ML: 279.3 INJECTION, SOLUTION INTRAVENOUS at 09:25

## 2025-05-16 RX ADMIN — SODIUM CHLORIDE, PRESERVATIVE FREE 10 ML: 5 INJECTION INTRAVENOUS at 09:25

## 2025-05-16 RX ADMIN — SODIUM CHLORIDE 40 ML: 9 INJECTION, SOLUTION INTRAVENOUS at 09:25

## 2025-05-21 ENCOUNTER — HOSPITAL ENCOUNTER (OUTPATIENT)
Dept: MRI IMAGING | Age: 65
Discharge: HOME | End: 2025-05-23
Attending: FAMILY MEDICINE
Payer: COMMERCIAL

## 2025-05-21 DIAGNOSIS — N28.89 OTHER SPECIFIED DISORDERS OF KIDNEY AND URETER: ICD-10-CM

## 2025-05-21 DIAGNOSIS — R93.89 ABNORMAL FINDINGS ON DIAGNOSTIC IMAGING OF OTHER SPECIFIED BODY STRUCTURES: ICD-10-CM

## 2025-05-21 PROCEDURE — 2580000003 HC RX 258: Performed by: FAMILY MEDICINE

## 2025-05-21 PROCEDURE — A9579 GAD-BASE MR CONTRAST NOS,1ML: HCPCS | Performed by: FAMILY MEDICINE

## 2025-05-21 PROCEDURE — 6360000004 HC RX CONTRAST MEDICATION: Performed by: FAMILY MEDICINE

## 2025-05-21 PROCEDURE — 74183 MRI ABD W/O CNTR FLWD CNTR: CPT

## 2025-05-21 PROCEDURE — 2500000003 HC RX 250 WO HCPCS: Performed by: FAMILY MEDICINE

## 2025-05-21 RX ORDER — 0.9 % SODIUM CHLORIDE 0.9 %
50 INTRAVENOUS SOLUTION INTRAVENOUS ONCE
Status: COMPLETED | OUTPATIENT
Start: 2025-05-21 | End: 2025-05-21

## 2025-05-21 RX ORDER — SODIUM CHLORIDE 0.9 % (FLUSH) 0.9 %
10 SYRINGE (ML) INJECTION PRN
Status: DISCONTINUED | OUTPATIENT
Start: 2025-05-21 | End: 2025-05-24 | Stop reason: HOSPADM

## 2025-05-21 RX ADMIN — GADOTERIDOL 15 ML: 279.3 INJECTION, SOLUTION INTRAVENOUS at 08:35

## 2025-05-21 RX ADMIN — SODIUM CHLORIDE 50 ML: 9 INJECTION, SOLUTION INTRAVENOUS at 08:34

## 2025-05-21 RX ADMIN — SODIUM CHLORIDE, PRESERVATIVE FREE 10 ML: 5 INJECTION INTRAVENOUS at 08:35

## (undated) DEVICE — CATHETER ANGIO 5FR L100CM GRY S STL NYL JL3.5 3 SEG BRAID L

## (undated) DEVICE — STRAP ARMBRD W1.5XL32IN FOAM STR YET SFT W/ HK AND LOOP

## (undated) DEVICE — BAND COMPR L24CM REG CLR PLAS HEMSTAT EXT HK AND LOOP RETEN

## (undated) DEVICE — CATHETER ANGIO 5FR L100CM GRY S STL NYL JR4 3 SEG BRAID L

## (undated) DEVICE — TRAY SURG CUST CRD CATH TOLEDO

## (undated) DEVICE — GLIDESHEATH SLENDER STAINLESS STEEL KIT: Brand: GLIDESHEATH SLENDER